# Patient Record
Sex: FEMALE | Race: WHITE | Employment: PART TIME | URBAN - METROPOLITAN AREA
[De-identification: names, ages, dates, MRNs, and addresses within clinical notes are randomized per-mention and may not be internally consistent; named-entity substitution may affect disease eponyms.]

---

## 2020-08-13 LAB
ANTIBODY SCREEN, EXTERNAL: NEGATIVE
CHLAMYDIA, EXTERNAL: NEGATIVE
HBSAG, EXTERNAL: NEGATIVE
HCT, EXTERNAL: 42.2
HGB, EXTERNAL: 14.2
HIV, EXTERNAL: NON REACTIVE
N. GONORRHEA, EXTERNAL: NEGATIVE
RUBELLA, EXTERNAL: NORMAL
T. PALLIDUM, EXTERNAL: NON REACTIVE
TYPE, ABO & RH, EXTERNAL: NORMAL

## 2020-09-10 LAB — GTT, 1 HR, GLUCOLA, EXTERNAL: 198

## 2020-09-15 LAB
GTT 120 MIN, EXTERNAL: 204
GTT 180 MIN, EXTERNAL: 118
GTT 60 MIN, EXTERNAL: 253
GTT, FASTING, EXTERNAL: 130

## 2021-02-19 LAB — GRBS, EXTERNAL: POSITIVE

## 2021-02-22 ENCOUNTER — HOSPITAL ENCOUNTER (OUTPATIENT)
Age: 36
Setting detail: OBSERVATION
Discharge: HOME | DRG: 540 | End: 2021-02-23
Attending: OBSTETRICS & GYNECOLOGY | Admitting: OBSTETRICS & GYNECOLOGY
Payer: MEDICAID

## 2021-02-22 PROBLEM — O16.3 HYPERTENSION AFFECTING PREGNANCY, THIRD TRIMESTER: Status: ACTIVE | Noted: 2021-02-22

## 2021-02-22 PROBLEM — F32.A DEPRESSION: Status: ACTIVE | Noted: 2021-02-22

## 2021-02-22 PROBLEM — Z34.90 PREGNANCY: Status: ACTIVE | Noted: 2021-02-22

## 2021-02-22 LAB
ALBUMIN SERPL-MCNC: 2.5 G/DL (ref 3.5–5)
ALBUMIN/GLOB SERPL: 0.7 {RATIO} (ref 1.1–2.2)
ALP SERPL-CCNC: 122 U/L (ref 45–117)
ALT SERPL-CCNC: 11 U/L (ref 12–78)
ANION GAP SERPL CALC-SCNC: 7 MMOL/L (ref 5–15)
AST SERPL-CCNC: 11 U/L (ref 15–37)
BASOPHILS # BLD: 0.1 K/UL (ref 0–0.1)
BASOPHILS NFR BLD: 0 % (ref 0–1)
BILIRUB SERPL-MCNC: 0.2 MG/DL (ref 0.2–1)
BUN SERPL-MCNC: 5 MG/DL (ref 6–20)
BUN/CREAT SERPL: 10 (ref 12–20)
CALCIUM SERPL-MCNC: 8.5 MG/DL (ref 8.5–10.1)
CHLORIDE SERPL-SCNC: 112 MMOL/L (ref 97–108)
CO2 SERPL-SCNC: 21 MMOL/L (ref 21–32)
COVID-19 RAPID TEST, COVR: NOT DETECTED
CREAT SERPL-MCNC: 0.51 MG/DL (ref 0.55–1.02)
CREAT UR-MCNC: 145 MG/DL
DIFFERENTIAL METHOD BLD: ABNORMAL
EOSINOPHIL # BLD: 0.1 K/UL (ref 0–0.4)
EOSINOPHIL NFR BLD: 1 % (ref 0–7)
ERYTHROCYTE [DISTWIDTH] IN BLOOD BY AUTOMATED COUNT: 14.2 % (ref 11.5–14.5)
GLOBULIN SER CALC-MCNC: 3.6 G/DL (ref 2–4)
GLUCOSE BLD STRIP.AUTO-MCNC: 128 MG/DL (ref 65–100)
GLUCOSE BLD STRIP.AUTO-MCNC: 150 MG/DL (ref 65–100)
GLUCOSE SERPL-MCNC: 94 MG/DL (ref 65–100)
HCT VFR BLD AUTO: 36.3 % (ref 35–47)
HGB BLD-MCNC: 12.1 G/DL (ref 11.5–16)
IMM GRANULOCYTES # BLD AUTO: 0.2 K/UL (ref 0–0.04)
IMM GRANULOCYTES NFR BLD AUTO: 1 % (ref 0–0.5)
LDH SERPL L TO P-CCNC: 105 U/L (ref 81–246)
LYMPHOCYTES # BLD: 2.3 K/UL (ref 0.8–3.5)
LYMPHOCYTES NFR BLD: 15 % (ref 12–49)
MCH RBC QN AUTO: 29.4 PG (ref 26–34)
MCHC RBC AUTO-ENTMCNC: 33.3 G/DL (ref 30–36.5)
MCV RBC AUTO: 88.1 FL (ref 80–99)
MONOCYTES # BLD: 0.8 K/UL (ref 0–1)
MONOCYTES NFR BLD: 5 % (ref 5–13)
NEUTS SEG # BLD: 12.5 K/UL (ref 1.8–8)
NEUTS SEG NFR BLD: 78 % (ref 32–75)
NRBC # BLD: 0 K/UL (ref 0–0.01)
NRBC BLD-RTO: 0 PER 100 WBC
PLATELET # BLD AUTO: 399 K/UL (ref 150–400)
PMV BLD AUTO: 9.4 FL (ref 8.9–12.9)
POTASSIUM SERPL-SCNC: 4.1 MMOL/L (ref 3.5–5.1)
PROT SERPL-MCNC: 6.1 G/DL (ref 6.4–8.2)
PROT UR-MCNC: 35 MG/DL (ref 0–11.9)
PROT/CREAT UR-RTO: 0.2
RBC # BLD AUTO: 4.12 M/UL (ref 3.8–5.2)
SARS-COV-2, COV2: NORMAL
SERVICE CMNT-IMP: ABNORMAL
SERVICE CMNT-IMP: ABNORMAL
SODIUM SERPL-SCNC: 140 MMOL/L (ref 136–145)
SOURCE, COVRS: NORMAL
URATE SERPL-MCNC: 4 MG/DL (ref 2.6–6)
WBC # BLD AUTO: 15.9 K/UL (ref 3.6–11)

## 2021-02-22 PROCEDURE — 82962 GLUCOSE BLOOD TEST: CPT

## 2021-02-22 PROCEDURE — 74011250636 HC RX REV CODE- 250/636: Performed by: OBSTETRICS & GYNECOLOGY

## 2021-02-22 PROCEDURE — 74011250637 HC RX REV CODE- 250/637: Performed by: OBSTETRICS & GYNECOLOGY

## 2021-02-22 PROCEDURE — 83615 LACTATE (LD) (LDH) ENZYME: CPT

## 2021-02-22 PROCEDURE — 59025 FETAL NON-STRESS TEST: CPT

## 2021-02-22 PROCEDURE — 36415 COLL VENOUS BLD VENIPUNCTURE: CPT

## 2021-02-22 PROCEDURE — 85025 COMPLETE CBC W/AUTO DIFF WBC: CPT

## 2021-02-22 PROCEDURE — 84550 ASSAY OF BLOOD/URIC ACID: CPT

## 2021-02-22 PROCEDURE — 80053 COMPREHEN METABOLIC PANEL: CPT

## 2021-02-22 PROCEDURE — 96374 THER/PROPH/DIAG INJ IV PUSH: CPT

## 2021-02-22 PROCEDURE — 99218 HC RM OBSERVATION: CPT

## 2021-02-22 PROCEDURE — 74011636637 HC RX REV CODE- 636/637: Performed by: OBSTETRICS & GYNECOLOGY

## 2021-02-22 PROCEDURE — 84156 ASSAY OF PROTEIN URINE: CPT

## 2021-02-22 PROCEDURE — 87635 SARS-COV-2 COVID-19 AMP PRB: CPT

## 2021-02-22 RX ORDER — ASPIRIN 81 MG/1
81 TABLET ORAL DAILY
COMMUNITY
End: 2021-03-01

## 2021-02-22 RX ORDER — CITALOPRAM 20 MG/1
10 TABLET, FILM COATED ORAL
Status: DISCONTINUED | OUTPATIENT
Start: 2021-02-22 | End: 2021-02-23 | Stop reason: HOSPADM

## 2021-02-22 RX ORDER — SODIUM CHLORIDE 0.9 % (FLUSH) 0.9 %
5-40 SYRINGE (ML) INJECTION EVERY 8 HOURS
Status: DISCONTINUED | OUTPATIENT
Start: 2021-02-22 | End: 2021-02-23 | Stop reason: HOSPADM

## 2021-02-22 RX ORDER — SWAB
1 SWAB, NON-MEDICATED MISCELLANEOUS DAILY
Status: DISCONTINUED | OUTPATIENT
Start: 2021-02-23 | End: 2021-02-23 | Stop reason: HOSPADM

## 2021-02-22 RX ORDER — DIPHENHYDRAMINE HCL 25 MG
25 CAPSULE ORAL
Status: DISCONTINUED | OUTPATIENT
Start: 2021-02-22 | End: 2021-02-23 | Stop reason: HOSPADM

## 2021-02-22 RX ORDER — ACETAMINOPHEN 325 MG/1
650 TABLET ORAL
Status: DISCONTINUED | OUTPATIENT
Start: 2021-02-22 | End: 2021-02-23 | Stop reason: HOSPADM

## 2021-02-22 RX ORDER — CALCIUM CARBONATE 200(500)MG
1 TABLET,CHEWABLE ORAL
COMMUNITY
End: 2021-03-01

## 2021-02-22 RX ORDER — ZOLPIDEM TARTRATE 5 MG/1
10 TABLET ORAL
Status: DISCONTINUED | OUTPATIENT
Start: 2021-02-22 | End: 2021-02-23 | Stop reason: HOSPADM

## 2021-02-22 RX ORDER — CITALOPRAM 10 MG/1
10 TABLET ORAL DAILY
COMMUNITY
End: 2021-03-12

## 2021-02-22 RX ORDER — SODIUM CHLORIDE 0.9 % (FLUSH) 0.9 %
5-40 SYRINGE (ML) INJECTION AS NEEDED
Status: DISCONTINUED | OUTPATIENT
Start: 2021-02-22 | End: 2021-02-23 | Stop reason: HOSPADM

## 2021-02-22 RX ORDER — ONDANSETRON 2 MG/ML
4 INJECTION INTRAMUSCULAR; INTRAVENOUS
Status: DISCONTINUED | OUTPATIENT
Start: 2021-02-22 | End: 2021-02-23 | Stop reason: HOSPADM

## 2021-02-22 RX ORDER — DOCUSATE SODIUM 100 MG/1
100 CAPSULE, LIQUID FILLED ORAL
Status: DISCONTINUED | OUTPATIENT
Start: 2021-02-22 | End: 2021-02-23 | Stop reason: HOSPADM

## 2021-02-22 RX ORDER — FAMOTIDINE 20 MG/1
20 TABLET, FILM COATED ORAL
COMMUNITY
End: 2021-03-01

## 2021-02-22 RX ADMIN — CITALOPRAM HYDROBROMIDE 10 MG: 20 TABLET ORAL at 21:10

## 2021-02-22 RX ADMIN — LABETALOL HYDROCHLORIDE 300 MG: 100 TABLET, FILM COATED ORAL at 18:29

## 2021-02-22 RX ADMIN — INSULIN HUMAN 30 UNITS: 100 INJECTION, SUSPENSION SUBCUTANEOUS at 21:10

## 2021-02-22 RX ADMIN — ONDANSETRON 4 MG: 2 INJECTION INTRAMUSCULAR; INTRAVENOUS at 17:39

## 2021-02-22 NOTE — PROGRESS NOTES
Ante Partum Progress Note    Nga Began  Unknown    Assessment: Unknown   Active Problems:    Pregnancy (2021)      Gestational diabetes mellitus (GDM) in childbirth, insulin controlled (2021)      Depression (2021)      Hypertension affecting pregnancy, third trimester (2021)    --w / intermittent severe range pressures. --no indication for induction at this time, w/ normal PIH labs, but recommend cont. In hspt.  Observation w/ q 4 hour bp checks       Start Labetalol 300mg bid now due to [address]:  Admit for continued observtion            Induce for persistent severe range BPs            If intermittent severe range BP will induce at 37wks    Patient states she has no new complaints    Orders/Charges: High and Non Stress Test        Vitals:  Visit Vitals  BP (!) 146/77   Pulse 75   Temp 98.6 °F (37 °C)   Resp 16   Ht 5' 7\" (1.702 m)   Wt 105.2 kg (232 lb)   SpO2 98%   BMI 36.34 kg/m²     Temp (24hrs), Av.7 °F (37.1 °C), Min:98.6 °F (37 °C), Max:98.7 °F (37.1 °C)    Patient Vitals for the past 24 hrs:   BP Temp Pulse Resp SpO2 Height Weight   21 1415 -- -- -- -- 98 % -- --   21 1410 -- -- -- -- 98 % -- --   21 1405 -- -- -- -- 97 % -- --   21 1400 (!) 146/77 98.6 °F (37 °C) 75 16 97 % -- --   21 1355 -- -- -- -- 97 % -- --   21 1350 -- -- -- -- 97 % -- --   21 1345 (!) 140/83 -- 78 -- 98 % -- --   21 1340 -- -- -- -- 98 % -- --   21 1335 -- -- -- -- 98 % -- --   21 1330 (!) 146/84 -- 71 -- 98 % -- --   21 1325 -- -- -- -- 98 % -- --   21 1320 -- -- -- -- 98 % -- --   21 1315 (!) 141/80 -- 82 -- 98 % -- --   21 1301 (!) 163/82 -- 80 -- -- -- --   21 1300 -- -- -- -- -- 5' 7\" (1.702 m) 105.2 kg (232 lb)   21 1259 -- -- -- -- 98 % -- --   21 1254 -- -- -- -- 98 % -- --   21 1250 -- 98.7 °F (37.1 °C) -- 18 100 % -- --   21 1249 -- -- -- -- 98 % -- -- 02/22/21 1246 (!) 163/100 -- 72 -- -- -- --   02/22/21 1244 -- -- -- -- 98 % -- --   02/22/21 1239 -- -- -- -- 98 % -- --   02/22/21 1231 (!) 160/95 -- 85 -- -- -- --       Last 24hr Input/Output:  No intake or output data in the 24 hours ending 02/22/21 1511     Non stress test:  Reactive    Patient Vitals for the past 4 hrs: Mode Fetal Heart Rate Fetal Activity Variability Decelerations Accelerations RN Reviewed Strip? Non Stress Test   02/22/21 1315 External 140 Present 6-25 BPM None Yes Yes Reactive   02/22/21 1250 External 140 Present 6-25 BPM None Yes Yes --       Uterine Activity: None     Exam:  Patient without distress. Abdomen, fundus soft non-tender     Extremities, no redness or tenderness     No current facility-administered medications for this encounter. Additional Exam: Deferred    Labs:     Recent Results (from the past 24 hour(s))   CBC WITH AUTOMATED DIFF    Collection Time: 02/22/21 12:41 PM   Result Value Ref Range    WBC 15.9 (H) 3.6 - 11.0 K/uL    RBC 4.12 3.80 - 5.20 M/uL    HGB 12.1 11.5 - 16.0 g/dL    HCT 36.3 35.0 - 47.0 %    MCV 88.1 80.0 - 99.0 FL    MCH 29.4 26.0 - 34.0 PG    MCHC 33.3 30.0 - 36.5 g/dL    RDW 14.2 11.5 - 14.5 %    PLATELET 032 082 - 068 K/uL    MPV 9.4 8.9 - 12.9 FL    NRBC 0.0 0  WBC    ABSOLUTE NRBC 0.00 0.00 - 0.01 K/uL    NEUTROPHILS 78 (H) 32 - 75 %    LYMPHOCYTES 15 12 - 49 %    MONOCYTES 5 5 - 13 %    EOSINOPHILS 1 0 - 7 %    BASOPHILS 0 0 - 1 %    IMMATURE GRANULOCYTES 1 (H) 0.0 - 0.5 %    ABS. NEUTROPHILS 12.5 (H) 1.8 - 8.0 K/UL    ABS. LYMPHOCYTES 2.3 0.8 - 3.5 K/UL    ABS. MONOCYTES 0.8 0.0 - 1.0 K/UL    ABS. EOSINOPHILS 0.1 0.0 - 0.4 K/UL    ABS. BASOPHILS 0.1 0.0 - 0.1 K/UL    ABS. IMM.  GRANS. 0.2 (H) 0.00 - 0.04 K/UL    DF AUTOMATED     METABOLIC PANEL, COMPREHENSIVE    Collection Time: 02/22/21 12:41 PM   Result Value Ref Range    Sodium 140 136 - 145 mmol/L    Potassium 4.1 3.5 - 5.1 mmol/L    Chloride 112 (H) 97 - 108 mmol/L    CO2 21 21 - 32 mmol/L    Anion gap 7 5 - 15 mmol/L    Glucose 94 65 - 100 mg/dL    BUN 5 (L) 6 - 20 MG/DL    Creatinine 0.51 (L) 0.55 - 1.02 MG/DL    BUN/Creatinine ratio 10 (L) 12 - 20      GFR est AA >60 >60 ml/min/1.73m2    GFR est non-AA >60 >60 ml/min/1.73m2    Calcium 8.5 8.5 - 10.1 MG/DL    Bilirubin, total 0.2 0.2 - 1.0 MG/DL    ALT (SGPT) 11 (L) 12 - 78 U/L    AST (SGOT) 11 (L) 15 - 37 U/L    Alk. phosphatase 122 (H) 45 - 117 U/L    Protein, total 6.1 (L) 6.4 - 8.2 g/dL    Albumin 2.5 (L) 3.5 - 5.0 g/dL    Globulin 3.6 2.0 - 4.0 g/dL    A-G Ratio 0.7 (L) 1.1 - 2.2     URIC ACID    Collection Time: 02/22/21 12:41 PM   Result Value Ref Range    Uric acid 4.0 2.6 - 6.0 MG/DL   LD    Collection Time: 02/22/21 12:41 PM   Result Value Ref Range     81 - 246 U/L   PROTEIN/CREATININE RATIO, URINE    Collection Time: 02/22/21  1:07 PM   Result Value Ref Range    Protein, urine random 35 (H) 0.0 - 11.9 mg/dL    Creatinine, urine 145.00 mg/dL    Protein/Creat. urine Ratio 0.2         No results for input(s): GLUCPOC in the last 72 hours.     No lab exists for component: [place]

## 2021-02-22 NOTE — H&P
History & Physical    Name: Antonina Laurent MRN: [id number]  SSN: [id number]    YOB: 1985  Age: 37 y.o. Sex: female      Subjective:     Reason for Admission:  Pregnancy and Chronic Hypertension, r/o SIPIH  History of Present Illness: Ms. Mary Mantilla is a 37 y.o.  female with an estimated gestational age of 36wk4d presents to office today for routine PNC w/ BPs noted to be 1160/102 and 154/100. Pregnancy has been complicated by  [facility], S9UB . Growth : 2347gms, vtx/ant. Plac. 74%ile  Wkly BPP: , vtx  BS in normal range on current qhs insulin regimen    Patient denies chest pain, contractions, nausea and vomiting, right upper quadrant pain  , shortness of breath, vaginal bleeding  and visual disturbances. OB History        1    Para        Term                AB        Living           SAB        TAB        Ectopic        Molar        Multiple        Live Births                  No past medical history on file. No past surgical history on file. Social History     Occupational History    Not on file   Tobacco Use    Smoking status: Not on file   Substance and Sexual Activity    Alcohol use: Not on file    Drug use: Not on file    Sexual activity: Not on file      No family history on file. Allergies   Allergen Reactions    Latex Rash    Amoxicillin Rash and Swelling    Pcn [Penicillins] Rash and Swelling    Sulfa (Sulfonamide Antibiotics) Rash and Swelling     Prior to Admission medications    Not on File        Review of Systems:  A comprehensive review of systems was negative except for that written in the History of Present Illness. Objective:     Vitals: There were no vitals filed for this visit. Pob/gyn hx: tab x4  PMH:   Arthritis              Depression              GERD               Gastroparesis  PSH:   Cholecystectomy             Gastroparesis surgery   No data recorded.     No data recorded     Physical Exam:  Patient without distress. Heart: Regular rate and rhythm  Lung: no wheezes, no rales, no rhonchi and normal respiratory effort  Abdomen: soft, nontender  Fundus: soft and non tender  Perineum: blood absent, amniotic fluid absent  Cervical Exam: 50/1/-3 per Dr. Luke Mckinley (office)  Lower Extremities:  - Edema 2+   - Patellar Reflexes: 2+ bilaterally   - Clonus: absent     Membranes:  Intact  Uterine Activity:  None  Fetal Heart Rate:  pending       Lab/Data Review:  No results found for this or any previous visit (from the past 24 hour(s)). Assessment and Plan:    Active Problems:   IUP 36wk 4dasy,     CHTN, r/o SIPIH     A2DM    Depression      Plan: NST, serial BPs          PIH labs          NPO for now, will need diabetic diet    Signed By:  Savanah Fraser MD     February 22, 2021

## 2021-02-22 NOTE — PROGRESS NOTES
1350: Bedside and Verbal shift change report given to Jewels oSlorio 67. (oncoming nurse) by Edelmira Moore RN (offgoing nurse). Report included the following information SBAR, Kardex, Intake/Output, MAR, Recent Results and Med Rec Status. 1412: Dr. Shawn Ugalde called, no answer at this time. 1515: Dr. Shawn Ugalde updated on severe range BP x1, will check. MD stated pt will stay overnight for observation. Orders received for BP q4 hours while awake and NST daily. 1744: Pt placed on EFM, pt reports irregular mild contx. 1750: Pt sitting up eating dinner, EFM tracing maternal HR. Pt instructed to call when she finishes eating her dinner. 1827: Pt finished eating dinner, EFM adjusted. 1900: Bedside and Verbal shift change report given to Arlene 93 (oncoming nurse) by Yves Vieira RN (offgoing nurse). Report included the following information SBAR, Kardex, Intake/Output, MAR, Recent Results and Med Rec Status.

## 2021-02-22 NOTE — PROGRESS NOTES
1225 pt arrived to L&D from MD office for pre-e workup  1315 NST completed, pt denies feeling the contractions, states she feels a cramp sometimes but nothing regular  1345 Dr Kathy Engel reviewed fetal strip, labs & vitals, VO received for glucometer checks and ADA diet  1350 SBAR to Tanner Baltazar RN and released care at this time

## 2021-02-23 VITALS
DIASTOLIC BLOOD PRESSURE: 88 MMHG | HEART RATE: 82 BPM | RESPIRATION RATE: 16 BRPM | WEIGHT: 232 LBS | OXYGEN SATURATION: 99 % | HEIGHT: 67 IN | BODY MASS INDEX: 36.41 KG/M2 | SYSTOLIC BLOOD PRESSURE: 144 MMHG | TEMPERATURE: 98.2 F

## 2021-02-23 LAB
GLUCOSE BLD STRIP.AUTO-MCNC: 133 MG/DL (ref 65–100)
SERVICE CMNT-IMP: ABNORMAL

## 2021-02-23 PROCEDURE — 99218 HC RM OBSERVATION: CPT

## 2021-02-23 PROCEDURE — 82962 GLUCOSE BLOOD TEST: CPT

## 2021-02-23 PROCEDURE — 74011250637 HC RX REV CODE- 250/637: Performed by: OBSTETRICS & GYNECOLOGY

## 2021-02-23 PROCEDURE — 59025 FETAL NON-STRESS TEST: CPT

## 2021-02-23 PROCEDURE — 74011250636 HC RX REV CODE- 250/636: Performed by: OBSTETRICS & GYNECOLOGY

## 2021-02-23 PROCEDURE — 96376 TX/PRO/DX INJ SAME DRUG ADON: CPT

## 2021-02-23 RX ORDER — LABETALOL 300 MG/1
300 TABLET, FILM COATED ORAL 3 TIMES DAILY
Qty: 90 TAB | Refills: 1 | Status: SHIPPED | OUTPATIENT
Start: 2021-02-23 | End: 2021-03-01

## 2021-02-23 RX ADMIN — Medication 10 ML: at 08:05

## 2021-02-23 RX ADMIN — ONDANSETRON 4 MG: 2 INJECTION INTRAMUSCULAR; INTRAVENOUS at 01:17

## 2021-02-23 RX ADMIN — ONDANSETRON 4 MG: 2 INJECTION INTRAMUSCULAR; INTRAVENOUS at 12:03

## 2021-02-23 RX ADMIN — LABETALOL HYDROCHLORIDE 300 MG: 100 TABLET, FILM COATED ORAL at 09:01

## 2021-02-23 RX ADMIN — LABETALOL HYDROCHLORIDE 300 MG: 100 TABLET, FILM COATED ORAL at 15:08

## 2021-02-23 RX ADMIN — ONDANSETRON 4 MG: 2 INJECTION INTRAMUSCULAR; INTRAVENOUS at 08:04

## 2021-02-23 RX ADMIN — Medication 10 ML: at 12:06

## 2021-02-23 RX ADMIN — Medication 1 TABLET: at 09:01

## 2021-02-23 NOTE — DISCHARGE SUMMARY
Obstetrical Discharge Summary     Name: Tom Armendariz MRN: [id number]  SSN: [id number]    YOB: 1985  Age: 37 y.o. Sex: female      Admit Date: 2/22/2021    Discharge Date: 2/23/2021     Admitting Physician: Chicho Lnae MD     Attending Physician:  Jordan Santana MD     Admission Diagnoses: Pregnancy [Z34.90]    Discharge Diagnoses: This patient has no babies on file. Additional Diagnoses:   Hospital Problems  Date Reviewed: 2/22/2021          Codes Class Noted POA    Pregnancy ICD-10-CM: Z34.90  ICD-9-CM: V22.2  2/22/2021 Unknown        Gestational diabetes mellitus (GDM) in childbirth, insulin controlled ICD-10-CM: O24.424  ICD-9-CM: 648.81, V58.67  2/22/2021 Unknown        Depression ICD-10-CM: F32.9  ICD-9-CM: 937  2/22/2021 Unknown        Hypertension affecting pregnancy, third trimester ICD-10-CM: O16.3  ICD-9-CM: 642.93  2/22/2021 Unknown           No results found for: YOSVANY [facility] THIEF RVR FALL Course:      1. CHTN  -PreE labs wnl  -BPs still elevated on labetalol 300 mg bid, increased to tid today  -Plan to observe until this afternoon, if BPs improve, will consider d/c this evening  -Given need for antihypertensive medication and adjustments, recommend IOL at 37 weeks (patient scheduled for IOL on 2/26 with ripening night prior)     2. GDMA2  -NPH 30u qhs  -Fasting BG was not checked this am, will continue to monitor     3. IUP at 36 5/7 weeks  -Last GS at 33 wks: EFW 74th%ile  -Continue daily NST and PNV  -GBS pending      4. Depression  -Mood stable on celexa     Patient Instructions:   Current Discharge Medication List      START taking these medications    Details   labetaloL (NORMODYNE) 300 mg tablet Take 1 Tab by mouth three (3) times daily. Qty: 90 Tab, Refills: 1         CONTINUE these medications which have NOT CHANGED    Details   PNV Comb #2-Iron-Omega 3-FA [id number] mg cmpk Take  by mouth.       insulin NPH (HumuLIN N NPH U-100 Insulin) 100 unit/mL injection 30 Units by SubCUTAneous route nightly. Indications: diabetes during pregnancy      citalopram (CeleXA) 10 mg tablet Take 10 mg by mouth daily. Indications: anxiousness associated with depression, posttraumatic stress syndrome      famotidine (Pepcid AC) 20 mg tablet Take 20 mg by mouth nightly. Indications: heartburn      calcium carbonate (TUMS) 200 mg calcium (500 mg) chew Take 1 Tab by mouth once as needed. Indications: heartburn      aspirin delayed-release 81 mg tablet Take 81 mg by mouth daily. Disposition at Discharge: Home or self care    Condition at Discharge: Stable    Reference my discharge instructions.     Follow-up Appointments   Procedures    FOLLOW UP VISIT Appointment in: 3 - 5 Days     Standing Status:   Standing     Number of Occurrences:   1     Order Specific Question:   Appointment in     Answer:   3 - 5 Days        Signed By:  Brian March MD     February 23, 2021

## 2021-02-23 NOTE — PROGRESS NOTES
Ante Partum Progress Note    Erna Geiger    A/P: 36 y/o  with IUP at 39 5/7 wks with GDMA2 and CHTN admitted with worsening BPs.      1. CHTN  -PreE labs wnl  -BPs still elevated on labetalol 300 mg bid, increased to tid today  -Plan to observe until this afternoon, if BPs improve, will consider d/c this evening  -Given need for antihypertensive medication and adjustments, recommend IOL at 37 weeks (patient scheduled for IOL on  with ripening night prior)    2. GDMA2  -NPH 30u qhs  -Fasting BG was not checked this am, will continue to monitor    3. IUP at 36 5/7 weeks  -Last GS at 33 wks: EFW 74th%ile  -Continue daily NST and PNV  -GBS pending     4. Depression  -Mood stable on celexa     Patient states she has no new complaints. She denies HA, visual changes, RUQ pain, vb, lof, ctx. +FM.      Orders/Charges: High and Non Stress Test        Vitals:  Visit Vitals  BP (!) 151/87   Pulse 82   Temp 98.4 °F (36.9 °C)   Resp 17   Ht 5' 7\" (1.702 m)   Wt 105.2 kg (232 lb)   SpO2 99%   BMI 36.34 kg/m²     Temp (24hrs), Av.5 °F (36.9 °C), Min:98.3 °F (36.8 °C), Max:98.7 °F (37.1 °C)    Patient Vitals for the past 24 hrs:   BP Temp Pulse Resp SpO2 Height Weight   21 0811 (!) 151/87 98.4 °F (36.9 °C) 82 17 -- -- --   21 0632 (!) 144/81 -- 84 -- -- -- --   21 0200 (!) 146/72 98.5 °F (36.9 °C) 74 16 -- -- --   21 2315 (!) 150/90 -- 69 -- -- -- --   21 2114 (!) 149/81 -- 80 -- -- -- --   21 1934 103/63 98.3 °F (36.8 °C) 84 16 -- -- --   21 134/79 -- 84 -- -- -- --   21 -- -- -- -- 99 % -- --   21 -- -- -- -- 99 % -- --   21 -- -- -- -- 98 % -- --   21 (!) 161/89 -- 76 -- -- -- --   21 -- -- -- -- 99 % -- --   21 -- -- -- -- 99 % -- --   21 -- -- -- -- 99 % -- --   21 -- -- -- -- 99 % -- --   21 -- -- -- -- 99 % -- --   21 1522 (!) 154/91 -- 84 -- -- -- -- 02/22/21 1511 (!) 172/79 -- 72 -- -- -- --   02/22/21 1415 -- -- -- -- 98 % -- --   02/22/21 1410 -- -- -- -- 98 % -- --   02/22/21 1405 -- -- -- -- 97 % -- --   02/22/21 1400 (!) 146/77 98.6 °F (37 °C) 75 16 97 % -- --   02/22/21 1355 -- -- -- -- 97 % -- --   02/22/21 1350 -- -- -- -- 97 % -- --   02/22/21 1345 (!) 140/83 -- 78 -- 98 % -- --   02/22/21 1340 -- -- -- -- 98 % -- --   02/22/21 1335 -- -- -- -- 98 % -- --   02/22/21 1330 (!) 146/84 -- 71 -- 98 % -- --   02/22/21 1325 -- -- -- -- 98 % -- --   02/22/21 1320 -- -- -- -- 98 % -- --   02/22/21 1315 (!) 141/80 -- 82 -- 98 % -- --   02/22/21 1301 (!) 163/82 -- 80 -- -- -- --   02/22/21 1300 -- -- -- -- -- 5' 7\" (1.702 m) 105.2 kg (232 lb)   02/22/21 1259 -- -- -- -- 98 % -- --   02/22/21 1254 -- -- -- -- 98 % -- --   02/22/21 1250 -- 98.7 °F (37.1 °C) -- 18 100 % -- --   02/22/21 1249 -- -- -- -- 98 % -- --   02/22/21 1246 (!) 163/100 -- 72 -- -- -- --   02/22/21 1244 -- -- -- -- 98 % -- --   02/22/21 1239 -- -- -- -- 98 % -- --   02/22/21 1231 (!) 160/95 -- 85 -- -- -- --       Last 24hr Input/Output:  No intake or output data in the 24 hours ending 02/23/21 0847     Non stress test:  Reactive    No data found. Uterine Activity: None     Exam:  Patient without distress.      Abdomen, fundus soft non-tender     Extremities, no redness or tenderness     Current Facility-Administered Medications   Medication Dose Route Frequency    sodium chloride (NS) flush 5-40 mL  5-40 mL IntraVENous Q8H    prenatal vit-iron fumarate-fa (PRENATAL PLUS with IRON) tablet 1 Tab  1 Tab Oral DAILY    citalopram (CELEXA) tablet 10 mg  10 mg Oral QHS    insulin NPH (NOVOLIN N, HUMULIN N) injection 30 Units  30 Units SubCUTAneous QHS    labetaloL (NORMODYNE) tablet 300 mg  300 mg Oral Q12H                   Additional Exam: Deferred    Labs:     Recent Results (from the past 24 hour(s))   CBC WITH AUTOMATED DIFF    Collection Time: 02/22/21 12:41 PM   Result Value Ref Range WBC 15.9 (H) 3.6 - 11.0 K/uL    RBC 4.12 3.80 - 5.20 M/uL    HGB 12.1 11.5 - 16.0 g/dL    HCT 36.3 35.0 - 47.0 %    MCV 88.1 80.0 - 99.0 FL    MCH 29.4 26.0 - 34.0 PG    MCHC 33.3 30.0 - 36.5 g/dL    RDW 14.2 11.5 - 14.5 %    PLATELET 327 076 - 890 K/uL    MPV 9.4 8.9 - 12.9 FL    NRBC 0.0 0  WBC    ABSOLUTE NRBC 0.00 0.00 - 0.01 K/uL    NEUTROPHILS 78 (H) 32 - 75 %    LYMPHOCYTES 15 12 - 49 %    MONOCYTES 5 5 - 13 %    EOSINOPHILS 1 0 - 7 %    BASOPHILS 0 0 - 1 %    IMMATURE GRANULOCYTES 1 (H) 0.0 - 0.5 %    ABS. NEUTROPHILS 12.5 (H) 1.8 - 8.0 K/UL    ABS. LYMPHOCYTES 2.3 0.8 - 3.5 K/UL    ABS. MONOCYTES 0.8 0.0 - 1.0 K/UL    ABS. EOSINOPHILS 0.1 0.0 - 0.4 K/UL    ABS. BASOPHILS 0.1 0.0 - 0.1 K/UL    ABS. IMM. GRANS. 0.2 (H) 0.00 - 0.04 K/UL    DF AUTOMATED     METABOLIC PANEL, COMPREHENSIVE    Collection Time: 02/22/21 12:41 PM   Result Value Ref Range    Sodium 140 136 - 145 mmol/L    Potassium 4.1 3.5 - 5.1 mmol/L    Chloride 112 (H) 97 - 108 mmol/L    CO2 21 21 - 32 mmol/L    Anion gap 7 5 - 15 mmol/L    Glucose 94 65 - 100 mg/dL    BUN 5 (L) 6 - 20 MG/DL    Creatinine 0.51 (L) 0.55 - 1.02 MG/DL    BUN/Creatinine ratio 10 (L) 12 - 20      GFR est AA >60 >60 ml/min/1.73m2    GFR est non-AA >60 >60 ml/min/1.73m2    Calcium 8.5 8.5 - 10.1 MG/DL    Bilirubin, total 0.2 0.2 - 1.0 MG/DL    ALT (SGPT) 11 (L) 12 - 78 U/L    AST (SGOT) 11 (L) 15 - 37 U/L    Alk.  phosphatase 122 (H) 45 - 117 U/L    Protein, total 6.1 (L) 6.4 - 8.2 g/dL    Albumin 2.5 (L) 3.5 - 5.0 g/dL    Globulin 3.6 2.0 - 4.0 g/dL    A-G Ratio 0.7 (L) 1.1 - 2.2     URIC ACID    Collection Time: 02/22/21 12:41 PM   Result Value Ref Range    Uric acid 4.0 2.6 - 6.0 MG/DL   LD    Collection Time: 02/22/21 12:41 PM   Result Value Ref Range     81 - 246 U/L   PROTEIN/CREATININE RATIO, URINE    Collection Time: 02/22/21  1:07 PM   Result Value Ref Range    Protein, urine random 35 (H) 0.0 - 11.9 mg/dL    Creatinine, urine 145.00 mg/dL Protein/Creat.  urine Ratio 0.2     GLUCOSE, POC    Collection Time: 02/22/21  4:21 PM   Result Value Ref Range    Glucose (POC) 150 (H) 65 - 100 mg/dL    Performed by Chico Gonzalez    SARS-COV-2    Collection Time: 02/22/21  5:25 PM   Result Value Ref Range    SARS-CoV-2 Please find results under separate order     COVID-19 RAPID TEST    Collection Time: 02/22/21  5:25 PM   Result Value Ref Range    Specimen source Nasopharyngeal      COVID-19 rapid test Not detected NOTD     GLUCOSE, POC    Collection Time: 02/22/21  7:37 PM   Result Value Ref Range    Glucose (POC) 128 (H) 65 - 100 mg/dL    Performed by Unruly Nick        Recent Labs     02/22/21  1937 02/22/21  1621   GLUCPOC 128* 150*

## 2021-02-23 NOTE — PROGRESS NOTES
1900- Bedside report received. Pt resting in bed without complaints. VSS. .    2145- Up to shower.  at bedside.

## 2021-02-23 NOTE — PROGRESS NOTES
Problem: Hypertensive Disorders of Pregnancy Care Plan (Gestational HTN, Preeclampsia, HELLP syndrome, Eclampsia, Chronic HTN, Superimposed Preeclamsia)  Goal: *Blood pressure within specified parameters  Outcome: Progressing Towards Goal  Goal: *Fluid volume balance  Outcome: Progressing Towards Goal  Goal: *Labs within defined limits  Outcome: Progressing Towards Goal  Goal: *Reassuring fetal surveillance  Outcome: Progressing Towards Goal  Goal: *Remains free of seizures  Outcome: Progressing Towards Goal     Problem: Patient Education: Go to Patient Education Activity  Goal: Patient/Family Education  Outcome: Progressing Towards Goal

## 2021-02-23 NOTE — PROGRESS NOTES
Blood pressure mild range, nausea improving. Pt to be d/c'd home with plan for IOL this week (scheduled to come in for cervical ripening on Thursday 2/25).

## 2021-02-23 NOTE — DISCHARGE INSTRUCTIONS
Patient Education        Preeclampsia: Care Instructions  Overview   Patient Education   Patient Education   You have a follow up appt tomorrow, you may cancel if you are feeling well enough, come back to L&D at [address] [place] [address] on 2021 @ 1600 for cervical ripening. Make sure to take labetalol three times a day, first dose at bedtime tonight, next dose tomorrow morning around [id number]     Weeks 34 to 36 of Your Pregnancy: Care Instructions  Your Care Instructions     By now, your baby and your belly have grown quite large. It is almost time to give birth. Your baby's lungs are almost ready to breathe air. The bones in your baby's head are now firm enough to protect it, but soft enough to move down through the birth canal.  You may feel excited, happy, anxious, or scared. You may wonder how you will know if you are in labor or what to expect during labor. Try to be flexible in your expectations of the birth. Because each birth is different, there is no way to know exactly what childbirth will be like for you. This care sheet will help you know what to expect and how to prepare. This may make your childbirth easier. If you haven't already had the Tdap shot during this pregnancy, talk to your doctor about getting it. It will help protect your  against pertussis infection. In the 36th week, most women have a test for group B streptococcus (GBS). GBS is a common bacteria that can live in the vagina and rectum. It can make your baby sick after birth. If you test positive, you will get antibiotics during labor. The medicine will keep your baby from getting the bacteria. Follow-up care is a key part of your treatment and safety. Be sure to make and go to all appointments, and call your doctor if you are having problems. It's also a good idea to know your test results and keep a list of the medicines you take. How can you care for yourself at home?   Learn about pain relief choices  · Pain is different for every woman. Talk with your doctor about your feelings about pain. · You can choose from several types of pain relief. These include medicine or breathing techniques, as well as comfort measures. You can use more than one option. · If you choose to have pain medicine during labor, talk to your doctor about your options. Some medicines lower anxiety and help with some of the pain. Others make your lower body numb so that you won't feel pain. · Be sure to tell your doctor about your pain medicine choice before you start labor or very early in your labor. You may be able to change your mind as labor progresses. · Rarely, a woman is put to sleep by medicine given through a mask or an IV. Labor and delivery  · The first stage of labor has three parts: early, active, and transition. ? Most women have early labor at home. You can stay busy or rest, eat light snacks, drink clear fluids, and start counting contractions. ? When talking during a contraction gets hard, you may be moving to active labor. During active labor, you should head for the hospital if you are not there already. ? You are in active labor when contractions come every 3 to 4 minutes and last about 60 seconds. Your cervix is opening more rapidly. ? If your water breaks, contractions will come faster and stronger. ? During transition, your cervix is stretching, and contractions are coming more rapidly. ? You may want to push, but your cervix might not be ready. Your doctor will tell you when to push. · The second stage starts when your cervix is completely opened and you are ready to push. ? Contractions are very strong to push the baby down the birth canal.  ? You will feel the urge to push. You may feel like you need to have a bowel movement. ? You may be coached to push with contractions. These contractions will be very strong, but you will not have them as often. You can get a little rest between contractions.   ? You may be emotional and irritable. You may not be aware of what is going on around you.  ? One last push, and your baby is born. · The third stage is when a few more contractions push out the placenta. This may take 30 minutes or less. · The fourth stage is the welcome recovery. You may feel overwhelmed with emotions and exhausted but alert. This is a good time to start breastfeeding. Where can you learn more? Go to http://www.gray.com/  Enter B912 in the search box to learn more about \"Weeks 34 to 36 of Your Pregnancy: Care Instructions. \"  Current as of: February 11, 2020               Content Version: 12.6  © 3356-5378 [employer]. Care instructions adapted under license by [employer] (which disclaims liability or warranty for this information). If you have questions about a medical condition or this instruction, always ask your healthcare professional. [place] [address] any warranty or liability for your use of this information. Counting Your Baby's Kicks: Care Instructions  Your Care Instructions     Counting your baby's kicks is one way your doctor can tell that your baby is healthy. Most women--especially in a first pregnancy--feel their baby move for the first time between 16 and 22 weeks. The movement may feel like flutters rather than kicks. Your baby may move more at certain times of the day. When you are active, you may notice less kicking than when you are resting. At your prenatal visits, your doctor will ask whether the baby is active. In your last trimester, your doctor may ask you to count the number of times you feel your baby move. Follow-up care is a key part of your treatment and safety. Be sure to make and go to all appointments, and call your doctor if you are having problems. It's also a good idea to know your test results and keep a list of the medicines you take. How do you count fetal kicks?   · A common method of checking your baby's movement is to count the number of kicks or moves you feel in 1 hour. Ten movements (such as kicks, flutters, or rolls) in 1 hour are normal. Some doctors suggest that you count in the morning until you get to 10 movements. Then you can quit for that day and start again the next day. · Pick your baby's most active time of day to count. This may be any time from morning to evening. · If you do not feel 10 movements in an hour, your baby may be sleeping. Wait for the next hour and count again. When should you call for help? Call your doctor now or seek immediate medical care if:    · You noticed that your baby has stopped moving or is moving much less than normal.   Watch closely for changes in your health, and be sure to contact your doctor if you have any problems. Where can you learn more? Go to http://www.gray.com/  Enter [id number] in the search box to learn more about \"Counting Your Baby's Kicks: Care Instructions. \"  Current as of: February 11, 2020               Content Version: 12.6  © 0050-5922 [employer]. Care instructions adapted under license by [employer] (which disclaims liability or warranty for this information). If you have questions about a medical condition or this instruction, always ask your healthcare professional. [address] any warranty or liability for your use of this information. Preeclampsia occurs when a woman's blood pressure rises during pregnancy. Often with preeclampsia, you also have swelling in your legs, hands, and face. A test may show too much protein in your urine. Preeclampsia is also called toxemia. If preeclampsia is severe and not treated, it can lead to seizures (eclampsia) and damage to your liver or kidneys. Preeclampsia can prevent your baby from getting enough food and oxygen. This can cause a low birth weight or other problems.  Your doctor will watch you closely to prevent these problems. He or she also may recommend that you reduce your activity. If your preeclampsia is a danger to your health or the health of your baby, your doctor may need to deliver your baby early. While preeclampsia is a concern, most women with preeclampsia have healthy babies. After a woman gives birth, preeclampsia usually goes away on its own. But symptoms may last a few weeks or more and can get worse after delivery. Rarely, symptoms of preeclampsia don't show up until days or even weeks after childbirth. Follow-up care is a key part of your treatment and safety. Be sure to make and go to all appointments, and call your doctor if you are having problems. It's also a good idea to know your test results and keep a list of the medicines you take. How can you care for yourself at home? · Take and record your blood pressure at home if your doctor tells you to. ? Learn the importance of the two measures of blood pressure (such as 120 over 80, or 120/80). The first number is the systolic pressure, which is the force of blood on the artery walls as the heart pumps. The second number is the diastolic pressure, which is the force of blood on the artery walls between heartbeats, when the heart is at rest. You have a choice of monitors to use. ? Manual monitor: You pump up the cuff and use a stethoscope to listen for your pulse. ? Electronic monitor: The cuff inflates, and a gauge shows your pulse rate. ? To take your blood pressure:  ? Ask your doctor to check your blood pressure monitor to be sure that it is accurate and that the cuff fits you. Also ask your doctor to watch you to make sure that you are using it right. ? You should not eat, use tobacco products, or use medicine known to raise blood pressure (such as some nasal decongestant sprays) before you take your blood pressure. ? Avoid taking your blood pressure if you have just exercised. Also avoid taking it if you are nervous or upset.  Rest at least 15 minutes before you take your blood pressure. · If your doctor advises, check the protein levels in your urine. Your doctor or nurse will show you how to do this. · Take your medicines exactly as prescribed. Call your doctor if you think you are having a problem with your medicine. · Do not smoke. Quitting smoking will help improve your baby's growth and health. If you need help quitting, talk to your doctor about stop-smoking programs and medicines. These can increase your chances of quitting for good. · Eat a balanced and healthy diet that has lots of fruits and vegetables. · If your doctor advised bed rest, be sure to stay off your feet and rest as much as possible. ? Keep a phone, notepad, and pen near the bed where you can easily reach them. ? Gently stretch your legs every hour to maintain good blood flow. ? Have another family member pack snacks and lunch food in a cooler close to your bed. ? Use this time for activities that you usually cannot find time for, such as reading, craft projects, or letter writing. · You can keep track of your baby's health by noting the length of time it takes to count 10 movements (such as kicks, flutters, or rolls). Feeling 10 movements in less than 1 hour is considered normal. Track your baby's movements once each day. Bring this record with you to each prenatal visit. When should you call for help? Call 911 anytime you think you may need emergency care. For example, call if:    · You passed out (lost consciousness).     · You have a seizure. Call your doctor now or seek immediate medical care if:    · You have symptoms of preeclampsia, such as:  ? Sudden swelling of your face, hands, or feet. ? New vision problems (such as dimness, blurring, or seeing spots).   ? A severe headache.     · Your blood pressure is higher than it should be, or it rises suddenly.     · You have new nausea or vomiting.     · You think that you are in labor.     · You have pain in your belly or pelvis. Watch closely for changes in your health, and be sure to contact your doctor if:    · You gain weight rapidly. Where can you learn more? Go to http://www.gray.com/  Enter Z954 in the search box to learn more about \"Preeclampsia: Care Instructions. \"  Current as of: February 11, 2020               Content Version: 12.6  © 1925-2915 [employer]. Care instructions adapted under license by [employer] (which disclaims liability or warranty for this information). If you have questions about a medical condition or this instruction, always ask your healthcare professional. [place] [address] any warranty or liability for your use of this information.

## 2021-02-25 ENCOUNTER — HOSPITAL ENCOUNTER (INPATIENT)
Age: 36
LOS: 4 days | Discharge: HOME | DRG: 540 | End: 2021-03-01
Attending: OBSTETRICS & GYNECOLOGY | Admitting: OBSTETRICS & GYNECOLOGY
Payer: MEDICAID

## 2021-02-25 PROBLEM — Z34.90 TERM PREGNANCY: Status: ACTIVE | Noted: 2021-02-25

## 2021-02-25 LAB
ALBUMIN SERPL-MCNC: 2.5 G/DL (ref 3.5–5)
ALBUMIN/GLOB SERPL: 0.8 {RATIO} (ref 1.1–2.2)
ALP SERPL-CCNC: 114 U/L (ref 45–117)
ALT SERPL-CCNC: 10 U/L (ref 12–78)
ANION GAP SERPL CALC-SCNC: 9 MMOL/L (ref 5–15)
AST SERPL-CCNC: 5 U/L (ref 15–37)
BASOPHILS # BLD: 0.1 K/UL (ref 0–0.1)
BASOPHILS NFR BLD: 0 % (ref 0–1)
BILIRUB SERPL-MCNC: 0.2 MG/DL (ref 0.2–1)
BUN SERPL-MCNC: 6 MG/DL (ref 6–20)
BUN/CREAT SERPL: 10 (ref 12–20)
CALCIUM SERPL-MCNC: 8.7 MG/DL (ref 8.5–10.1)
CHLORIDE SERPL-SCNC: 112 MMOL/L (ref 97–108)
CO2 SERPL-SCNC: 19 MMOL/L (ref 21–32)
CREAT SERPL-MCNC: 0.62 MG/DL (ref 0.55–1.02)
DIFFERENTIAL METHOD BLD: ABNORMAL
EOSINOPHIL # BLD: 0.2 K/UL (ref 0–0.4)
EOSINOPHIL NFR BLD: 1 % (ref 0–7)
ERYTHROCYTE [DISTWIDTH] IN BLOOD BY AUTOMATED COUNT: 14.3 % (ref 11.5–14.5)
GLOBULIN SER CALC-MCNC: 3.2 G/DL (ref 2–4)
GLUCOSE BLD STRIP.AUTO-MCNC: 106 MG/DL (ref 65–100)
GLUCOSE SERPL-MCNC: 110 MG/DL (ref 65–100)
HCT VFR BLD AUTO: 34.9 % (ref 35–47)
HGB BLD-MCNC: 11.5 G/DL (ref 11.5–16)
IMM GRANULOCYTES # BLD AUTO: 0.2 K/UL (ref 0–0.04)
IMM GRANULOCYTES NFR BLD AUTO: 1 % (ref 0–0.5)
LYMPHOCYTES # BLD: 1.9 K/UL (ref 0.8–3.5)
LYMPHOCYTES NFR BLD: 13 % (ref 12–49)
MCH RBC QN AUTO: 29.7 PG (ref 26–34)
MCHC RBC AUTO-ENTMCNC: 33 G/DL (ref 30–36.5)
MCV RBC AUTO: 90.2 FL (ref 80–99)
MONOCYTES # BLD: 0.7 K/UL (ref 0–1)
MONOCYTES NFR BLD: 5 % (ref 5–13)
NEUTS SEG # BLD: 11.8 K/UL (ref 1.8–8)
NEUTS SEG NFR BLD: 80 % (ref 32–75)
NRBC # BLD: 0 K/UL (ref 0–0.01)
NRBC BLD-RTO: 0 PER 100 WBC
PLATELET # BLD AUTO: 374 K/UL (ref 150–400)
PMV BLD AUTO: 9.4 FL (ref 8.9–12.9)
POTASSIUM SERPL-SCNC: 4 MMOL/L (ref 3.5–5.1)
PROT SERPL-MCNC: 5.7 G/DL (ref 6.4–8.2)
RBC # BLD AUTO: 3.87 M/UL (ref 3.8–5.2)
SERVICE CMNT-IMP: ABNORMAL
SODIUM SERPL-SCNC: 140 MMOL/L (ref 136–145)
WBC # BLD AUTO: 14.8 K/UL (ref 3.6–11)

## 2021-02-25 PROCEDURE — 75410000002 HC LABOR FEE PER 1 HR: Performed by: OBSTETRICS & GYNECOLOGY

## 2021-02-25 PROCEDURE — 65270000029 HC RM PRIVATE

## 2021-02-25 PROCEDURE — 74011250636 HC RX REV CODE- 250/636: Performed by: OBSTETRICS & GYNECOLOGY

## 2021-02-25 PROCEDURE — 82962 GLUCOSE BLOOD TEST: CPT

## 2021-02-25 PROCEDURE — 80053 COMPREHEN METABOLIC PANEL: CPT

## 2021-02-25 PROCEDURE — 74011636637 HC RX REV CODE- 636/637: Performed by: OBSTETRICS & GYNECOLOGY

## 2021-02-25 PROCEDURE — 36415 COLL VENOUS BLD VENIPUNCTURE: CPT

## 2021-02-25 PROCEDURE — 74011000258 HC RX REV CODE- 258: Performed by: OBSTETRICS & GYNECOLOGY

## 2021-02-25 PROCEDURE — 2709999900 HC NON-CHARGEABLE SUPPLY

## 2021-02-25 PROCEDURE — 74011250637 HC RX REV CODE- 250/637: Performed by: OBSTETRICS & GYNECOLOGY

## 2021-02-25 PROCEDURE — 85025 COMPLETE CBC W/AUTO DIFF WBC: CPT

## 2021-02-25 RX ORDER — VANCOMYCIN 2 GRAM/500 ML IN 0.9 % SODIUM CHLORIDE INTRAVENOUS
2000 ONCE
Status: COMPLETED | OUTPATIENT
Start: 2021-02-25 | End: 2021-02-26

## 2021-02-25 RX ORDER — MAG HYDROX/ALUMINUM HYD/SIMETH 200-200-20
30 SUSPENSION, ORAL (FINAL DOSE FORM) ORAL
Status: DISCONTINUED | OUTPATIENT
Start: 2021-02-25 | End: 2021-02-27

## 2021-02-25 RX ORDER — SODIUM CHLORIDE 0.9 % (FLUSH) 0.9 %
5-40 SYRINGE (ML) INJECTION AS NEEDED
Status: DISCONTINUED | OUTPATIENT
Start: 2021-02-25 | End: 2021-03-01 | Stop reason: HOSPADM

## 2021-02-25 RX ORDER — OXYTOCIN/RINGER'S LACTATE 30/500 ML
10 PLASTIC BAG, INJECTION (ML) INTRAVENOUS AS NEEDED
Status: DISCONTINUED | OUTPATIENT
Start: 2021-02-25 | End: 2021-03-01 | Stop reason: HOSPADM

## 2021-02-25 RX ORDER — SODIUM CHLORIDE 0.9 % (FLUSH) 0.9 %
5-40 SYRINGE (ML) INJECTION EVERY 8 HOURS
Status: DISCONTINUED | OUTPATIENT
Start: 2021-02-25 | End: 2021-02-27

## 2021-02-25 RX ORDER — OXYTOCIN/RINGER'S LACTATE 30/500 ML
87.3 PLASTIC BAG, INJECTION (ML) INTRAVENOUS AS NEEDED
Status: DISCONTINUED | OUTPATIENT
Start: 2021-02-25 | End: 2021-03-01 | Stop reason: HOSPADM

## 2021-02-25 RX ORDER — CITALOPRAM 20 MG/1
10 TABLET, FILM COATED ORAL DAILY
Status: DISCONTINUED | OUTPATIENT
Start: 2021-02-26 | End: 2021-02-25

## 2021-02-25 RX ORDER — SWAB
1 SWAB, NON-MEDICATED MISCELLANEOUS DAILY
Status: DISCONTINUED | OUTPATIENT
Start: 2021-02-26 | End: 2021-02-27 | Stop reason: SDUPTHER

## 2021-02-25 RX ORDER — NALBUPHINE HYDROCHLORIDE 10 MG/ML
10 INJECTION, SOLUTION INTRAMUSCULAR; INTRAVENOUS; SUBCUTANEOUS
Status: DISCONTINUED | OUTPATIENT
Start: 2021-02-25 | End: 2021-03-01 | Stop reason: HOSPADM

## 2021-02-25 RX ORDER — CITALOPRAM 20 MG/1
10 TABLET, FILM COATED ORAL DAILY
Status: DISCONTINUED | OUTPATIENT
Start: 2021-02-25 | End: 2021-03-01 | Stop reason: HOSPADM

## 2021-02-25 RX ORDER — FAMOTIDINE 20 MG/1
20 TABLET, FILM COATED ORAL
Status: DISCONTINUED | OUTPATIENT
Start: 2021-02-25 | End: 2021-03-01 | Stop reason: HOSPADM

## 2021-02-25 RX ORDER — ONDANSETRON 2 MG/ML
4 INJECTION INTRAMUSCULAR; INTRAVENOUS
Status: DISCONTINUED | OUTPATIENT
Start: 2021-02-25 | End: 2021-02-27 | Stop reason: HOSPADM

## 2021-02-25 RX ORDER — MAG HYDROX/ALUMINUM HYD/SIMETH 200-200-20
30 SUSPENSION, ORAL (FINAL DOSE FORM) ORAL
Status: DISCONTINUED | OUTPATIENT
Start: 2021-02-25 | End: 2021-02-27 | Stop reason: HOSPADM

## 2021-02-25 RX ADMIN — INSULIN HUMAN 30 UNITS: 100 INJECTION, SUSPENSION SUBCUTANEOUS at 22:52

## 2021-02-25 RX ADMIN — FAMOTIDINE 20 MG: 20 TABLET ORAL at 21:17

## 2021-02-25 RX ADMIN — NALBUPHINE HYDROCHLORIDE 10 MG: 10 INJECTION, SOLUTION INTRAMUSCULAR; INTRAVENOUS; SUBCUTANEOUS at 19:47

## 2021-02-25 RX ADMIN — SODIUM CHLORIDE 12.5 MG: 9 INJECTION, SOLUTION INTRAVENOUS at 20:25

## 2021-02-25 RX ADMIN — VANCOMYCIN HYDROCHLORIDE 2000 MG: 5 INJECTION, POWDER, LYOPHILIZED, FOR SOLUTION INTRAVENOUS at 22:54

## 2021-02-25 RX ADMIN — LABETALOL HYDROCHLORIDE 300 MG: 100 TABLET, FILM COATED ORAL at 22:52

## 2021-02-25 RX ADMIN — CITALOPRAM HYDROBROMIDE 10 MG: 20 TABLET ORAL at 21:17

## 2021-02-25 NOTE — PROGRESS NOTES
1645: Patient arrived to L&D room 210 for scheduled induction. Pt oriented to room and unit, plan of care discussed with pt, pt verbalized understanding. 1740: Dr. Bony Rausch at bedside to place chavez bulb. SVE per MD 1 cm. Josph Presto balloon placed by MD and inflated with 50/50 cc of sterile water. Pt tolerated well. MD stated to keep pt on EFM for 1 hour after cook balloon placement. 1900: Bedside and Verbal shift change report given to Geronimo Mari Dr (oncoming nurse) by Dwayne Conn RN (offgoing nurse). Report included the following information SBAR, Kardex, Intake/Output and MAR.

## 2021-02-25 NOTE — H&P
Obstetrics Admission History & Physical    Name: Breanne Fraser MRN: [id number]  SSN: [id number]    YOB: 1985  Age: 37 y.o. Sex: female      Subjective:     Reason for Admission:  Pregnancy and Chronic Hypertension and Diabetes Gestational on insulin QHS    History of Present Illness: Breanne Fraser is a 37 y.o.  female with an estimated gestational age of 41w0d with Estimated Date of Delivery: 3/18/21. Patient presents for scheduled induction of labor. Patient denies contractions, nausea and vomiting, right upper quadrant pain  , shortness of breath, vaginal bleeding , vaginal leaking of fluid  and visual disturbances.     OB History        5    Para        Term                AB   4    Living   0       SAB   4    TAB        Ectopic        Molar        Multiple        Live Births                  Past Medical History:   Diagnosis Date    Anemia     receiving IV iron last dose in 2020    Asthma     inhaler PRN    Epilepsy [facility])     2014    Essential hypertension     Gestational diabetes     Heart abnormality     mild heart murmur no medications required    Herpes simplex virus (HSV) infection     Infertility, female     Liver disease     Polycystic disease, ovaries     Psychiatric problem     depression     Past Surgical History:   Procedure Laterality Date    HX OTHER SURGICAL      appendix, gallbladder, tonsils & wisdom teeth    DE GASTRIC BYPASS,OBESE<150CM [id number]-EN-Y      pyloraplasty      Social History     Socioeconomic History    Marital status: SINGLE     Spouse name: Not on file    Number of children: Not on file    Years of education: Not on file    Highest education level: High school graduate   Occupational History    Occupation: PCA   Social Needs    Financial resource strain: Not very hard    Food insecurity     Worry: Never true     Inability: Never true    Transportation needs     Medical: No     Non-medical: No   Tobacco Use    Smoking status: Former Smoker    Smokeless tobacco: Never Used   Substance and Sexual Activity    Alcohol use: Never     Frequency: Never    Drug use: Yes     Frequency: 7.0 times per week     Types: Marijuana     Comment: medically purposes due to PTSD    Sexual activity: Not on file   Lifestyle    Physical activity     Days per week: Not on file     Minutes per session: Not on file    Stress: Not on file   Relationships    Social connections     Talks on phone: Not on file     Gets together: Not on file     Attends Voodoo service: Not on file     Active member of club or organization: Not on file     Attends meetings of clubs or organizations: Not on file     Relationship status: Not on file    Intimate partner violence     Fear of current or ex partner: Not on file     Emotionally abused: Not on file     Physically abused: Not on file     Forced sexual activity: Not on file   Other Topics Concern     Service Not Asked    Blood Transfusions Not Asked    Caffeine Concern Not Asked    Occupational Exposure Not Asked   [place] Hazards Not Asked    Sleep Concern Not Asked    Stress Concern Not Asked    Weight Concern Not Asked    Special Diet Not Asked    Back Care Not Asked    Exercise Not Asked    Bike Helmet Not Asked   [address] Not Asked    Self-Exams Not Asked   Social History Narrative    Not on file     Family History   Problem Relation Age of Onset    Hypertension Mother     Diabetes Paternal Uncle     Cancer Paternal Grandmother     Stroke Paternal Grandmother     Alcohol abuse Paternal Grandfather      Allergies   Allergen Reactions    Latex Rash    Amoxicillin Rash and Swelling    Pcn [Penicillins] Rash and Swelling    Sulfa (Sulfonamide Antibiotics) Rash and Swelling     Prior to Admission medications    Medication Sig Start Date End Date Taking? Authorizing Provider   labetaloL (NORMODYNE) 300 mg tablet Take 1 Tab by mouth three (3) times daily.  2/23/21   Malik Donald Cristopher Reno MD   PNV Comb #2-Iron-Omega 3-FA [id number] mg cmpk Take  by mouth. Provider, Historical   insulin NPH (HumuLIN N NPH U-100 Insulin) 100 unit/mL injection 30 Units by SubCUTAneous route nightly. Indications: diabetes during pregnancy    Provider, Historical   citalopram (CeleXA) 10 mg tablet Take 10 mg by mouth daily. Indications: anxiousness associated with depression, posttraumatic stress syndrome    Provider, Historical   famotidine (Pepcid AC) 20 mg tablet Take 20 mg by mouth nightly. Indications: heartburn    Provider, Historical   calcium carbonate (TUMS) 200 mg calcium (500 mg) chew Take 1 Tab by mouth once as needed. Indications: heartburn    Provider, Historical   aspirin delayed-release 81 mg tablet Take 81 mg by mouth daily. Provider, Historical        Review of Systems:  A comprehensive review of systems was negative except for that written in the History of Present Illness. Objective:     Vitals:  Blood pressure (!) 135/90, pulse 85, temperature 98 °F (36.7 °C), resp. rate 16, height 5' 7\" (1.702 m), weight 105.2 kg (232 lb), SpO2 97 %. Temp (24hrs), Av °F (36.7 °C), Min:98 °F (36.7 °C), Max:98 °F (36.7 °C)    BP  Min: 135/90  Max: 135/90     Physical Exam:  Patient without distress.   Heart: Regular rate and rhythm  Lung: normal respiratory effort  Abdomen: soft, nontender  Fundus: soft and non tender  Perineum: blood absent, amniotic fluid absent  Cervical Exam:  / / /   Lower Extremities:  - Edema 1+       Membranes:  Intact    Uterine Activity:  None    Fetal Heart Rate:  Reactive       Labs:   Recent Results (from the past 24 hour(s))   CBC WITH AUTOMATED DIFF    Collection Time: 21  5:08 PM   Result Value Ref Range    WBC 14.8 (H) 3.6 - 11.0 K/uL    RBC 3.87 3.80 - 5.20 M/uL    HGB 11.5 11.5 - 16.0 g/dL    HCT 34.9 (L) 35.0 - 47.0 %    MCV 90.2 80.0 - 99.0 FL    MCH 29.7 26.0 - 34.0 PG    MCHC 33.0 30.0 - 36.5 g/dL    RDW 14.3 11.5 - 14.5 %    PLATELET 500 410 - 419 K/uL    MPV 9.4 8.9 - 12.9 FL    NRBC 0.0 0  WBC    ABSOLUTE NRBC 0.00 0.00 - 0.01 K/uL    NEUTROPHILS 80 (H) 32 - 75 %    LYMPHOCYTES 13 12 - 49 %    MONOCYTES 5 5 - 13 %    EOSINOPHILS 1 0 - 7 %    BASOPHILS 0 0 - 1 %    IMMATURE GRANULOCYTES 1 (H) 0.0 - 0.5 %    ABS. NEUTROPHILS 11.8 (H) 1.8 - 8.0 K/UL    ABS. LYMPHOCYTES 1.9 0.8 - 3.5 K/UL    ABS. MONOCYTES 0.7 0.0 - 1.0 K/UL    ABS. EOSINOPHILS 0.2 0.0 - 0.4 K/UL    ABS. BASOPHILS 0.1 0.0 - 0.1 K/UL    ABS. IMM. GRANS. 0.2 (H) 0.00 - 0.04 K/UL    DF AUTOMATED         Assessment and Plan:     - Diabetes-Gestational:  Continue diabetic diet and QHS insulin  - Hypertension - continue po meds  - Admit for induction of labor. Cook catheter placed using sterile technique.  Start pitocin in AM  - GBS+, PCN allergy with hx swelling, vancomycin for prophylaxis    Signed By:  Cornelio Barrios MD     February 25, 2021

## 2021-02-26 ENCOUNTER — ANESTHESIA (OUTPATIENT)
Dept: LABOR AND DELIVERY | Age: 36
DRG: 540 | End: 2021-02-26
Payer: MEDICAID

## 2021-02-26 ENCOUNTER — ANESTHESIA EVENT (OUTPATIENT)
Dept: LABOR AND DELIVERY | Age: 36
DRG: 540 | End: 2021-02-26
Payer: MEDICAID

## 2021-02-26 LAB
GLUCOSE BLD STRIP.AUTO-MCNC: 105 MG/DL (ref 65–100)
GLUCOSE BLD STRIP.AUTO-MCNC: 107 MG/DL (ref 65–100)
GLUCOSE BLD STRIP.AUTO-MCNC: 86 MG/DL (ref 65–100)
GLUCOSE BLD STRIP.AUTO-MCNC: 99 MG/DL (ref 65–100)
SERVICE CMNT-IMP: ABNORMAL
SERVICE CMNT-IMP: ABNORMAL
SERVICE CMNT-IMP: NORMAL
SERVICE CMNT-IMP: NORMAL

## 2021-02-26 PROCEDURE — 74011250637 HC RX REV CODE- 250/637: Performed by: OBSTETRICS & GYNECOLOGY

## 2021-02-26 PROCEDURE — 76010000391 HC C SECN FIRST 1 HR: Performed by: OBSTETRICS & GYNECOLOGY

## 2021-02-26 PROCEDURE — 77030010848 HC CATH INTUTR PRSS KOLB -B

## 2021-02-26 PROCEDURE — 74011250636 HC RX REV CODE- 250/636: Performed by: OBSTETRICS & GYNECOLOGY

## 2021-02-26 PROCEDURE — 75410000002 HC LABOR FEE PER 1 HR: Performed by: OBSTETRICS & GYNECOLOGY

## 2021-02-26 PROCEDURE — 65270000029 HC RM PRIVATE

## 2021-02-26 PROCEDURE — 74011000250 HC RX REV CODE- 250: Performed by: ANESTHESIOLOGY

## 2021-02-26 PROCEDURE — 3E033VJ INTRODUCTION OF OTHER HORMONE INTO PERIPHERAL VEIN, PERCUTANEOUS APPROACH: ICD-10-PCS | Performed by: OBSTETRICS & GYNECOLOGY

## 2021-02-26 PROCEDURE — 74011250636 HC RX REV CODE- 250/636: Performed by: ANESTHESIOLOGY

## 2021-02-26 PROCEDURE — 10907ZC DRAINAGE OF AMNIOTIC FLUID, THERAPEUTIC FROM PRODUCTS OF CONCEPTION, VIA NATURAL OR ARTIFICIAL OPENING: ICD-10-PCS | Performed by: OBSTETRICS & GYNECOLOGY

## 2021-02-26 PROCEDURE — 76060000078 HC EPIDURAL ANESTHESIA: Performed by: OBSTETRICS & GYNECOLOGY

## 2021-02-26 PROCEDURE — 2709999900 HC NON-CHARGEABLE SUPPLY

## 2021-02-26 PROCEDURE — 10H07YZ INSERTION OF OTHER DEVICE INTO PRODUCTS OF CONCEPTION, VIA NATURAL OR ARTIFICIAL OPENING: ICD-10-PCS | Performed by: OBSTETRICS & GYNECOLOGY

## 2021-02-26 PROCEDURE — 75410000003 HC RECOV DEL/VAG/CSECN EA 0.5 HR: Performed by: OBSTETRICS & GYNECOLOGY

## 2021-02-26 PROCEDURE — 00HU33Z INSERTION OF INFUSION DEVICE INTO SPINAL CANAL, PERCUTANEOUS APPROACH: ICD-10-PCS | Performed by: ANESTHESIOLOGY

## 2021-02-26 PROCEDURE — 74011000258 HC RX REV CODE- 258: Performed by: ANESTHESIOLOGY

## 2021-02-26 PROCEDURE — 82962 GLUCOSE BLOOD TEST: CPT

## 2021-02-26 PROCEDURE — 76010000392 HC C SECN EA ADDL 0.5 HR: Performed by: OBSTETRICS & GYNECOLOGY

## 2021-02-26 PROCEDURE — 74011000258 HC RX REV CODE- 258: Performed by: OBSTETRICS & GYNECOLOGY

## 2021-02-26 PROCEDURE — 77030014125 HC TY EPDRL BBMI -B: Performed by: ANESTHESIOLOGY

## 2021-02-26 PROCEDURE — 77030005513 HC CATH URETH FOL11 MDII -B

## 2021-02-26 RX ORDER — SODIUM CHLORIDE, SODIUM LACTATE, POTASSIUM CHLORIDE, CALCIUM CHLORIDE 600; 310; 30; 20 MG/100ML; MG/100ML; MG/100ML; MG/100ML
125 INJECTION, SOLUTION INTRAVENOUS CONTINUOUS
Status: DISCONTINUED | OUTPATIENT
Start: 2021-02-26 | End: 2021-03-01 | Stop reason: HOSPADM

## 2021-02-26 RX ORDER — EPHEDRINE SULFATE/0.9% NACL/PF 50 MG/5 ML
20 SYRINGE (ML) INTRAVENOUS
Status: DISCONTINUED | OUTPATIENT
Start: 2021-02-26 | End: 2021-02-27 | Stop reason: HOSPADM

## 2021-02-26 RX ORDER — LIDOCAINE HYDROCHLORIDE AND EPINEPHRINE 15; 5 MG/ML; UG/ML
INJECTION, SOLUTION EPIDURAL AS NEEDED
Status: DISCONTINUED | OUTPATIENT
Start: 2021-02-26 | End: 2021-02-27 | Stop reason: HOSPADM

## 2021-02-26 RX ORDER — OXYTOCIN/RINGER'S LACTATE 30/500 ML
1-25 PLASTIC BAG, INJECTION (ML) INTRAVENOUS
Status: DISCONTINUED | OUTPATIENT
Start: 2021-02-26 | End: 2021-03-01 | Stop reason: HOSPADM

## 2021-02-26 RX ORDER — OXYTOCIN 10 [USP'U]/ML
INJECTION, SOLUTION INTRAMUSCULAR; INTRAVENOUS AS NEEDED
Status: DISCONTINUED | OUTPATIENT
Start: 2021-02-26 | End: 2021-02-27 | Stop reason: HOSPADM

## 2021-02-26 RX ORDER — BUPIVACAINE HYDROCHLORIDE 2.5 MG/ML
INJECTION, SOLUTION EPIDURAL; INFILTRATION; INTRACAUDAL AS NEEDED
Status: DISCONTINUED | OUTPATIENT
Start: 2021-02-26 | End: 2021-02-27 | Stop reason: HOSPADM

## 2021-02-26 RX ORDER — LIDOCAINE HYDROCHLORIDE AND EPINEPHRINE 20; 5 MG/ML; UG/ML
INJECTION, SOLUTION EPIDURAL; INFILTRATION; INTRACAUDAL; PERINEURAL AS NEEDED
Status: DISCONTINUED | OUTPATIENT
Start: 2021-02-26 | End: 2021-02-27 | Stop reason: HOSPADM

## 2021-02-26 RX ORDER — FENTANYL/BUPIVACAINE/NS/PF 2-1250MCG
1-16 PREFILLED PUMP RESERVOIR EPIDURAL CONTINUOUS
Status: DISCONTINUED | OUTPATIENT
Start: 2021-02-26 | End: 2021-03-01 | Stop reason: HOSPADM

## 2021-02-26 RX ORDER — ONDANSETRON 2 MG/ML
INJECTION INTRAMUSCULAR; INTRAVENOUS AS NEEDED
Status: DISCONTINUED | OUTPATIENT
Start: 2021-02-26 | End: 2021-02-27 | Stop reason: HOSPADM

## 2021-02-26 RX ADMIN — TRANEXAMIC ACID 1 G: 100 INJECTION, SOLUTION INTRAVENOUS at 23:33

## 2021-02-26 RX ADMIN — LIDOCAINE HYDROCHLORIDE,EPINEPHRINE BITARTRATE 10 ML: 20; .005 INJECTION, SOLUTION EPIDURAL; INFILTRATION; INTRACAUDAL; PERINEURAL at 22:58

## 2021-02-26 RX ADMIN — OXYTOCIN 40 UNITS: 10 INJECTION, SOLUTION INTRAMUSCULAR; INTRAVENOUS at 23:31

## 2021-02-26 RX ADMIN — LIDOCAINE HYDROCHLORIDE,EPINEPHRINE BITARTRATE 5 ML: 20; .005 INJECTION, SOLUTION EPIDURAL; INFILTRATION; INTRACAUDAL; PERINEURAL at 23:00

## 2021-02-26 RX ADMIN — BUPIVACAINE HYDROCHLORIDE 10 ML: 2.5 INJECTION, SOLUTION EPIDURAL; INFILTRATION; INTRACAUDAL; PERINEURAL at 09:34

## 2021-02-26 RX ADMIN — ONDANSETRON 4 MG: 2 INJECTION INTRAMUSCULAR; INTRAVENOUS at 20:46

## 2021-02-26 RX ADMIN — VANCOMYCIN HYDROCHLORIDE 1000 MG: 1 INJECTION, POWDER, LYOPHILIZED, FOR SOLUTION INTRAVENOUS at 09:50

## 2021-02-26 RX ADMIN — SODIUM CHLORIDE 12.5 MG: 9 INJECTION, SOLUTION INTRAVENOUS at 08:33

## 2021-02-26 RX ADMIN — SODIUM CHLORIDE, POTASSIUM CHLORIDE, SODIUM LACTATE AND CALCIUM CHLORIDE 125 ML/HR: 600; 310; 30; 20 INJECTION, SOLUTION INTRAVENOUS at 16:25

## 2021-02-26 RX ADMIN — ONDANSETRON HYDROCHLORIDE 4 MG: 2 SOLUTION INTRAMUSCULAR; INTRAVENOUS at 23:27

## 2021-02-26 RX ADMIN — BUPIVACAINE HYDROCHLORIDE 10 ML: 2.5 INJECTION, SOLUTION EPIDURAL; INFILTRATION; INTRACAUDAL; PERINEURAL at 13:57

## 2021-02-26 RX ADMIN — LABETALOL HYDROCHLORIDE 300 MG: 100 TABLET, FILM COATED ORAL at 22:02

## 2021-02-26 RX ADMIN — ONDANSETRON 4 MG: 2 INJECTION INTRAMUSCULAR; INTRAVENOUS at 15:49

## 2021-02-26 RX ADMIN — LIDOCAINE HYDROCHLORIDE AND EPINEPHRINE 3 ML: 15; 5 INJECTION, SOLUTION EPIDURAL at 09:35

## 2021-02-26 RX ADMIN — LIDOCAINE HYDROCHLORIDE,EPINEPHRINE BITARTRATE 5 ML: 20; .005 INJECTION, SOLUTION EPIDURAL; INFILTRATION; INTRACAUDAL; PERINEURAL at 23:03

## 2021-02-26 RX ADMIN — CITALOPRAM HYDROBROMIDE 10 MG: 20 TABLET ORAL at 22:07

## 2021-02-26 RX ADMIN — SODIUM CHLORIDE, POTASSIUM CHLORIDE, SODIUM LACTATE AND CALCIUM CHLORIDE: 600; 310; 30; 20 INJECTION, SOLUTION INTRAVENOUS at 23:06

## 2021-02-26 RX ADMIN — LABETALOL HYDROCHLORIDE 300 MG: 100 TABLET, FILM COATED ORAL at 16:24

## 2021-02-26 RX ADMIN — Medication 1 TABLET: at 08:33

## 2021-02-26 RX ADMIN — Medication 10 ML/HR: at 09:49

## 2021-02-26 RX ADMIN — LABETALOL HYDROCHLORIDE 300 MG: 100 TABLET, FILM COATED ORAL at 08:33

## 2021-02-26 RX ADMIN — SODIUM CHLORIDE, POTASSIUM CHLORIDE, SODIUM LACTATE AND CALCIUM CHLORIDE 125 ML/HR: 600; 310; 30; 20 INJECTION, SOLUTION INTRAVENOUS at 09:41

## 2021-02-26 RX ADMIN — SODIUM CHLORIDE, POTASSIUM CHLORIDE, SODIUM LACTATE AND CALCIUM CHLORIDE 999 ML/HR: 600; 310; 30; 20 INJECTION, SOLUTION INTRAVENOUS at 08:09

## 2021-02-26 RX ADMIN — Medication 10 ML/HR: at 17:05

## 2021-02-26 RX ADMIN — OXYTOCIN 2 MILLI-UNITS/MIN: 10 INJECTION, SOLUTION INTRAMUSCULAR; INTRAVENOUS at 06:05

## 2021-02-26 RX ADMIN — VANCOMYCIN HYDROCHLORIDE 1000 MG: 1 INJECTION, POWDER, LYOPHILIZED, FOR SOLUTION INTRAVENOUS at 21:59

## 2021-02-26 RX ADMIN — ONDANSETRON 4 MG: 2 INJECTION INTRAMUSCULAR; INTRAVENOUS at 04:44

## 2021-02-26 RX ADMIN — GENTAMICIN SULFATE 400 MG: 40 INJECTION, SOLUTION INTRAMUSCULAR; INTRAVENOUS at 23:37

## 2021-02-26 RX ADMIN — FAMOTIDINE 20 MG: 20 TABLET ORAL at 22:02

## 2021-02-26 RX ADMIN — Medication 12 ML/HR: at 20:29

## 2021-02-26 NOTE — PROGRESS NOTES
Labor Progress Note  Patient seen, fetal heart rate and contraction pattern evaluated, patient examined. The patient received a re-dose of her epidural.     Patient Vitals for the past 2 hrs:   BP Pulse SpO2   02/26/21 1420 (!) 147/85 63 --   02/26/21 1418 -- -- 100 %   02/26/21 1404 139/75 (!) 59 --   02/26/21 1403 -- -- 99 %   02/26/21 1338 (!) 165/92 67 100 %       Physical Exam:  Cervical Exam:  4 cm dilated  50% effaced  -3 station   Replaced IUPC that came out. Presenting Part: cephalic  Uterine Activity: Frequency: Every 2-3 minutes  Fetal Heart Rate: Baseline: 135 per minute  Variability: moderate  Accelerations: no  Decelerations: variable spontaneous and uncommon. Assessment/Plan:  Reassuring fetal status, Continue plan for vaginal delivery   Continue to titrate Pitocin to reach 200 MVUs  CHTN: Continue labetalol 300mg TID  A2GDM: q4h FS. GBS Positive: Continue Vanco as scheduled. Now s/p 2 doses.        Karime Hahn MD

## 2021-02-26 NOTE — PROGRESS NOTES
Labor Progress Note  Patient seen, fetal heart rate and contraction pattern evaluated, patient examined. Feels the CTXs. Desires epidural now. Patient Vitals for the past 2 hrs:   BP Temp Pulse Resp   02/26/21 0734 (!) 146/83 98.5 °F (36.9 °C) 70 16   02/26/21 0655 137/69 -- 65 --       Physical Exam:  Cervical Exam:  Deferred. Jorge Anibal Cath came out at 0300. Uterine Activity: Frequency: Every 2-4 minutes  Pit 6  Fetal Heart Rate: Reactive  Baseline: 125 per minute  Variability: moderate  Accelerations: yes  Decelerations: none    Assessment/Plan:  Reassuring fetal status   Will get epidural and then AROM.   GBS: Vanco for prophylaxis with second dose at 1000 today  Cat 1 FHT  Place FSE/IUPC after AROM    Anna Hodge MD

## 2021-02-26 NOTE — PROGRESS NOTES
Labor Progress Note    Patient seen, fetal heart rate and contraction pattern evaluated, patient examined.   Comfortable with her epidural. S/p Nadyao#2    Patient Vitals for the past 2 hrs:   BP Temp Pulse SpO2   02/26/21 1106 -- 97.8 °F (36.6 °C) -- --   02/26/21 1103 -- -- -- 97 %   02/26/21 1058 -- -- -- 98 %   02/26/21 1053 -- -- -- 98 %   02/26/21 1052 137/82 -- 84 --   02/26/21 1048 -- -- -- 97 %   02/26/21 1043 -- -- -- 98 %   02/26/21 1038 -- -- -- 96 %   02/26/21 1036 (!) 145/90 -- 81 --   02/26/21 1033 -- -- -- 97 %   02/26/21 1028 -- -- -- 96 %   02/26/21 1023 -- -- -- 96 %   02/26/21 1021 (!) 142/80 -- 71 --   02/26/21 1018 -- -- -- 96 %   02/26/21 1013 -- -- -- 98 %   02/26/21 1008 -- -- -- 99 %   02/26/21 1007 (!) 141/81 -- 71 --   02/26/21 1003 -- -- -- 100 %   02/26/21 0958 -- -- -- 99 %   02/26/21 0953 -- -- -- 98 %   02/26/21 0951 138/79 -- 70 --   02/26/21 0948 -- -- -- 99 %   02/26/21 0947 137/75 -- 75 --   02/26/21 0943 -- -- -- 99 %   02/26/21 0940 (!) 151/82 -- 78 --   02/26/21 0938 134/73 -- 73 100 %   02/26/21 0936 (!) 144/75 -- 71 --   02/26/21 0934 139/76 -- 75 --   02/26/21 0933 -- -- -- 99 %   02/26/21 0928 -- -- -- 100 %       Physical Exam:  Cervical Exam:  3 cm dilated  50% effaced  -2 station    Presenting Part: cephalic  Cervical Position: mid position  Consistency: Soft  AROM clear fluid  FSE/IUPC placed  Uterine Activity: Frequency: Every 2-3 minutes; Pitocin 10  Fetal Heart Rate: Reactive  Baseline: 125 per minute  Variability: moderate  Accelerations: yes  Decelerations: none    Assessment/Plan:  Reassuring fetal status, Continue plan for vaginal delivery   Placed FSE/IUPC  Cat 1 Cecil Farr MD

## 2021-02-26 NOTE — PROGRESS NOTES
07:05- OB SBAR report received from TYRESE Sadler    08:30- MD Shani gives VORB to check BG Q4 hr in early labor and Q2 hr in active labor. Plan to return around 10:00 to perform SVE and AROM and place internal monitors at that time. Discussed desire for epidural placement prior to exam. Fluid bolus started for epidural.     09:00- Pt sitting up in bed to eat clear liquids. 09:16- Pt up to BR to void. MD Shani notified of treatable BPs. Pt to get epidural shortly and has been given 300mg labetalol po. MD states to not give any additional BP meds at this time. Get pt epidural which will likely improve BP    10:42- POC     10:47- Pt sitting up in bed d/t nausea. 1 episode of emesis    11:30- Pt turned to left side and peanut ball positioned between knees. 13:15- Pt pads changed. Sitting up to eat clear liquids. C/o increased pain with last ctx. Used PCA. Will continue to monitor    14:24- Pt states pain improved after anesthesia bolus. Turned pt to left side and peanut ball positioned between knees. Medrano catheter emptied. 15:11- MD Shani at bedside. IUPC replaced as it had come out of uterus. Pt 4cm. Will continue with pitocin and position changes    15:18- BG 99    15:58- Pt resting on right side with peanut ball c/o of nausea. RN administers zofran per orders. 18:55- Bedside and Verbal shift change report given to KISHAN Childers RN (oncoming nurse) by Eri Ackerman (offgoing nurse). Report included the following information SBAR, Procedure Summary, Intake/Output, MAR, Accordion and Recent Results.

## 2021-02-26 NOTE — ANESTHESIA PROCEDURE NOTES
Epidural Block    Patient location during procedure: OR  Start time: 2/26/2021 9:28 AM  End time: 2/26/2021 9:39 AM  Reason for block: labor epidural  Staffing  Performed: attending   Preanesthetic Checklist  Completed: patient identified, risks and benefits discussed, surgical consent, pre-op evaluation and timeout performed  Block Placement  Patient position: sitting  Prep: Betadine  Sterility prep: cap, drape, gloves and mask  Sedation level: no sedation  Patient monitoring: ETCO2  Approach: midline  Location: lumbar  Epidural  Loss of resistance technique: saline  Guidance: landmark technique  Needle  Needle type: Tuohy   Needle gauge: 16 G  Catheter type: multi-orifice  Catheter size: 20 G  Catheter securement method: clear occlusive dressing and surgical tape  Test dose: negative  Assessment  Number of attempts: 1  Procedure assessment: patient tolerated procedure well with no complications

## 2021-02-26 NOTE — ANESTHESIA PREPROCEDURE EVALUATION
Relevant Problems   No relevant active problems       Anesthetic History               Review of Systems / Medical History  Patient summary reviewed and pertinent labs reviewed    Pulmonary            Asthma        Neuro/Psych     seizures    Psychiatric history     Cardiovascular    Hypertension                   GI/Hepatic/Renal                Endo/Other    Diabetes         Other Findings              Physical Exam    Airway  Mallampati: II  TM Distance: 4 - 6 cm  Neck ROM: normal range of motion   Mouth opening: Normal     Cardiovascular  Regular rate and rhythm,  S1 and S2 normal,  no murmur, click, rub, or gallop  Rhythm: regular  Rate: normal         Dental  No notable dental hx       Pulmonary  Breath sounds clear to auscultation               Abdominal  GI exam deferred       Other Findings            Anesthetic Plan    ASA: 3  Anesthesia type: epidural            Anesthetic plan and risks discussed with: Patient      Charting completed after epidural placement.

## 2021-02-27 PROCEDURE — 74011000258 HC RX REV CODE- 258: Performed by: OBSTETRICS & GYNECOLOGY

## 2021-02-27 PROCEDURE — 74011250637 HC RX REV CODE- 250/637: Performed by: OBSTETRICS & GYNECOLOGY

## 2021-02-27 PROCEDURE — 2709999900 HC NON-CHARGEABLE SUPPLY

## 2021-02-27 PROCEDURE — 74011250636 HC RX REV CODE- 250/636: Performed by: OBSTETRICS & GYNECOLOGY

## 2021-02-27 PROCEDURE — 65270000029 HC RM PRIVATE

## 2021-02-27 PROCEDURE — 74011250636 HC RX REV CODE- 250/636: Performed by: ANESTHESIOLOGY

## 2021-02-27 RX ORDER — NIFEDIPINE 30 MG/1
90 TABLET, EXTENDED RELEASE ORAL DAILY
Status: DISCONTINUED | OUTPATIENT
Start: 2021-02-27 | End: 2021-03-01 | Stop reason: HOSPADM

## 2021-02-27 RX ORDER — DOCUSATE SODIUM 100 MG/1
100 CAPSULE, LIQUID FILLED ORAL 2 TIMES DAILY
Status: DISCONTINUED | OUTPATIENT
Start: 2021-02-27 | End: 2021-03-01 | Stop reason: HOSPADM

## 2021-02-27 RX ORDER — ENOXAPARIN SODIUM 100 MG/ML
40 INJECTION SUBCUTANEOUS EVERY 24 HOURS
Status: DISCONTINUED | OUTPATIENT
Start: 2021-02-27 | End: 2021-03-01 | Stop reason: HOSPADM

## 2021-02-27 RX ORDER — OXYTOCIN/RINGER'S LACTATE 30/500 ML
10 PLASTIC BAG, INJECTION (ML) INTRAVENOUS AS NEEDED
Status: DISCONTINUED | OUTPATIENT
Start: 2021-02-27 | End: 2021-03-01 | Stop reason: HOSPADM

## 2021-02-27 RX ORDER — NALOXONE HYDROCHLORIDE 0.4 MG/ML
0.4 INJECTION, SOLUTION INTRAMUSCULAR; INTRAVENOUS; SUBCUTANEOUS AS NEEDED
Status: DISCONTINUED | OUTPATIENT
Start: 2021-02-27 | End: 2021-03-01 | Stop reason: HOSPADM

## 2021-02-27 RX ORDER — KETOROLAC TROMETHAMINE 30 MG/ML
INJECTION, SOLUTION INTRAMUSCULAR; INTRAVENOUS
Status: DISPENSED
Start: 2021-02-27 | End: 2021-02-27

## 2021-02-27 RX ORDER — ONDANSETRON 2 MG/ML
4 INJECTION INTRAMUSCULAR; INTRAVENOUS
Status: DISCONTINUED | OUTPATIENT
Start: 2021-02-27 | End: 2021-03-01 | Stop reason: HOSPADM

## 2021-02-27 RX ORDER — HYDROMORPHONE HYDROCHLORIDE 2 MG/ML
1 INJECTION, SOLUTION INTRAMUSCULAR; INTRAVENOUS; SUBCUTANEOUS
Status: DISCONTINUED | OUTPATIENT
Start: 2021-02-27 | End: 2021-03-01 | Stop reason: HOSPADM

## 2021-02-27 RX ORDER — SODIUM CHLORIDE, SODIUM LACTATE, POTASSIUM CHLORIDE, CALCIUM CHLORIDE 600; 310; 30; 20 MG/100ML; MG/100ML; MG/100ML; MG/100ML
125 INJECTION, SOLUTION INTRAVENOUS CONTINUOUS
Status: DISCONTINUED | OUTPATIENT
Start: 2021-02-27 | End: 2021-03-01 | Stop reason: HOSPADM

## 2021-02-27 RX ORDER — OXYCODONE HYDROCHLORIDE 5 MG/1
5 TABLET ORAL
Status: DISCONTINUED | OUTPATIENT
Start: 2021-02-27 | End: 2021-02-27

## 2021-02-27 RX ORDER — OXYCODONE HYDROCHLORIDE 5 MG/1
10 TABLET ORAL
Status: DISCONTINUED | OUTPATIENT
Start: 2021-02-27 | End: 2021-02-27

## 2021-02-27 RX ORDER — LABETALOL 200 MG/1
200 TABLET, FILM COATED ORAL
Status: DISCONTINUED | OUTPATIENT
Start: 2021-02-27 | End: 2021-03-01 | Stop reason: HOSPADM

## 2021-02-27 RX ORDER — NALOXONE HYDROCHLORIDE 0.4 MG/ML
0.2 INJECTION, SOLUTION INTRAMUSCULAR; INTRAVENOUS; SUBCUTANEOUS
Status: ACTIVE | OUTPATIENT
Start: 2021-02-27 | End: 2021-02-28

## 2021-02-27 RX ORDER — IBUPROFEN 800 MG/1
800 TABLET ORAL EVERY 8 HOURS
Status: DISCONTINUED | OUTPATIENT
Start: 2021-02-27 | End: 2021-03-01 | Stop reason: HOSPADM

## 2021-02-27 RX ORDER — KETOROLAC TROMETHAMINE 30 MG/ML
30 INJECTION, SOLUTION INTRAMUSCULAR; INTRAVENOUS
Status: ACTIVE | OUTPATIENT
Start: 2021-02-27 | End: 2021-02-28

## 2021-02-27 RX ORDER — SIMETHICONE 80 MG
80 TABLET,CHEWABLE ORAL AS NEEDED
Status: DISCONTINUED | OUTPATIENT
Start: 2021-02-27 | End: 2021-03-01 | Stop reason: HOSPADM

## 2021-02-27 RX ORDER — OXYTOCIN/RINGER'S LACTATE 30/500 ML
87.3 PLASTIC BAG, INJECTION (ML) INTRAVENOUS AS NEEDED
Status: DISCONTINUED | OUTPATIENT
Start: 2021-02-27 | End: 2021-03-01 | Stop reason: HOSPADM

## 2021-02-27 RX ORDER — SWAB
1 SWAB, NON-MEDICATED MISCELLANEOUS DAILY
Status: DISCONTINUED | OUTPATIENT
Start: 2021-02-27 | End: 2021-03-01 | Stop reason: HOSPADM

## 2021-02-27 RX ORDER — DIPHENHYDRAMINE HYDROCHLORIDE 50 MG/ML
12.5 INJECTION, SOLUTION INTRAMUSCULAR; INTRAVENOUS
Status: DISCONTINUED | OUTPATIENT
Start: 2021-02-27 | End: 2021-03-01 | Stop reason: HOSPADM

## 2021-02-27 RX ORDER — ACETAMINOPHEN 325 MG/1
650 TABLET ORAL
Status: DISCONTINUED | OUTPATIENT
Start: 2021-02-27 | End: 2021-03-01 | Stop reason: HOSPADM

## 2021-02-27 RX ORDER — HYDROCODONE BITARTRATE AND ACETAMINOPHEN 5; 325 MG/1; MG/1
1 TABLET ORAL
Status: DISCONTINUED | OUTPATIENT
Start: 2021-02-27 | End: 2021-03-01 | Stop reason: HOSPADM

## 2021-02-27 RX ADMIN — NIFEDIPINE 90 MG: 30 TABLET, FILM COATED, EXTENDED RELEASE ORAL at 11:10

## 2021-02-27 RX ADMIN — IBUPROFEN 800 MG: 800 TABLET, FILM COATED ORAL at 18:46

## 2021-02-27 RX ADMIN — LABETALOL HYDROCHLORIDE 300 MG: 100 TABLET, FILM COATED ORAL at 10:08

## 2021-02-27 RX ADMIN — SODIUM CHLORIDE, POTASSIUM CHLORIDE, SODIUM LACTATE AND CALCIUM CHLORIDE 125 ML/HR: 600; 310; 30; 20 INJECTION, SOLUTION INTRAVENOUS at 06:12

## 2021-02-27 RX ADMIN — HYDROCODONE BITARTRATE AND ACETAMINOPHEN 1 TABLET: 5; 325 TABLET ORAL at 06:09

## 2021-02-27 RX ADMIN — DOCUSATE SODIUM 100 MG: 100 CAPSULE, LIQUID FILLED ORAL at 18:45

## 2021-02-27 RX ADMIN — FAMOTIDINE 20 MG: 20 TABLET ORAL at 20:55

## 2021-02-27 RX ADMIN — CITALOPRAM HYDROBROMIDE 10 MG: 20 TABLET ORAL at 20:55

## 2021-02-27 RX ADMIN — HYDROCODONE BITARTRATE AND ACETAMINOPHEN 1 TABLET: 5; 325 TABLET ORAL at 16:06

## 2021-02-27 RX ADMIN — Medication 1 TABLET: at 10:07

## 2021-02-27 RX ADMIN — KETOROLAC TROMETHAMINE 30 MG: 30 INJECTION, SOLUTION INTRAMUSCULAR at 01:15

## 2021-02-27 RX ADMIN — IBUPROFEN 800 MG: 800 TABLET, FILM COATED ORAL at 10:08

## 2021-02-27 RX ADMIN — HYDROCODONE BITARTRATE AND ACETAMINOPHEN 1 TABLET: 5; 325 TABLET ORAL at 20:55

## 2021-02-27 RX ADMIN — DOCUSATE SODIUM 100 MG: 100 CAPSULE, LIQUID FILLED ORAL at 09:00

## 2021-02-27 RX ADMIN — SODIUM CHLORIDE 12.5 MG: 9 INJECTION, SOLUTION INTRAVENOUS at 00:01

## 2021-02-27 RX ADMIN — ENOXAPARIN SODIUM 40 MG: 100 INJECTION SUBCUTANEOUS at 13:36

## 2021-02-27 NOTE — PROGRESS NOTES
I have received SBAR from Dr. Francisco Franklin, have assumed care of the patient, and have introduced myself to the patient and her partner. The patient complains of pain during her contractions and denies pelvic and rectal pressure. Visit Vitals  BP (!) 149/72   Pulse 63   Temp 98.4 °F (36.9 °C)   Resp 16   Ht 5' 7\" (1.702 m)   Wt 105.2 kg (232 lb)   SpO2 100%   BMI 36.34 kg/m²         FSE: 130 moderate variability, accelerations present, no decelerations, cat 1  IUPC: contractions q 1-3 minutes, +  SVE: 4/60/-3 vtx, narrow pelvic outlet    Ass/Plan:  at 37 3/7 wks IOL for chronic hypertension and GDMA2, s/p cook catheter,  no cervical change,  adequate MVUs for over 3+ hours  Will recheck cervix in in 2 hours and if inadequate cervical change will proceed with delivery by . Plan of care d/w the patient.

## 2021-02-27 NOTE — PROGRESS NOTES
Post-Partum Day Number 1 Progress Note    Zhane Phillips   37 y.o. Information for the patient's :  Carlee Ward, Male Perri Apley [[id number]]   , Low Transverse      Patient doing well without significant complaint. Voiding without difficulty, normal lochia, and tolerates PO. Bottle feeding. Will start Lovenox. Will start Nifedipine 90mg XL after AM Labetalol dose is given. Vitals:  Visit Vitals  BP (!) 149/87   Pulse 80   Temp 98.6 °F (37 °C)   Resp 12   Ht 5' 7\" (1.702 m)   Wt 105.2 kg (232 lb)   SpO2 100%   Breastfeeding Unknown   BMI 36.34 kg/m²     Temp (24hrs), Av.4 °F (36.9 °C), Min:97.8 °F (36.6 °C), Max:98.6 °F (37 °C)    Exam:   Patient without distress. FF @ U-2 NT                LE NT w/o edema    Labs:     Lab Results   Component Value Date/Time    WBC 14.8 (H) 2021 05:08 PM    WBC 15.9 (H) 2021 12:41 PM    HGB 11.5 2021 05:08 PM    HGB 12.1 2021 12:41 PM    HCT 34.9 (L) 2021 05:08 PM    HCT 36.3 2021 12:41 PM    PLATELET 275  05:08 PM    PLATELET 797  12:41 PM     Recent Results (from the past 24 hour(s))   GLUCOSE, POC    Collection Time: 21 10:44 AM   Result Value Ref Range    Glucose (POC) 105 (H) 65 - 100 mg/dL    Performed by Valerie Bath    GLUCOSE, POC    Collection Time: 21  3:17 PM   Result Value Ref Range    Glucose (POC) 99 65 - 100 mg/dL    Performed by Valerie Bath    GLUCOSE, POC    Collection Time: 21  7:44 PM   Result Value Ref Range    Glucose (POC) 107 (H) 65 - 100 mg/dL    Performed by Melba Avalos      Assessment: PPD 1 s/p , stable. Plan:  1. Continue routine postpartum care  2. Start Lovenox 40mg SQ for DVT prophylaxis due to risk factors. 3. CHTN: Start Nifedipine 90mg XL and change Labetalol to 200mg po q8h prn elevated BPs >150/100.  4. Ambulate  5. GDM: No insulin post-delivery per Endocrine instructions.  The patient will f/u as indicated in 6-8 weeks.         Anna Hodge MD  2/27/2021

## 2021-02-27 NOTE — ANESTHESIA POSTPROCEDURE EVALUATION
Procedure(s):   SECTION. epidural    Anesthesia Post Evaluation      Multimodal analgesia: multimodal analgesia used between 6 hours prior to anesthesia start to PACU discharge  Patient location during evaluation: bedside  Patient participation: complete - patient participated  Level of consciousness: awake  Pain management: adequate  Airway patency: patent  Anesthetic complications: no  Cardiovascular status: acceptable  Respiratory status: acceptable  Hydration status: acceptable        INITIAL Post-op Vital signs:   Vitals Value Taken Time   /74 216   Temp 36.9 °C (98.4 °F) 21   Pulse 98 216   Resp 12 216   SpO2 100 % 218   Vitals shown include unvalidated device data.

## 2021-02-27 NOTE — PROGRESS NOTES
The patient is comfortable after having her epidural redosed. Visit Vitals  /82   Pulse 80   Temp 98.6 °F (37 °C)   Resp 16   Ht 5' 7\" (1.702 m)   Wt 105.2 kg (232 lb)   SpO2 98%   BMI 36.34 kg/m²     FHR: 130 moderate variability, intermittent variable and late decels  IUPC: contractions q 1-2 minutes, pitocin at 22 mu  SVE: 4/70/-3 vertex    Ass/Plan:  at 37 1/7 wks induction of labor for chronic htn and GDMA2, no cervical change in over 7 hour despite adequate MVUs, morbid obesity  Proceed with delivery by  for failed induction. Risks of  section discussed included bleeding with the possible need for blood products; injury to the broad ligament, bladder, ureters, and/or bowel; endometritis; wound infection; and thromboembolism. The patient voiced understanding and is accepting of these risks.

## 2021-02-27 NOTE — L&D DELIVERY NOTE
Delivery Summary    Patient: Zhane Phillips MRN: [id number]  SSN: [id number]    YOB: 1985  Age: 37 y.o. Sex: female        Information for the patient's :  Carlee Ward, Male Perri Apley [[id number]]       Labor Events:    Labor: No    Steroids: None   Cervical Ripening Date/Time:       Cervical Ripening Type: None   Antibiotics During Labor: Yes   Rupture Identifier:      Rupture Date/Time: 2021 11:19 AM   Rupture Type: AROM   Amniotic Fluid Volume: Moderate    Amniotic Fluid Description: Clear    Amniotic Fluid Odor: None    Induction: Medrano Bulb (balloon); Oxytocin;AROM       Induction Date/Time: 2021 4:45 PM    Indications for Induction: Diabetes; Chronic Hypertension    Augmentation: None   Augmentation Date/Time:      Indications for Augmentation:     Labor complications: Dysfunctional Labor; Failure to Progress in First Stage       Additional complications:        Delivery Events:  Indications For Episiotomy:     Episiotomy: None   Perineal Laceration(s): None   Repaired:     Periurethral Laceration Location:      Repaired:     Labial Laceration Location:     Repaired:     Sulcal Laceration Location:     Repaired:     Vaginal Laceration Location:     Repaired:     Cervical Laceration Location:     Repaired:     Repair Suture: None   Number of Repair Packets:     Estimated Blood Loss (ml):  ml   Quantitaive Blood Loss (ml):             Delivery Date: 2021    Delivery Time: 11:31 PM   Delivery Type: , Low Transverse     Details    Trial of Labor: Yes   Primary/Repeat: Primary   Priority: Routine   Indications:  Failure to Progress       Sex:  Male     Gestational Age: 37w1d  Delivery Clinician:  Kristi Corea  Living Status: Living   Delivery Location: L&D OR 2          APGARS  One minute Five minutes Ten minutes   Skin color:    1        Heart rate:    2        Grimace:            Muscle tone:            Breathing:             Totals: Presentation: Vertex    Position:        Resuscitation Method:  Suctioning-bulb; Tactile Stimulation     Meconium Stained: None      Cord Information: 3 Vessels  Complications: None  Cord around:    Delayed cord clamping? Yes  Cord clamped date/time:2021 11:32 PM  Disposition of Cord Blood: Discard    Blood Gases Sent?: No    Placenta:  Date/Time: 2021 11:34 PM  Removal: Expressed      Appearance: Normal     [place] Measurements:  Birth Weight: 2.8 kg      Birth Length: 45.7 cm      Head Circumference: 33 cm      Chest Circumference: 32 cm     Abdominal Girth: 32 cm    Other Providers:   KYLIE CARDENAS;EDGAR WOLFE;MAURICE GARNER;JASPER GIL;DAISY OWENS;JONES WRAY;NEIL GODFREY;MARIBEL DIALLO, Obstetrician;Primary Nurse;Primary [place] Nurse; Anesthesiologist;Charge Nurse;Surgeon Assistant;Surgeon Assistant;Scrub Tech             Group B Strep:   Lab Results   Component Value Date/Time    GrBStrep, External positive 2021     Information for the patient's :  Mahogany Mcneill, Male Preston Sanchez [[id number]]   No results found for: ABORH, PCTABR, PCTDIG, BILI, ABORHEXT, ABORH     No results for input(s): PCO2CB, PO2CB, HCO3I, SO2I, IBD, PTEMPI, SPECTI, PHICB, ISITE, IDEV, IALLEN in the last 72 hours. See operative note.

## 2021-02-27 NOTE — OP NOTES
[facility] 81  OPERATIVE REPORT    Name:  Edil Cummings  MR#:  [id number]  :  1985  ACCOUNT #:  [de-identified]  DATE OF SERVICE:  2021    PREOPERATIVE DIAGNOSES:  A 79-year-old  5, para 0-0-4-0 at 40 and 2/7 weeks' gestation with chronic hypertension, class A2 gestational diabetes, morbid obesity with failed labor induction. POSTOPERATIVE DIAGNOSES:  A 79-year-old  5, para 0-0-4-0 at 40 and 2/7 weeks' gestation with chronic hypertension, class A2 gestational diabetes, morbid obesity with failed labor induction, and term viable male infant. PROCEDURE PERFORMED:  Primary low segment transverse  section. SURGEON:  Marita Buitrago MD    ASSISTANT:  Maxine Dong    ANESTHESIA:  Epidural.    ANESTHESIOLOGIST:  Dr. Calhoun Orn:  None    SPECIMENS REMOVED:  Placenta    IMPLANTS: Seprafilm    QUANTITATIVE BLOOD LOSS:  700 ml.    IV FLUIDS:  1100 mL of crystalloid    URINE OUTPUT:  100 mL    FINDINGS:  A term viable male infant in occiput anterior presentation with Apgars 8 and 9, weighing 2800 g. The amniotic fluid was noted to be clear and the placenta was of grossly normal appearance. The patient's uterus and bilateral adnexa were of normal appearance. PROCEDURE:  The patient was taken to the operating room where she was placed in the supine position with a leftward tilt. The patient was prepped and draped in a sterile fashion. A surgical time-out was performed and the patient's identification and procedure were confirmed. An Emmalene Rocio test was performed and the patient was noted to have adequate anesthesia. A Pfannenstiel incision was made with the scalpel and carried down to the underlying layer of fascia using the Bovie. The Bovie was used to score the fascia in the midline and the fascial incision was extended laterally with the Bovie.   The anterior aspect of the fascial incision was grasped with Mariza Rout clamps and the underlying rectus muscles were dissected down using the Morales scissors and Bovie. The Kocher clamps were then placed on the inferior aspect of the fascial incision and the underlying rectus muscles were dissected down using Morales scissors. The peritoneum was carefully entered by blunt digital dissection and the peritoneal opening was extended superiorly and inferiorly with the Bovie. The peritoneal opening was manually stretched and the bladder blade was inserted. The vesicouterine peritoneum was tented with pickups and entered sharply with Metzenbaum scissors. The vesicouterine peritoneal opening was extended laterally with the Metzenbaums and the bladder flap was taken down digitally. The bladder blade was removed and reinserted. The scalpel was used to make a small horizontal incision in the midportion of the lower uterine segment until the uterine cavity was entered. The hysterotomy was digitally extended superiorly and inferiorly. The infant's head was elevated to the level of the uterine incision. Fundal pressure was applied and the infant's head and torso were delivered atraumatically. The infant's cord clamping was delayed for 30 seconds and the infant's nose and mouth were thoroughly bulb suctioned. The umbilical cord was clamped and cut and the infant was handed off to the waiting nurse. The placenta was manually expressed and the uterus was exteriorized and cleared of blood and remaining debris. The uterine incision was reapproximated with a running locked suture of 0 Monocryl and a second suture of 0 Monocryl was used to perform an imbricating stitch. There was good hemostasis seen along the uterine closure. The patient's posterior cul-de-sac was irrigated and suctioned. The uterus was placed back into the patient's pelvis and the patient's paracolic gutters were cleared of blood and clots.   The patient's pelvis was thoroughly irrigated and good hemostasis was seen throughout. Seprafilm was placed over the uterine incision and over the anterior uterine body. The peritoneum was reapproximated with 2-0 Vicryl in a running fashion and the rectus bellies were reapproximated with mattress sutures of 2-0 Vicryl. The fascia was reapproximated with 0 Vicryl in a running fashion. The subcutaneous tissue was irrigated and areas of bleeding in the subcutaneous layer were coagulated with the Bovie. The subcutaneous tissue was reapproximated with a running suture of 2-0 plain and the skin was reapproximated with a subcuticular stitch of 4-0 Monocryl. Laparotomy pad, needle and instrument counts were correct x2. The patient received vancomycin 1 g IV prior to surgery and gentamicin 400 mg intraoperatively. The patient was taken to the Labor and Delivery room in stable condition.       Bahman Brewster MD      [id number]  D:  02/27/2021 0:37  T:  02/27/2021 9:03  JOB #:  [id number]  CC:  Chicho Lane MD

## 2021-02-27 NOTE — PROGRESS NOTES
Nurse called to the room for swelling to left arm IV site after 15 mins of vancomycin administration. Left arm swollen, tender and cool to the touch. IV pump stopped and IV removed. Cold compress applied to site. This RN notified Dr. Manjeet Otero of IV infiltration and removal. Orders received to cancel order for Vancomycin.

## 2021-02-27 NOTE — LACTATION NOTE
This note was copied from a baby's chart. This is mother's first baby. Mother states baby breast fed after her  but has been sleepy since so she has been formula feeding. Mother discussed her marijuana usage - she has been smoking marijuana for PTSD and has not planned to stop since it has been helping her. Mother given literature to read from [address] - Medications and Mothers Milk (marijuana is an L4 - Limited data and possibly hazardous.) Mother counseled not to use marijuana if she plans to breastfeed her baby. Breast Assessment  Left Breast: Extra large  Left Nipple: Everted, Intact  Right Breast: Extra large  Right Nipple: Everted, Intact  Breast- Feeding Assessment  Attends Breast-Feeding Classes: No  Breast-Feeding Experience: No  Breast Trauma/Surgery: No  Type/Quality: Good(Mother states baby breast fed after delivery for 7 minutes. He has been sleepy during feeding times and has been formula feeding since.)     Mother/Infant Observation  Infant Observation: Frenulum checked(Tongue is short but can extend past lower gumline and baby's lower lip. Staff nurse aware.)    Instructed mother to call [facility] if she has any questions.

## 2021-02-27 NOTE — PROGRESS NOTES
1900- Verbal shift change report given to KISAHN Childers RN (oncoming nurse) by [place]. Jair Zapien RN (offgoing nurse). Report included the following information SBAR, Intake/Output, MAR and Recent Results. Floyd Cary with MD about status of pt. MD will be at bedside soon for SVE and to discuss Stephanie- MD given prenatal records to look over before coming to bedside. 2009- SVE per MD unchanged from check at 1500. Pt has had adequate contractions for going on 4 hours, will recheck in a few hours and    2015- Called  for redose. MD notified of POC and chance for Csection is cervical change is not made within the new few hours. 2025-  at bedside for redose. Verbal orders to increase rate to 12.     2030- Pt laying in supine position until pain is resolved and then will reposition pt to the right side with peanut ball    2043- This RN at bedside. Pt is throwing up and requesting zofran    2046- Pt given zofran at this time and turned to right side with peanut ball    2218- MD notified about pt's N/V, order for phenergan given    2227- MD at bedside for reassessment. Cervix unchanged from previous exam.    2228- C/S called at this time. Pt turned to left side and put in [place]    2229- MD consents with pt at this time    2230- Pt is shaved and stomach is wiped down with CHG wipes    2256- Pt leaves 210 with this RN at goes to OR    2258- Pt enters OR at this time    2300- Fetal heart tones obtained in the OR. .    2313- MD enters OR    2316- Procedure time out at this time    2331- Delivery of viable infant male at this time    2332- Cord is clamped and cut at this time. Infant handed to nursery at this time    0030- Pt leaves OR at this time  QBL- 46     0033- Pt enters recovery room. SCDs reapplied and assessment performed    0300- Pt transferred off of stretcher and to a regular bed.     0705- Verbal shift change report given to RAHUL Edgar RN (oncoming nurse) by [employer] RN (offgoing nurse). Report included the following information SBAR, Intake/Output, MAR and Recent Results.

## 2021-02-27 NOTE — PROGRESS NOTES
SBAR OUT Report: BABY    Verbal report given to Celio Mckee RN (full name and credentials) on this patient, being transferred to MIU (unit) for routine progression of care. Report consisted of Situation, Background, Assessment, and Recommendations (SBAR).  ID bands were compared with the identification form, and verified with the patient's mother and receiving nurse. Information from the SBAR, Kardex, Procedure Summary, Intake/Output, MAR, Recent Results and Med Rec Status and the Lisy Report was reviewed with the receiving nurse. According to the estimated gestational age scale, this infant is 41.2. BETA STREP:   The mother's Group Beta Strep (GBS) result was positive. She has received 3 dose(s) of vancomycin. Prenatal care was received by this patients mother. Opportunity for questions and clarification provided.

## 2021-02-28 LAB
BASOPHILS # BLD: 0 K/UL (ref 0–0.1)
BASOPHILS NFR BLD: 0 % (ref 0–1)
DIFFERENTIAL METHOD BLD: ABNORMAL
EOSINOPHIL # BLD: 0.2 K/UL (ref 0–0.4)
EOSINOPHIL NFR BLD: 1 % (ref 0–7)
ERYTHROCYTE [DISTWIDTH] IN BLOOD BY AUTOMATED COUNT: 14.5 % (ref 11.5–14.5)
HCT VFR BLD AUTO: 33.5 % (ref 35–47)
HGB BLD-MCNC: 10.8 G/DL (ref 11.5–16)
IMM GRANULOCYTES # BLD AUTO: 0.2 K/UL (ref 0–0.04)
IMM GRANULOCYTES NFR BLD AUTO: 1 % (ref 0–0.5)
LYMPHOCYTES # BLD: 1.2 K/UL (ref 0.8–3.5)
LYMPHOCYTES NFR BLD: 5 % (ref 12–49)
MCH RBC QN AUTO: 29.7 PG (ref 26–34)
MCHC RBC AUTO-ENTMCNC: 32.2 G/DL (ref 30–36.5)
MCV RBC AUTO: 92 FL (ref 80–99)
MONOCYTES # BLD: 1.2 K/UL (ref 0–1)
MONOCYTES NFR BLD: 5 % (ref 5–13)
NEUTS SEG # BLD: 21.2 K/UL (ref 1.8–8)
NEUTS SEG NFR BLD: 88 % (ref 32–75)
NRBC # BLD: 0 K/UL (ref 0–0.01)
NRBC BLD-RTO: 0 PER 100 WBC
PLATELET # BLD AUTO: 353 K/UL (ref 150–400)
PMV BLD AUTO: 9.4 FL (ref 8.9–12.9)
RBC # BLD AUTO: 3.64 M/UL (ref 3.8–5.2)
RBC MORPH BLD: ABNORMAL
WBC # BLD AUTO: 24 K/UL (ref 3.6–11)

## 2021-02-28 PROCEDURE — 85025 COMPLETE CBC W/AUTO DIFF WBC: CPT

## 2021-02-28 PROCEDURE — 2709999900 HC NON-CHARGEABLE SUPPLY

## 2021-02-28 PROCEDURE — 74011250636 HC RX REV CODE- 250/636: Performed by: OBSTETRICS & GYNECOLOGY

## 2021-02-28 PROCEDURE — 74011250637 HC RX REV CODE- 250/637: Performed by: OBSTETRICS & GYNECOLOGY

## 2021-02-28 PROCEDURE — 65270000029 HC RM PRIVATE

## 2021-02-28 PROCEDURE — 36415 COLL VENOUS BLD VENIPUNCTURE: CPT

## 2021-02-28 RX ORDER — HYDROCODONE BITARTRATE AND ACETAMINOPHEN 5; 325 MG/1; MG/1
2 TABLET ORAL
Status: DISCONTINUED | OUTPATIENT
Start: 2021-02-28 | End: 2021-03-01 | Stop reason: HOSPADM

## 2021-02-28 RX ADMIN — HYDROCODONE BITARTRATE AND ACETAMINOPHEN 1 TABLET: 5; 325 TABLET ORAL at 18:03

## 2021-02-28 RX ADMIN — HYDROCODONE BITARTRATE AND ACETAMINOPHEN 1 TABLET: 5; 325 TABLET ORAL at 18:39

## 2021-02-28 RX ADMIN — NIFEDIPINE 90 MG: 30 TABLET, FILM COATED, EXTENDED RELEASE ORAL at 10:41

## 2021-02-28 RX ADMIN — Medication 1 TABLET: at 10:41

## 2021-02-28 RX ADMIN — DOCUSATE SODIUM 100 MG: 100 CAPSULE, LIQUID FILLED ORAL at 18:03

## 2021-02-28 RX ADMIN — IBUPROFEN 800 MG: 800 TABLET, FILM COATED ORAL at 01:34

## 2021-02-28 RX ADMIN — ENOXAPARIN SODIUM 40 MG: 100 INJECTION SUBCUTANEOUS at 10:42

## 2021-02-28 RX ADMIN — IBUPROFEN 800 MG: 800 TABLET, FILM COATED ORAL at 07:54

## 2021-02-28 RX ADMIN — CITALOPRAM HYDROBROMIDE 10 MG: 20 TABLET ORAL at 20:31

## 2021-02-28 RX ADMIN — HYDROCODONE BITARTRATE AND ACETAMINOPHEN 2 TABLET: 5; 325 TABLET ORAL at 22:37

## 2021-02-28 RX ADMIN — SIMETHICONE 80 MG: 80 TABLET, CHEWABLE ORAL at 20:31

## 2021-02-28 RX ADMIN — HYDROCODONE BITARTRATE AND ACETAMINOPHEN 1 TABLET: 5; 325 TABLET ORAL at 00:44

## 2021-02-28 RX ADMIN — HYDROCODONE BITARTRATE AND ACETAMINOPHEN 1 TABLET: 5; 325 TABLET ORAL at 04:33

## 2021-02-28 RX ADMIN — SIMETHICONE 80 MG: 80 TABLET, CHEWABLE ORAL at 04:33

## 2021-02-28 RX ADMIN — SIMETHICONE 80 MG: 80 TABLET, CHEWABLE ORAL at 01:38

## 2021-02-28 RX ADMIN — FAMOTIDINE 20 MG: 20 TABLET ORAL at 20:31

## 2021-02-28 RX ADMIN — HYDROCODONE BITARTRATE AND ACETAMINOPHEN 1 TABLET: 5; 325 TABLET ORAL at 07:54

## 2021-02-28 RX ADMIN — DOCUSATE SODIUM 100 MG: 100 CAPSULE, LIQUID FILLED ORAL at 10:41

## 2021-02-28 RX ADMIN — IBUPROFEN 800 MG: 800 TABLET, FILM COATED ORAL at 16:48

## 2021-02-28 RX ADMIN — HYDROCODONE BITARTRATE AND ACETAMINOPHEN 1 TABLET: 5; 325 TABLET ORAL at 13:45

## 2021-02-28 NOTE — PROGRESS NOTES
Patient appears agitated during morning assessment. Patient states she is tired, FOB is sleeping, and their infant on phototherapy keeps crying. Infant taken to nursery so family can rest. Patient states she will call when ready for infant to come back to room.

## 2021-02-28 NOTE — PROGRESS NOTES
1552- Patient has a mews of 3 with heart rate of 125 per tech's afternoon vitals. Apical pulse obtained by nurse and noted to be 120. Patient has no complaints. Per patient NO dizziness, lightheadedness, headache. Spoke to Dr Mackenzie Sanchez currently covering for [facility]. Per Dr Mackenzie Sanchez, recheck vitals in 1 hour and include pulse ox. 1730- Current heart rate 120. Infant in crib crying. Patient now feeding infant. Will recheck vitals in 1 hour. 1818-Spoke to Dr Mackenzie Sanchez and notified of patient heart rate 115-120. Patient complaint of pain remaining at 8/10. Orders given to increase norco dose to 1-2 tabs q4 hrs prn. See md orders. Will continue to monitor.

## 2021-02-28 NOTE — ROUTINE PROCESS
Bedside and Verbal shift change report given to Jairo Calix RN  (oncoming nurse) by Rober Barriga RN (offgoing nurse). Report included the following information SBAR, Kardex, Intake/Output, MAR and Recent Results.

## 2021-02-28 NOTE — PROGRESS NOTES
Post-Operative  Day 2    [facility] for the patient's :  Prateek Sloan, Male Philip Castleman [[id number]]   , Low Transverse     Patient doing well but with more incisional pain today. She is voiding without difficulty, normal lochia, and tolerates PO. Bottle feeding. The baby needs to stay until POD#3 tomorrow for bilirubin lights. BPs 110-130s/60-80s on Nifedipine 90mg XL. Labetalol only for prn use at this time. Afebrile. Vitals:  Visit Vitals  /82 (BP 1 Location: Right arm)   Pulse (!) 112 Comment: adv rn   Temp 98.3 °F (36.8 °C)   Resp 18   Ht 5' 7\" (1.702 m)   Wt 105.2 kg (232 lb)   SpO2 98%   Breastfeeding Unknown   BMI 36.34 kg/m²     Temp (24hrs), Av.4 °F (36.9 °C), Min:97.9 °F (36.6 °C), Max:98.8 °F (37.1 °C)    Exam:      Patient without distress. Abdomen: soft, expected tenderness, fundus firm                Incision clean, dry and intact; RENE dressing in place               Lower extremities are nontender without edema.     Labs:   Lab Results   Component Value Date/Time    WBC 24.0 (H) 2021 04:16 AM    WBC 14.8 (H) 2021 05:08 PM    WBC 15.9 (H) 2021 12:41 PM    HGB 10.8 (L) 2021 04:16 AM    HGB 11.5 2021 05:08 PM    HGB 12.1 2021 12:41 PM    HCT 33.5 (L) 2021 04:16 AM    HCT 34.9 (L) 2021 05:08 PM    HCT 36.3 2021 12:41 PM    PLATELET 918 2988 04:16 AM    PLATELET 659  05:08 PM    PLATELET 751  12:41 PM       Recent Results (from the past 24 hour(s))   CBC WITH AUTOMATED DIFF    Collection Time: 21  4:16 AM   Result Value Ref Range    WBC 24.0 (H) 3.6 - 11.0 K/uL    RBC 3.64 (L) 3.80 - 5.20 M/uL    HGB 10.8 (L) 11.5 - 16.0 g/dL    HCT 33.5 (L) 35.0 - 47.0 %    MCV 92.0 80.0 - 99.0 FL    MCH 29.7 26.0 - 34.0 PG    MCHC 32.2 30.0 - 36.5 g/dL    RDW 14.5 11.5 - 14.5 %    PLATELET 818 292 - 369 K/uL    MPV 9.4 8.9 - 12.9 FL    NRBC 0.0 0  WBC ABSOLUTE NRBC 0.00 0.00 - 0.01 K/uL    NEUTROPHILS 88 (H) 32 - 75 %    LYMPHOCYTES 5 (L) 12 - 49 %    MONOCYTES 5 5 - 13 %    EOSINOPHILS 1 0 - 7 %    BASOPHILS 0 0 - 1 %    IMMATURE GRANULOCYTES 1 (H) 0.0 - 0.5 %    ABS. NEUTROPHILS 21.2 (H) 1.8 - 8.0 K/UL    ABS. LYMPHOCYTES 1.2 0.8 - 3.5 K/UL    ABS. MONOCYTES 1.2 (H) 0.0 - 1.0 K/UL    ABS. EOSINOPHILS 0.2 0.0 - 0.4 K/UL    ABS. BASOPHILS 0.0 0.0 - 0.1 K/UL    ABS. IMM. GRANS. 0.2 (H) 0.00 - 0.04 K/UL    DF SMEAR SCANNED      RBC COMMENTS NORMOCYTIC, NORMOCHROMIC       Assessment: Post-Op day 2, doing well; A+/RI/XY \"Bao\"    Plan:   1. Continue routine postpartum care. Encourage more ambulation outside of the room today. 2. Lovenox 40mg SQ for DVT prophylaxis due to risk factors. 3. CHTN: BPs 110-130s/60-80s on Nifedipine 90mg XL. Labetalol 200mg po q8h prn for elevated BPs >150/100.  4. Circ done today per parent request.  5. GDM: No insulin post-delivery per Endocrine instructions. The patient will f/u as indicated in 6-8 weeks.

## 2021-03-01 VITALS
RESPIRATION RATE: 18 BRPM | WEIGHT: 232 LBS | SYSTOLIC BLOOD PRESSURE: 129 MMHG | HEART RATE: 112 BPM | DIASTOLIC BLOOD PRESSURE: 79 MMHG | OXYGEN SATURATION: 98 % | TEMPERATURE: 98.2 F | HEIGHT: 67 IN | BODY MASS INDEX: 36.41 KG/M2

## 2021-03-01 PROCEDURE — 74011250637 HC RX REV CODE- 250/637: Performed by: OBSTETRICS & GYNECOLOGY

## 2021-03-01 PROCEDURE — 74011250636 HC RX REV CODE- 250/636: Performed by: OBSTETRICS & GYNECOLOGY

## 2021-03-01 RX ORDER — ENOXAPARIN SODIUM 100 MG/ML
40 INJECTION SUBCUTANEOUS EVERY 24 HOURS
Qty: 40 SYRINGE | Refills: 0 | Status: SHIPPED | OUTPATIENT
Start: 2021-03-01 | End: 2021-03-12

## 2021-03-01 RX ORDER — HYDROCODONE BITARTRATE AND ACETAMINOPHEN 5; 325 MG/1; MG/1
1 TABLET ORAL
Qty: 30 TAB | Refills: 0 | Status: SHIPPED | OUTPATIENT
Start: 2021-03-01 | End: 2021-03-12

## 2021-03-01 RX ADMIN — HYDROCODONE BITARTRATE AND ACETAMINOPHEN 2 TABLET: 5; 325 TABLET ORAL at 10:49

## 2021-03-01 RX ADMIN — IBUPROFEN 800 MG: 800 TABLET, FILM COATED ORAL at 01:04

## 2021-03-01 RX ADMIN — IBUPROFEN 800 MG: 800 TABLET, FILM COATED ORAL at 09:30

## 2021-03-01 RX ADMIN — DOCUSATE SODIUM 100 MG: 100 CAPSULE, LIQUID FILLED ORAL at 09:30

## 2021-03-01 RX ADMIN — ENOXAPARIN SODIUM 40 MG: 100 INJECTION SUBCUTANEOUS at 09:31

## 2021-03-01 RX ADMIN — HYDROCODONE BITARTRATE AND ACETAMINOPHEN 2 TABLET: 5; 325 TABLET ORAL at 02:29

## 2021-03-01 RX ADMIN — SIMETHICONE 80 MG: 80 TABLET, CHEWABLE ORAL at 06:49

## 2021-03-01 RX ADMIN — HYDROCODONE BITARTRATE AND ACETAMINOPHEN 2 TABLET: 5; 325 TABLET ORAL at 06:49

## 2021-03-01 RX ADMIN — Medication 1 TABLET: at 09:30

## 2021-03-01 RX ADMIN — NIFEDIPINE 90 MG: 30 TABLET, FILM COATED, EXTENDED RELEASE ORAL at 09:30

## 2021-03-01 NOTE — PROGRESS NOTES
03/01/21 9:29 AM  YEIMY met with LINA to complete initial assessment, to begin discharge planning, and to address concerns regarding THC use. Demographics were reviewed and confirmed; LINA lives with her mother, Angela Maddox ([phone]), at the address listed in [place], [place]. 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 FOB is Mel Maddox ([phone]) and he is a  for [employer]; he will not take any time away from work. He lives close and plans to be involved in the daily care of the baby. LINA is bottlefeeding formula and has [facility] services. She also has Medicaid services. She plans to call the benefits office to have baby added to both benefits when she gets home. Patient has car seat, crib, clothing, and other necessary supplies. [facility]  will provide medical follow up for the baby; an appointment is scheduled for Tuesday 3/2 at 1PM.    LINA's EPDS score was 18; she answered \"never\" to the self harm question per report from the RN. LINA already takes Celexa and feels well maintained on this medication. She has the medication at home and will not need a new prescription. She fills her medications and [facility] in [place], [place] and has prescription coverage through her health insurance. A 1 week postpartum appointment was scheduled for LINA for a mood check and BP check for Monday 3/8 at 10AM.  A 5-6 week follow up was also scheduled for Monday 4/5 at [place] [id number].  Both appointments are on the [facility].    LINA noted that she uses \"medical marijuana\" daily for PTSD. YEIMY was able to clarify with LINA that she is not registered through [place]'s medical cannibis program and does not have a medical marijuana card. LINA stated that she uses marijuana to treat a medical issue. She stated that because marijuana use is contraindicated with breastfeeding, she has chosen to formula feed the baby.     [place] CPS contacted due to baby and LINA's positive drug screen for THC. No report taken due to the baby not experiencing any withdrawal symptoms at this time.   Care Management Interventions  PCP Verified by CM: Yes([address])  Mode of Transport at Discharge: Self  Transition of Care Consult (CM Consult): Discharge Planning, Other(MOB and baby positive for [facility])  Current Support Network: Relative's Home, Family Lives Nearby(Lives with mother)  Confirm Follow Up Transport: Family(Mother)  Discharge Location  Discharge Placement: Home with outpatient services  RIC Wu

## 2021-03-01 NOTE — ROUTINE PROCESS
Bedside and Verbal shift change report given to Colletta Hare (oncoming nurse) by Sara Mary. Wen Garibay (offgoing nurse). Report given with SBAR, Kardex, Intake/Output and MAR.

## 2021-03-01 NOTE — ROUTINE PROCESS
Bedside and Verbal shift change report given to Summer Barraza RN  (oncoming nurse) by Elke Manzo RN (offgoing nurse). Report included the following information SBAR, Kardex, Intake/Output, MAR and Recent Results.

## 2021-03-01 NOTE — PROGRESS NOTES
0600 Patient would like to try Percoet instead of Norco for next dose at 0630. RN will call MD to change. [id number]: RN called hospitalist call room, no answer then labor to ask if Dr. Rosanna Leyden is available, MD in a patient room, will have MD call to unit per labor nurse. 5: Dr Rosanna Leyden on unit, patient has changed mind and would like to stick with Darl Dark, since it is working, just wants to make sure she will have enough to go home, explained a prescription would be given prior to discharge.

## 2021-03-01 NOTE — PROGRESS NOTES
1120: Pt off unit in stable condition via wheelchair with volunteers for discharge home per Dr. Joaquin Cobb. Pt is aware to follow up in 1 week, appt scheduled for 3/8/2021 at 1000 with Dr. Reinaldo Pablo for a BP check and mood check. 6 week postpartum visit scheduled for 4/5/2021 at 1000 with Dr. Reinaldo Pablo. Prescriptions sent to pt's pharmacy by MD. Pt denies any HA, dizziness, N/V, or pain at this time. Infant in car seat and discharged with mother. Postpartum education teaching completed by this RN. Pt verbalizes understanding. Discharge papers signed. Perineal supplies given to pt.

## 2021-03-01 NOTE — DISCHARGE INSTRUCTIONS
Patient Education   remove incision dressing after one week. Sooner if prefers. Call office if BP >160/110        Section: What to Expect at Home  Your Recovery     A  section, or , is surgery to deliver your baby through a cut that the doctor makes in your lower belly and uterus. The cut is called an incision. You may have some pain in your lower belly and need pain medicine for 1 to 2 weeks. You can expect some vaginal bleeding for several weeks. You will probably need about 6 weeks to fully recover. It's important to take it easy while the incision heals. Avoid heavy lifting, strenuous activities, and exercises that strain the belly muscles while you recover. Ask a family member or friend for help with housework, cooking, and shopping. This care sheet gives you a general idea about how long it will take for you to recover. But each person recovers at a different pace. Follow the steps below to get better as quickly as possible. How can you care for yourself at home? Activity    · Rest when you feel tired. Getting enough sleep will help you recover.     · Try to walk each day. Start by walking a little more than you did the day before. Bit by bit, increase the amount you walk. Walking boosts blood flow and helps prevent pneumonia, constipation, and blood clots.     · Avoid strenuous activities, such as bicycle riding, jogging, weightlifting, and aerobic exercise, for 6 weeks or until your doctor says it is okay.     · Until your doctor says it is okay, do not lift anything heavier than your baby.     · Do not do sit-ups or other exercises that strain the belly muscles for 6 weeks or until your doctor says it is okay.     · Hold a pillow over your incision when you cough or take deep breaths. This will support your belly and decrease your pain.     · You may shower as usual. Pat the incision dry when you are done.     · You will have some vaginal bleeding. Wear sanitary pads.  Do not douche or use tampons until your doctor says it is okay.     · Ask your doctor when you can drive again.     · You will probably need to take at least 6 weeks off work. It depends on the type of work you do and how you feel.     · Ask your doctor when it is okay for you to have sex. Diet    · You can eat your normal diet. If your stomach is upset, try bland, low-fat foods like plain rice, broiled chicken, toast, and yogurt.     · Drink plenty of fluids (unless your doctor tells you not to).     · You may notice that your bowel movements are not regular right after your surgery. This is common. Try to avoid constipation and straining with bowel movements. You may want to take a fiber supplement every day. If you have not had a bowel movement after a couple of days, ask your doctor about taking a mild laxative.     · If you are breastfeeding, limit alcohol. Alcohol can cause a lack of energy and other health problems for the baby when a breastfeeding woman drinks heavily. It can also get in the way of a mom's ability to feed her baby or to care for the child in other ways. There isn't a lot of research about exactly how much alcohol can harm a baby. Having no alcohol is the safest choice for your baby. If you choose to have a drink now and then, have only one drink, and limit the number of occasions that you have a drink. Wait to breastfeed at least 2 hours after you have a drink to reduce the amount of alcohol the baby may get in the milk. Medicines    · Your doctor will tell you if and when you can restart your medicines. He or she will also give you instructions about taking any new medicines.     · If you take aspirin or some other blood thinner, ask your doctor if and when to start taking it again. Make sure that you understand exactly what your doctor wants you to do.     · Take pain medicines exactly as directed. ? If the doctor gave you a prescription medicine for pain, take it as prescribed.   ? If you are not taking a prescription pain medicine, ask your doctor if you can take an over-the-counter medicine.     · If you think your pain medicine is making you sick to your stomach:  ? Take your medicine after meals (unless your doctor has told you not to). ? Ask your doctor for a different pain medicine.     · If your doctor prescribed antibiotics, take them as directed. Do not stop taking them just because you feel better. You need to take the full course of antibiotics. Incision care    · If you have strips of tape on the incision, leave the tape on for a week or until it falls off.     · Wash the area daily with warm, soapy water, and pat it dry. Don't use hydrogen peroxide or alcohol, which can slow healing. You may cover the area with a gauze bandage if it weeps or rubs against clothing. Change the bandage every day.     · Keep the area clean and dry. Other instructions    · If you breastfeed your baby, you may be more comfortable while you are healing if you place the baby so that he or she is not resting on your belly. Try tucking your baby under your arm, with his or her body along the side you will be feeding on. Support your baby's upper body with your arm. With that hand you can control your baby's head to bring his or her mouth to your breast. This is sometimes called the football hold. Follow-up care is a key part of your treatment and safety. Be sure to make and go to all appointments, and call your doctor if you are having problems. It's also a good idea to know your test results and keep a list of the medicines you take. When should you call for help? Call  911 anytime you think you may need emergency care. For example, call if:    · You have thoughts of harming yourself, your baby, or another person.     · You passed out (lost consciousness).     · You have chest pain, are short of breath, or cough up blood.     · You have a seizure.    Call your doctor now or seek immediate medical care if:    · You have pain that does not get better after you take pain medicine.     · You have severe vaginal bleeding.     · You are dizzy or lightheaded, or you feel like you may faint.     · You have new or worse pain in your belly or pelvis.     · You have loose stitches, or your incision comes open.     · You have symptoms of infection, such as:  ? Increased pain, swelling, warmth, or redness. ? Red streaks leading from the incision. ? Pus draining from the incision. ? A fever.     · You have symptoms of a blood clot in your leg (called a deep vein thrombosis), such as:  ? Pain in your calf, back of the knee, thigh, or groin. ? Redness and swelling in your leg or groin.     · You have signs of preeclampsia, such as:  ? Sudden swelling of your face, hands, or feet. ? New vision problems (such as dimness, blurring, or seeing spots). ? A severe headache. Watch closely for changes in your health, and be sure to contact your doctor if:    · You do not get better as expected. Where can you learn more? Go to http://www.[facility].com/  Enter M806 in the search box to learn more about \" Section: What to Expect at Home. \"  Current as of: 2020               Content Version: 12.6  © 3181-6730 [employer]. Care instructions adapted under license by [employer] (which disclaims liability or warranty for this information). If you have questions about a medical condition or this instruction, always ask your healthcare professional. [place] [address] any warranty or liability for your use of this information.     POSTPARTUM DISCHARGE INSTRUCTIONS       Name:  Erna Geiger  YOB: 1985  Admission Diagnosis:  Term pregnancy [Z34.90]     Discharge Diagnosis:    Problem List as of 3/1/2021 Date Reviewed: 2021          Codes Class Noted - Resolved    Term pregnancy ICD-10-CM: Z34.90  ICD-9-CM: V22.1  2021 - Present        Pregnancy ICD-10-CM: Z34.90  ICD-9-CM: V22.2  2021 - Present        Gestational diabetes mellitus (GDM) in childbirth, insulin controlled ICD-10-CM: O24.424  ICD-9-CM: 648.81, V58.67  2021 - Present        Depression ICD-10-CM: F32.9  ICD-9-CM: 829  2021 - Present        Hypertension affecting pregnancy, third trimester ICD-10-CM: O16.3  ICD-9-CM: 642.93  2021 - Present            Attending Physician:  Esther Holbrook MD    Delivery Type:   Section: What to Expect at Home    Your Recovery:  A  section, or , is surgery to deliver your baby through a cut, called an incision that the doctor makes in your lower belly and uterus. You may have some pain in your lower belly and need pain medicine for 1 to 2 weeks. You can expect some vaginal bleeding for several weeks. You will probably need about 6 weeks to fully recover. It is important to take it easy while the incision is healing. Avoid heavy lifting, strenuous activities, or exercises that strain the belly muscles while you are recovering. Ask a family member or friend for help with housework, cooking, and shopping. This care sheet gives you a general idea about how long it will take for you to recover. But each person recovers at a different pace. Follow the steps below to get better as quickly as possible. How can you care for yourself at home? Activity  · Rest when you feel tired. Getting enough sleep will help you recover. · Try to walk each day. Start by walking a little more than you did the day before. Bit by bit, increase the amount you walk. Walking boosts blood flow and helps prevent pneumonia, constipation, and blood clots. · Avoid strenuous activities, such as bicycle riding, jogging, weightlifting, and aerobic exercise, for 6 weeks or until your doctor says it is okay. · Until your doctor says it is okay, do not lift anything heavier than your baby.   · Do not do sit-ups or other exercise that strain the belly muscles for 6 weeks or until your doctor says it is okay. · Hold a pillow over your incision when you cough or take deep breaths. This will support your belly and decrease your pain. · You may shower as usual. Pat the incision dry when you are done. · You will have some vaginal bleeding. Wear sanitary pads. Do not douche or use tampons until your doctor says it is okay. · Ask your doctor when you can drive again. · You will probably need to take at least 6 weeks off work. It depends on the type of work you do and how you feel. · Wait until you are healed (about 4 to 6 weeks) before you have sexual intercourse. Your doctor will tell you when it is okay to have sex. · Talk to your doctor about birth control. You can get pregnant even before your period returns. Also, you can get pregnant while you are breast-feeding. Incision care  Your skin is your body's first line of defense against germs, but an incision site leaves an easy way for germs to enter your body. To prevent infection:  · Clean your hands frequently and before and after changing any touching any dressings. · If you have strips of tape on the incision, leave the tape on for a week or until it falls off. · Look at your incision closely every day for any changes. Contact your doctor if you experience any signs of infection, such as fever or increased redness at the surgical site. · Wash the area daily with warm, soapy water, and pat it dry. Don't use hydrogen peroxide or alcohol, which can slow healing. You may cover the area with a gauze bandage if it weeps or rubs against clothing. Change the bandage every day. · Keep the area clean and dry. Diet  · You can eat your normal diet. If your stomach is upset, try bland, low-fat foods like plain rice, broiled chicken, toast, and yogurt. · Drink plenty of fluids (unless your doctor tells you not to).   · You may notice that your bowel movements are not regular right after your surgery. This is common. Try to avoid constipation and straining with bowel movements. You may want to take a fiber supplement every day. If you have not had a bowel movement after a couple of days, ask your doctor about taking a mild laxative. · If you are breast-feeding, do not drink any alcohol. Medicines  · Take pain medicines exactly as directed. · If the doctor gave you a prescription medicine for pain, take it as prescribed. · If you are not taking a prescription pain medicine, ask your doctor if you can take an over-the-counter medicine such as acetaminophen (Tylenol), ibuprofen (Advil, Motrin), or naproxen (Aleve), for cramps. Read and follow all instructions on the label. Do not take aspirin, because it can cause more bleeding. Do not take acetaminophen (Tylenol) and other acetaminophen containing medications (i.e. Percocet) at the same time. · If you think your pain medicine is making you sick to your stomach:  · Take your medicine after meals (unless your doctor has told you not to). · Ask your doctor for a different pain medicine. · If your doctor prescribed antibiotics, take them as directed. Do not stop taking them just because you feel better. You need to take the full course of antibiotics. Mental Health  · Many women get the \"baby blues\" during the first few days after childbirth. You may lose sleep, feel irritable, and cry easily. You may feel happy one minute and sad the next. Hormone changes are one cause of these emotional changes. Also, the demands of a new baby, along with visits from relatives or other family needs, add to a mother's stress. The \"baby blues\" often peak around the fourth day. Then they ease up in less than 2 weeks. · If your moodiness or anxiety lasts for more than 2 weeks, or if you feel like life is not worth living, you may have postpartum depression. This is different for each mother.  Some mothers with serious depression may worry intensely about their infant's well-being. Others may feel distant from their child. Some mothers might even feel that they might harm their baby. A mother may have signs of paranoia, wondering if someone is watching her. · With all the changes in your life, you may not know if you are depressed. Pregnancy sometimes causes changes in how you feel that are similar to the symptoms of depression. · Symptoms of depression include:  · Feeling sad or hopeless and losing interest in daily activities. These are the most common symptoms of depression. · Sleeping too much or not enough. · Feeling tired. You may feel as if you have no energy. · Eating too much or too little. · [employer] ([employer]) offers a Warm line; Chat with the Expert phone sessions; Information and Articles about Pregnancy and Postpartum Mood Disorders; Comprehensive List of Free Support Groups; Knowledgeable local coordinators who will offer support, information, and resources; Guide to Resources on [employer]; Calendar of events in the  mood disorders community; Latest News and Research; and [facility] & [facility] [address] for [employer]. Remember - You are not alone; You are not to blame; With help, you will be well. 3-264-851-PPD(0841). WWW. POSTPARTUM. NET   · Writing or talking about death, such as writing suicide notes or talking about guns, knives, or pills. Keep the numbers for these national suicide hotlines: 4-787-703-TALK (8-871-936-778.865.1606) and 1-655-ETHOWJZ (3-953.975.1906). If you or someone you know talks about suicide or feeling hopeless, get help right away. Other instructions  · If you breast-feed your baby, you may be more comfortable while you are healing if you place the baby so that he or she is not resting on your belly. Try tucking your baby under your arm, with his or her body along the side you will be feeding on. Support your baby's upper body with your arm.  With that hand you can control your baby's head to bring his or her mouth to your breast. This is sometimes called the football hold. Follow-up care is a key part of your treatment and safety. Be sure to make and go to all appointments, and call your doctor if you are having problems. It's also a good idea to know your test results and keep a list of the medicines you take. When should you call for help? Call 911 anytime you think you may need emergency care. For example, call if:  · You are thinking of hurting yourself, your baby, or anyone else. · You passed out (lost consciousness). · You have symptoms of a blood clot in your lung (called a pulmonary embolism). These may include:  · Sudden chest pain. · Trouble breathing. · Coughing up blood. Call your doctor now or seek immediate medical care if:    · You have severe vaginal bleeding. · You are soaking through a pad each hour for 2 or more hours. · Your vaginal bleeding seems to be getting heavier or is still bright red 4 days after delivery. · You are dizzy or lightheaded, or you feel like you may faint. · You are vomiting or cannot keep fluids down. · You have a fever. · You have new or more belly pain. · You have loose stitches, or your incision comes open. · You have symptoms of infection, such as:  · Increased pain, swelling, warmth, or redness. · Red streaks leading from the incision. · Pus draining from the incision. · A fever  · You pass tissue (not just blood). · Your vaginal discharge smells bad. · Your belly feels tender or full and hard. · Your breasts are continuously painful or red. · You feel sad, anxious, or hopeless for more than a few days. · You have sudden, severe pain in your belly. · You have symptoms of a blood clot in your leg (called a deep vein thrombosis), such as:  · Pain in your calf, back of the knee, thigh, or groin. · Redness and swelling in your leg or groin.   · You have symptoms of preeclampsia, such as:  · Sudden swelling of your face, hands, or feet. · New vision problems (such as dimness or blurring). · A severe headache. · Your blood pressure is higher than it should be or rises suddenly. · You have new nausea or vomiting. Watch closely for changes in your health, and be sure to contact your doctor if you have any problems. Additional Information:  Learning About Hypertensive Disorders After Childbirth    What is preeclampsia? A woman with preeclampsia has blood pressure that is higher than usual. She may also have other serious symptoms. Preeclampsia can be dangerous. When it is severe, it can cause seizures (eclampsia) or liver or kidney damage. When the liver is affected, some women get HELLP syndrome, a blood-clotting and bleeding problem. HELLP can come on quickly and can be deadly. This is why your doctor checks you and your baby often. Preeclampsia usually occurs after 20 weeks of pregnancy. In rare cases, it is first noted right after childbirth. Most often, it starts near the end of pregnancy and goes away after childbirth. What are the symptoms? Mild preeclampsia usually doesn't cause symptoms. But preeclampsia can cause rapid weight gain and sudden swelling of the hands and face. Severe preeclampsia does cause symptoms. It can cause a very bad headache and trouble seeing and breathing. It also can cause belly pain. You may also urinate less than usual.    If you have new preeclampsia symptoms after you go home from the hospital, call your doctor right away. What can you expect after you have had preeclampsia? In the hospital  After the baby and the placenta are delivered, preeclampsia usually starts to improve. Most women get better in the first few days after childbirth. After having preeclampsia, you still have a risk of seizures for a day or more after childbirth.  (Very rarely, seizures happen later on.) So your doctor may have you take magnesium sulfate for a day or more to prevent seizures. You may also take medicine to lower your blood pressure. When you go home  Your blood pressure will most likely return to normal a few days after delivery. Your doctor will want to check your blood pressure sometime in the first week after you leave the hospital.    Some women still have high blood pressure 6 weeks after childbirth. But most return to normal levels over the long term. · Take and record your blood pressure at home if your doctor tells you to. · Learn the importance of the two measures of blood pressure (such as 120 over 80, or 120/80). The first number is the systolic pressure. This is the force of blood on the artery walls as the heart pumps. The second number is the diastolic pressure. This is the force of blood on the artery walls between heartbeats, when the heart is at rest. You have a choice of monitors to use. Manual monitor: You pump up the cuff and use a stethoscope to listen for your  Pulse. · Electronic monitor: The cuff inflates, and a gauge shows your pulse rate. · To take your blood pressure:  · Ask your doctor to check your blood pressure monitor to be sure that it is accurate and that the cuff fits you. Also ask your doctor to watch you use it, to make sure that you are using it right. · You should not eat, use tobacco products, or use medicine known to raise blood pressure (such as some nasal decongestant sprays) before you take your blood pressure. · Avoid taking your blood pressure if you have just exercised or are nervous or upset. Rest at least 15 minutes before you take your blood pressure. · Be safe with medicines. If you take medicine, take it exactly as prescribed. Call your doctor if you think you are having a problem with your medicine. · Do not smoke. Quitting smoking will help lower your blood pressure and improve your baby's growth and health. If you need help quitting, talk to your doctor about stop-smoking programs and medicines.  These can increase your chances of quitting for good. · Eat a balanced and healthy diet that has lots of fruits and vegetables. Long-term health   After you have had preeclampsia, you have a higher-than-average risk of heart disease, stroke, and kidney disease. This may be because the same things that cause preeclampsia also cause heart and kidney disease. To protect your health, work with your doctor on living a heart-healthy lifestyle and getting the checkups you need. Your doctor may also want you to check your blood pressure at home. Follow-up care is a key part of your treatment and safety. Be sure to make and go to all appointments, and call your doctor if you are having problems. It's also a good idea to know your test results and keep a list of the medicines you take. Gestational Diabetes After Childbirth     Most of the time, gestational diabetes goes away after a baby is born. But if you have had gestational diabetes, you have a greater chance of having it in a future pregnancy and of developing type 2 diabetes. To check for diabetes, you may have a follow-up glucose tolerance test 6 to 12 weeks after your baby is born or after you stop breast-feeding your baby. You may be able to control your blood sugar with a healthy diet and regular exercise. Staying at a healthy weight also may keep you from getting type 2 diabetes later on. If diet and exercise do not lower your blood sugar enough, you may need to take insulin shots. Insulin is safe during pregnancy. Preventing Infection at Home    We care about preventing infection and avoiding the spread of germs - not only when you are in the hospital but also when you return home. When you return home from the hospital, it's important to take the following steps to help prevent infection and avoid spreading germs that could infect you and others. Ask everyone in your home to follow these guidelines, too.     Clean Your Hands  · Clean your hands whenever your hands are visibly dirty, before you eat, before or after touching your mouth, nose or eyes, and before preparing food. Clean them after contact with body fluids, using the restroom, touching animals or changing diapers. · When washing hands, wet them with warm water and work up a lather. Rub hands for at least 15 seconds, then rinse them and pat them dry with a clean towel or paper towel. · When using hand sanitizers, it should take about 15 seconds to rub your hands dry. If not, you probably didn't apply enough . Cover Your Sneeze or Cough  Germs are released into the air whenever you sneeze or cough. To prevent the spread of infection:  · Turn away from other people before coughing or sneezing. · Cover your mouth or nose with a tissue when you cough or sneeze. Put the tissue in the trash. · If you don't have a tissue, cough or sneeze into your upper sleeve, not your hands. · Always clean your hands after coughing or sneezing. Care for Wounds  Your skin is your body's first line of defense against germs, but an open wound leaves an easy way for germs to enter your body. To prevent infection:  · Clean your hands before and after changing wound dressings, and wear gloves to change dressings if recommended by your doctor. · Take special care with IV lines or other devices inserted into the body. If you must touch them, clean your hands first.  · Follow any specific instructions from your doctor to care for your wounds. Contact your doctor if you experience any signs of infection, such as fever or increased redness at the surgical or wound site. Keep a Clean Home  · Clean or wipe commonly touched hard surfaces like door handles, sinks, tabletops, phones and TV remotes. · Use products labeled \"disinfectant\" to kill harmful bacteria and viruses. · Use a clean cloth or paper towel to clean and dry surfaces.  Wiping surfaces with a dirty dishcloth, sponge or towel will only spread germs.  · Never share toothbrushes, phillips, drinking glasses, utensils, razor blades, face cloths or bath towels to avoid spreading germs. · Be sure that the linens that you sleep on are clean. · Keep pets away from wounds and wash your hands after touching pets, their toys or bedding. We care about you and your health. Remember, preventing infections is a   team effort between you, your family, friends and health care providers. Postpartum Support    PARENTS:  Are you feeling sad or depressed? Is it difficult for you to enjoy yourself? Do you feel more irritable or tense? Do you feel anxious or panicky? Are you having difficulty bonding with your baby? Do you feel as if you are \"out of control\" or \"going crazy\"? Are you worried that you might hurt your baby or yourself? FAMILIES: Do you worry that something is wrong but don't know how to help? Do you think that your partner or spouse is having problems coping? Are you worried that it may never get better? While many women experience some mild mood change or \"the blues\" during or after the birth of a child, 1 in 9 women experience more significant symptoms of depression or anxiety. 1 in 10 Dads become depressed during the first year. Things you can do  Being a good parent includes taking care of yourself. If you take care of yourself, you will be able to take better care of your baby and your family. · Talk to a counselor or healthcare provider who has training in  mood and anxiety problems. · Learn as much as you can about pregnancy and postpartum depression and anxiety. · Get support from family and friends. Ask for help when you need it. · Join a support group in your area or online. · Keep active by walking, stretching or whatever form of exercise helps you to feel better. · Get enough rest and time for yourself. · Eat a healthy diet. · Don't give up! It may take more than one try to get the right help you need.        These are general instructions for a healthy lifestyle:    No smoking/ No tobacco products/ Avoid exposure to second hand smoke    Surgeon General's Warning:  Quitting smoking now greatly reduces serious risk to your health. Obesity, smoking, and sedentary lifestyle greatly increases your risk for illness    A healthy diet, regular physical exercise & weight monitoring are important for maintaining a healthy lifestyle    Recognize signs and symptoms of STROKE:    F-face looks uneven    A-arms unable to move or move unevenly    S-speech slurred or non-existent    T-time-call 911 as soon as signs and symptoms begin - DO NOT go       back to bed or wait to see if you get better - TIME IS BRAIN. I have had the opportunity to make my options or choices for discharge. I have received and understand these instructions. Patient Education        Enoxaparin (Lovenox): Care Instructions  Your Care Instructions        Enoxaparin (Lovenox) is an anticoagulant medicine. It is one of a class of anticoagulants called low molecular weight heparin. Many people call these medicines blood thinners. They don't actually thin the blood, but they increase the time it takes a blood clot to form. This reduces the chance of a blood clot in the leg veins (deep vein thrombosis) or in the lungs (pulmonary embolism). Enoxaparin is a shot (injection). You or someone caring for you will inject it once or twice a day. Most people need shots for 5 to 10 days, but in some cases it can be longer. Your doctor will tell you how long you need to have the shots. Enoxaparin is used to:  · Treat deep vein thrombosis (DVT), which is a blood clot in the legs, pelvis, or arms. · Reduce the chance of getting blood clots after certain surgeries. For example, you may take enoxaparin after knee or hip replacement surgery. · Reduce the chance of getting blood clots in people who are likely to get them and who are not active for a long period of time.  For example, you may need enoxaparin if you need to stay in bed for a long time because of a health problem. · Reduce the chance of blood clots when another blood thinner is stopped for a short time. For example, if you take warfarin and need surgery, your doctor may ask you to stop taking warfarin for a short time before the surgery. If you have a high risk of blood clots during this time, you may take enoxaparin before the surgery. After the surgery, your doctor will tell you when it is safe to start taking warfarin again. This is called bridge therapy. Follow-up care is a key part of your treatment and safety. Be sure to make and go to all appointments, and call your doctor if you are having problems. It's also a good idea to know your test results and keep a list of the medicines you take. How can you care for yourself at home? How to inject enoxaparin  You will get a prescription for prefilled syringes. Inject the medicine at the same time every day unless your doctor gives you other instructions. 1. Wash and dry your hands. 2. Sit or lie in a position that lets you see your belly. 3. Clean the injection site with an alcohol pad or swab, and let it dry. Choose a site on the right or left side of your belly, at least 2 inches from your belly button. Change the site each time you inject the medicine. 4. Remove the needle cap by pulling it straight off. Don't twist it. 5. Hold the syringe like a pencil in one hand. With the other hand, pinch an area of the injection site skin. You should have a \"fold\" in the skin. 6. Insert the entire needle straight down into the fold of skin. Don't insert the needle at an angle. 7. Press the plunger with your thumb until the syringe is empty. 8. Pull the needle straight out and let go of the skin. 9. Point the needle away from you and press down on the plunger. The needle will be covered. Take precautions  · Don't rub the injection site.  This could cause bruising. · Don't push air bubbles out of the syringe unless your doctor tells you to. Each syringe comes with air bubbles. · Don't stop taking enoxaparin without talking to your doctor. · Be sure you get instructions about how to take your medicine safely. Blood thinners can cause serious bleeding problems. · Talk to your doctor before you take any prescription medicines, over-the-counter medicines, antibiotics, vitamins, or herbal products. · Don't take the following medicines unless your doctor says it's okay:  ? Aspirin, products like aspirin (salicylates), or products that contain aspirin  ? Nonsteroidal anti-inflammatory drugs (NSAIDs), such as ibuprofen (Advil, Motrin) and naproxen (Aleve)  · Store enoxaparin at room temperature. Don't put it in the refrigerator or freezer. When should you call for help? Call 911 anytime you think you may need emergency care. For example, call if:    · You passed out (lost consciousness).     · You have signs of severe bleeding, such as:  ? A severe headache that is different from past headaches. ? Vomiting blood or what looks like coffee grounds. ? Passing maroon or very bloody stools. Call your doctor now or seek immediate medical care if:    · You have unexpected bleeding, including:  ? Blood in stools or black stools that look like tar.  ? Blood in your urine. ? Bruises or blood spots under the skin.     · You feel dizzy or lightheaded. Watch closely for changes in your health, and be sure to contact your doctor if:    · You do not get better as expected. Where can you learn more? Go to http://www.gray.com/  Enter P266 in the search box to learn more about \"Enoxaparin (Lovenox): Care Instructions. \"  Current as of: December 16, 2019               Content Version: 12.6  © 1999-8712 [employer].    Care instructions adapted under license by [employer] (which disclaims liability or warranty for this information). If you have questions about a medical condition or this instruction, always ask your healthcare professional. [place] [address] any warranty or liability for your use of this information.

## 2021-03-01 NOTE — DISCHARGE SUMMARY
Obstetrical Discharge Summary     Name: Neville Drew MRN: [id number]  SSN: [id number]    YOB: 1985  Age: 37 y.o. Sex: female      Admit Date: 2021    Discharge Date: 3/1/2021     Admitting Physician: Chava Marcos MD     Attending Physician:  Irene Arnett MD     Admission Diagnoses: Term pregnancy [Z34.90]    Discharge Diagnoses:   Information for the patient's :  Ember Castanon, Male Anabela Lewis [[id number]]   Delivery of a 2.8 kg male infant via , Low Transverse on 2021 at 11:31 PM  by [place]. Apgars were 8  and 9 . Additional Diagnoses:   Hospital Problems  Date Reviewed: 2021          Codes Class Noted POA    Term pregnancy ICD-10-CM: Z34.90  ICD-9-CM: V22.1  2021 Unknown             Lab Results   Component Value Date/Time    Rubella, External Immune 2020    GrBStrep, External positive 2021       Hospital Course: Normal hospital course following the delivery. CHTN - BPs normal/mild x24h   - on nifedipine 90mg XL and labetalol prn (none in over 48hours)  - BP check in one week     Mild tachycardia - no s/s of infection, no SOB/CP     Pt reports hx of blood clot - upon questioning is sounds like a superficial clot related to IV, however, patient reports being sent home on blood thinner for a few weeks. She requests going home on lovenox. Will send x6w     Depression - on celexa 10mg. Will continued. Denies SI.  - mood check in 1-2 weeks, safety precautions reviewed       Disposition at Discharge: Home or self care    Discharged Condition: Stable    Patient Instructions:   Current Discharge Medication List      START taking these medications    Details   enoxaparin (LOVENOX) 40 mg/0.4 mL 0.4 mL by SubCUTAneous route every twenty-four (24) hours. Qty: 40 Syringe, Refills: 0      HYDROcodone-acetaminophen (NORCO) 5-325 mg per tablet Take 1 Tab by mouth every four (4) hours as needed for Pain for up to 5 days.  Max Daily Amount: 6 Tabs.  Qty: 30 Tab, Refills: 0    Associated Diagnoses:  delivery delivered         CONTINUE these medications which have NOT CHANGED    Details   PNV Comb #2-Iron-Omega 3-FA [id number] mg cmpk Take  by mouth.      citalopram (CeleXA) 10 mg tablet Take 10 mg by mouth daily. Indications: anxiousness associated with depression, posttraumatic stress syndrome         STOP taking these medications       labetaloL (NORMODYNE) 300 mg tablet Comments:   Reason for Stopping:         calcium carbonate (TUMS) 200 mg calcium (500 mg) chew Comments:   Reason for Stopping:         aspirin delayed-release 81 mg tablet Comments:   Reason for Stopping:         insulin NPH (HumuLIN N NPH U-100 Insulin) 100 unit/mL injection Comments:   Reason for Stopping:         famotidine (Pepcid AC) 20 mg tablet Comments:   Reason for Stopping:               Reference my discharge instructions.    Follow-up Appointments   Procedures   • FOLLOW UP VISIT Appointment in: One Week BP and mood check     BP and mood check     Standing Status:   Standing     Number of Occurrences:   1     Order Specific Question:   Appointment in     Answer:   One Week        Signed By:  Ijeoma Ga MD     2021                      .

## 2021-03-01 NOTE — PROGRESS NOTES
Post-Operative  Day 3    Marina Dobbins       Assessment: Post-Op day 3, doing well    Plan:   1. Discharge home today  2. Follow up in office in 6 weeks with Cheryl Guzman MD  3. Post partum activity/wound care advised, diet as tolerated  4. Discharge Medications: pain medication per discharge summary and medications prior to admission    CHTN - BPs normal/mild x24h   - on nifedipine 90mg XL and labetalol prn (none in over 48hours)  - BP check in one week    Mild tachycardia - no s/s of infection, no SOB/CP    Pt reports hx of blood clot - upon questioning is sounds like a superficial clot related to IV, however, patient reports being sent home on blood thinner for a few weeks. She requests going home on lovenox. Will send x6w    Depression - on celexa 10mg. Will continued. Denies SI.  - mood check in 1-2 weeks, safety precautions reviewed      Information for the patient's :  Marquez Monsalve, Male Janye Baltazar [[id number]]   , Low Transverse    Patient doing well without significant complaint. Tolerating diet, passing flatus, voiding and ambulating without difficulty    Vitals:  Visit Vitals  /78 (BP 1 Location: Right upper arm, BP Patient Position: Sitting)   Pulse (!) 115   Temp 98.8 °F (37.1 °C)   Resp 18   Ht 5' 7\" (1.702 m)   Wt 105.2 kg (232 lb)   SpO2 97%   Breastfeeding Unknown   BMI 36.34 kg/m²     Temp (24hrs), Av.6 °F (37 °C), Min:98.3 °F (36.8 °C), Max:98.8 °F (37.1 °C)        Exam:        Patient without distress. Abdomen,soft, expected tenderness, fundus firm                Wound dressing clean, dry and intact               Lower extremities are symmetric without tenderness, cords or erythema.  No edema    Labs:   Lab Results   Component Value Date/Time    WBC 24.0 (H) 2021 04:16 AM    WBC 14.8 (H) 2021 05:08 PM    WBC 15.9 (H) 2021 12:41 PM    HGB 10.8 (L) 2021 04:16 AM    HGB 11.5 2021 05:08 PM    HGB 12.1 02/22/2021 12:41 PM    HCT 33.5 (L) 02/28/2021 04:16 AM    HCT 34.9 (L) 02/25/2021 05:08 PM    HCT 36.3 02/22/2021 12:41 PM    PLATELET 029 28/73/7966 04:16 AM    PLATELET 125 64/47/1839 05:08 PM    PLATELET 031 13/17/2680 12:41 PM    Hgb, External 14.2 08/13/2020    Hct, External 42.2 08/13/2020       No results found for this or any previous visit (from the past 24 hour(s)).

## 2021-03-06 ENCOUNTER — APPOINTMENT (OUTPATIENT)
Dept: CT IMAGING | Age: 36
DRG: 951 | End: 2021-03-06
Attending: EMERGENCY MEDICINE
Payer: MEDICAID

## 2021-03-06 ENCOUNTER — HOSPITAL ENCOUNTER (INPATIENT)
Age: 36
LOS: 6 days | Discharge: HOME | DRG: 951 | End: 2021-03-12
Attending: EMERGENCY MEDICINE | Admitting: OBSTETRICS & GYNECOLOGY
Payer: MEDICAID

## 2021-03-06 DIAGNOSIS — T81.49XA POST-OPERATIVE WOUND ABSCESS: Primary | ICD-10-CM

## 2021-03-06 LAB
ALBUMIN SERPL-MCNC: 2.2 G/DL (ref 3.5–5)
ALBUMIN/GLOB SERPL: 0.5 {RATIO} (ref 1.1–2.2)
ALP SERPL-CCNC: 97 U/L (ref 45–117)
ALT SERPL-CCNC: 14 U/L (ref 12–78)
ANION GAP SERPL CALC-SCNC: 8 MMOL/L (ref 5–15)
AST SERPL-CCNC: 11 U/L (ref 15–37)
BASOPHILS # BLD: 0.2 K/UL (ref 0–0.1)
BASOPHILS NFR BLD: 1 % (ref 0–1)
BILIRUB SERPL-MCNC: 0.2 MG/DL (ref 0.2–1)
BUN SERPL-MCNC: 12 MG/DL (ref 6–20)
BUN/CREAT SERPL: 18 (ref 12–20)
CALCIUM SERPL-MCNC: 8.6 MG/DL (ref 8.5–10.1)
CHLORIDE SERPL-SCNC: 106 MMOL/L (ref 97–108)
CO2 SERPL-SCNC: 24 MMOL/L (ref 21–32)
COMMENT, HOLDF: NORMAL
CREAT SERPL-MCNC: 0.66 MG/DL (ref 0.55–1.02)
DIFFERENTIAL METHOD BLD: ABNORMAL
EOSINOPHIL # BLD: 0.2 K/UL (ref 0–0.4)
EOSINOPHIL NFR BLD: 1 % (ref 0–7)
ERYTHROCYTE [DISTWIDTH] IN BLOOD BY AUTOMATED COUNT: 14 % (ref 11.5–14.5)
GLOBULIN SER CALC-MCNC: 4.4 G/DL (ref 2–4)
GLUCOSE SERPL-MCNC: 116 MG/DL (ref 65–100)
HCT VFR BLD AUTO: 34.7 % (ref 35–47)
HGB BLD-MCNC: 11.3 G/DL (ref 11.5–16)
IMM GRANULOCYTES # BLD AUTO: 0 K/UL
IMM GRANULOCYTES NFR BLD AUTO: 0 %
LACTATE SERPL-SCNC: 0.6 MMOL/L (ref 0.4–2)
LYMPHOCYTES # BLD: 2.8 K/UL (ref 0.8–3.5)
LYMPHOCYTES NFR BLD: 12 % (ref 12–49)
MCH RBC QN AUTO: 29 PG (ref 26–34)
MCHC RBC AUTO-ENTMCNC: 32.6 G/DL (ref 30–36.5)
MCV RBC AUTO: 89.2 FL (ref 80–99)
MONOCYTES # BLD: 1.7 K/UL (ref 0–1)
MONOCYTES NFR BLD: 7 % (ref 5–13)
MYELOCYTES NFR BLD MANUAL: 1 %
NEUTS BAND NFR BLD MANUAL: 15 % (ref 0–6)
NEUTS SEG # BLD: 18.5 K/UL (ref 1.8–8)
NEUTS SEG NFR BLD: 63 % (ref 32–75)
NRBC # BLD: 0 K/UL (ref 0–0.01)
NRBC BLD-RTO: 0 PER 100 WBC
PLATELET # BLD AUTO: 554 K/UL (ref 150–400)
PMV BLD AUTO: 9 FL (ref 8.9–12.9)
POTASSIUM SERPL-SCNC: 3 MMOL/L (ref 3.5–5.1)
PROT SERPL-MCNC: 6.6 G/DL (ref 6.4–8.2)
RBC # BLD AUTO: 3.89 M/UL (ref 3.8–5.2)
RBC MORPH BLD: ABNORMAL
SAMPLES BEING HELD,HOLD: NORMAL
SODIUM SERPL-SCNC: 138 MMOL/L (ref 136–145)
WBC # BLD AUTO: 23.7 K/UL (ref 3.6–11)
WBC MORPH BLD: ABNORMAL

## 2021-03-06 PROCEDURE — 74011000636 HC RX REV CODE- 636: Performed by: RADIOLOGY

## 2021-03-06 PROCEDURE — 96374 THER/PROPH/DIAG INJ IV PUSH: CPT

## 2021-03-06 PROCEDURE — 87040 BLOOD CULTURE FOR BACTERIA: CPT

## 2021-03-06 PROCEDURE — 74011000258 HC RX REV CODE- 258: Performed by: OBSTETRICS & GYNECOLOGY

## 2021-03-06 PROCEDURE — 87186 SC STD MICRODIL/AGAR DIL: CPT

## 2021-03-06 PROCEDURE — 74011000250 HC RX REV CODE- 250: Performed by: EMERGENCY MEDICINE

## 2021-03-06 PROCEDURE — 87205 SMEAR GRAM STAIN: CPT

## 2021-03-06 PROCEDURE — 96365 THER/PROPH/DIAG IV INF INIT: CPT

## 2021-03-06 PROCEDURE — 99284 EMERGENCY DEPT VISIT MOD MDM: CPT

## 2021-03-06 PROCEDURE — 87077 CULTURE AEROBIC IDENTIFY: CPT

## 2021-03-06 PROCEDURE — 0H97XZZ DRAINAGE OF ABDOMEN SKIN, EXTERNAL APPROACH: ICD-10-PCS | Performed by: OBSTETRICS & GYNECOLOGY

## 2021-03-06 PROCEDURE — 74011250636 HC RX REV CODE- 250/636: Performed by: OBSTETRICS & GYNECOLOGY

## 2021-03-06 PROCEDURE — 36415 COLL VENOUS BLD VENIPUNCTURE: CPT

## 2021-03-06 PROCEDURE — 74177 CT ABD & PELVIS W/CONTRAST: CPT

## 2021-03-06 PROCEDURE — 74011000250 HC RX REV CODE- 250: Performed by: OBSTETRICS & GYNECOLOGY

## 2021-03-06 PROCEDURE — 80053 COMPREHEN METABOLIC PANEL: CPT

## 2021-03-06 PROCEDURE — 87185 SC STD ENZYME DETCJ PER NZM: CPT

## 2021-03-06 PROCEDURE — 85025 COMPLETE CBC W/AUTO DIFF WBC: CPT

## 2021-03-06 PROCEDURE — 74011250637 HC RX REV CODE- 250/637: Performed by: OBSTETRICS & GYNECOLOGY

## 2021-03-06 PROCEDURE — 74011250636 HC RX REV CODE- 250/636: Performed by: EMERGENCY MEDICINE

## 2021-03-06 PROCEDURE — 96366 THER/PROPH/DIAG IV INF ADDON: CPT

## 2021-03-06 PROCEDURE — 65270000029 HC RM PRIVATE

## 2021-03-06 PROCEDURE — 83605 ASSAY OF LACTIC ACID: CPT

## 2021-03-06 PROCEDURE — 2709999900 HC NON-CHARGEABLE SUPPLY

## 2021-03-06 PROCEDURE — 96375 TX/PRO/DX INJ NEW DRUG ADDON: CPT

## 2021-03-06 RX ORDER — ONDANSETRON 2 MG/ML
4 INJECTION INTRAMUSCULAR; INTRAVENOUS
Status: COMPLETED | OUTPATIENT
Start: 2021-03-06 | End: 2021-03-06

## 2021-03-06 RX ORDER — MORPHINE SULFATE 4 MG/ML
INJECTION, SOLUTION INTRAMUSCULAR; INTRAVENOUS
Status: DISPENSED
Start: 2021-03-06 | End: 2021-03-06

## 2021-03-06 RX ORDER — SODIUM CHLORIDE, SODIUM LACTATE, POTASSIUM CHLORIDE, CALCIUM CHLORIDE 600; 310; 30; 20 MG/100ML; MG/100ML; MG/100ML; MG/100ML
100 INJECTION, SOLUTION INTRAVENOUS CONTINUOUS
Status: DISCONTINUED | OUTPATIENT
Start: 2021-03-06 | End: 2021-03-11

## 2021-03-06 RX ORDER — SODIUM CHLORIDE 0.9 % (FLUSH) 0.9 %
5-40 SYRINGE (ML) INJECTION EVERY 8 HOURS
Status: DISCONTINUED | OUTPATIENT
Start: 2021-03-06 | End: 2021-03-13 | Stop reason: HOSPADM

## 2021-03-06 RX ORDER — ONDANSETRON 2 MG/ML
4 INJECTION INTRAMUSCULAR; INTRAVENOUS
Status: DISCONTINUED | OUTPATIENT
Start: 2021-03-06 | End: 2021-03-13 | Stop reason: HOSPADM

## 2021-03-06 RX ORDER — NALOXONE HYDROCHLORIDE 0.4 MG/ML
0.4 INJECTION, SOLUTION INTRAMUSCULAR; INTRAVENOUS; SUBCUTANEOUS AS NEEDED
Status: DISCONTINUED | OUTPATIENT
Start: 2021-03-06 | End: 2021-03-13 | Stop reason: HOSPADM

## 2021-03-06 RX ORDER — MORPHINE SULFATE 4 MG/ML
4 INJECTION INTRAVENOUS
Status: COMPLETED | OUTPATIENT
Start: 2021-03-06 | End: 2021-03-06

## 2021-03-06 RX ORDER — OXYCODONE AND ACETAMINOPHEN 5; 325 MG/1; MG/1
2 TABLET ORAL
Status: DISCONTINUED | OUTPATIENT
Start: 2021-03-06 | End: 2021-03-13 | Stop reason: HOSPADM

## 2021-03-06 RX ORDER — KETOROLAC TROMETHAMINE 30 MG/ML
30 INJECTION, SOLUTION INTRAMUSCULAR; INTRAVENOUS
Status: DISCONTINUED | OUTPATIENT
Start: 2021-03-06 | End: 2021-03-07

## 2021-03-06 RX ORDER — DIPHENHYDRAMINE HYDROCHLORIDE 50 MG/ML
12.5 INJECTION, SOLUTION INTRAMUSCULAR; INTRAVENOUS
Status: DISCONTINUED | OUTPATIENT
Start: 2021-03-06 | End: 2021-03-13 | Stop reason: HOSPADM

## 2021-03-06 RX ORDER — VANCOMYCIN 1.75 GRAM/500 ML IN 0.9 % SODIUM CHLORIDE INTRAVENOUS
1750 EVERY 12 HOURS
Status: DISCONTINUED | OUTPATIENT
Start: 2021-03-06 | End: 2021-03-08

## 2021-03-06 RX ORDER — ZOLPIDEM TARTRATE 5 MG/1
5 TABLET ORAL
Status: DISCONTINUED | OUTPATIENT
Start: 2021-03-06 | End: 2021-03-13 | Stop reason: HOSPADM

## 2021-03-06 RX ORDER — OXYCODONE AND ACETAMINOPHEN 7.5; 325 MG/1; MG/1
2 TABLET ORAL
Status: DISCONTINUED | OUTPATIENT
Start: 2021-03-06 | End: 2021-03-13 | Stop reason: HOSPADM

## 2021-03-06 RX ORDER — SODIUM CHLORIDE 0.9 % (FLUSH) 0.9 %
5-40 SYRINGE (ML) INJECTION AS NEEDED
Status: DISCONTINUED | OUTPATIENT
Start: 2021-03-06 | End: 2021-03-13 | Stop reason: HOSPADM

## 2021-03-06 RX ADMIN — VANCOMYCIN HYDROCHLORIDE 1750 MG: 10 INJECTION, POWDER, LYOPHILIZED, FOR SOLUTION INTRAVENOUS at 21:02

## 2021-03-06 RX ADMIN — MORPHINE SULFATE 4 MG: 4 INJECTION INTRAVENOUS at 08:14

## 2021-03-06 RX ADMIN — ONDANSETRON 4 MG: 2 INJECTION INTRAMUSCULAR; INTRAVENOUS at 18:15

## 2021-03-06 RX ADMIN — KETOROLAC TROMETHAMINE 30 MG: 30 INJECTION, SOLUTION INTRAMUSCULAR at 12:54

## 2021-03-06 RX ADMIN — BACITRACIN, NEOMYCIN, POLYMYXIN B 1 PACKET: 400; 3.5; 5 OINTMENT TOPICAL at 18:19

## 2021-03-06 RX ADMIN — Medication 10 ML: at 21:02

## 2021-03-06 RX ADMIN — MORPHINE SULFATE 4 MG: 4 INJECTION INTRAVENOUS at 09:54

## 2021-03-06 RX ADMIN — ONDANSETRON 4 MG: 2 INJECTION INTRAMUSCULAR; INTRAVENOUS at 12:54

## 2021-03-06 RX ADMIN — VANCOMYCIN HYDROCHLORIDE 2250 MG: 10 INJECTION, POWDER, LYOPHILIZED, FOR SOLUTION INTRAVENOUS at 08:39

## 2021-03-06 RX ADMIN — SODIUM CHLORIDE, POTASSIUM CHLORIDE, SODIUM LACTATE AND CALCIUM CHLORIDE 100 ML/HR: 600; 310; 30; 20 INJECTION, SOLUTION INTRAVENOUS at 12:54

## 2021-03-06 RX ADMIN — IOPAMIDOL 100 ML: 755 INJECTION, SOLUTION INTRAVENOUS at 08:47

## 2021-03-06 RX ADMIN — GENTAMICIN SULFATE 120 MG: 40 INJECTION, SOLUTION INTRAMUSCULAR; INTRAVENOUS at 19:49

## 2021-03-06 RX ADMIN — CEFEPIME HYDROCHLORIDE 2 G: 2 INJECTION, POWDER, FOR SOLUTION INTRAVENOUS at 08:14

## 2021-03-06 RX ADMIN — OXYCODONE AND ACETAMINOPHEN 2 TABLET: 7.5; 325 TABLET ORAL at 18:14

## 2021-03-06 RX ADMIN — BACITRACIN, NEOMYCIN, POLYMYXIN B 1 PACKET: 400; 3.5; 5 OINTMENT TOPICAL at 10:53

## 2021-03-06 RX ADMIN — GENTAMICIN SULFATE 120 MG: 40 INJECTION, SOLUTION INTRAMUSCULAR; INTRAVENOUS at 11:36

## 2021-03-06 RX ADMIN — Medication 10 ML: at 12:58

## 2021-03-06 RX ADMIN — ONDANSETRON 4 MG: 2 INJECTION INTRAMUSCULAR; INTRAVENOUS at 08:14

## 2021-03-06 NOTE — ED PROVIDER NOTES
31-year-old female presents with drainage from her  scar. She has a wound VAC in place and noted that the wound VAC stopped working. She noticed pus and redness at the site. She also reports tenderness that is been ongoing since her  last Friday. She denies any fevers or chills. She denies any nausea or vomiting. She is urinating and moving her bowels normally. On chart review patient was admitted 2020 to 2020 for . She delivered a 2.8 kg male via .  performed by Dr. Lois Irene. She has a history of a blood clot and went home on Lovenox.       Post OP Complication         Past Medical History:   Diagnosis Date    Anemia     receiving IV iron last dose in 2020    Asthma     inhaler PRN    Epilepsy [facility])     2014    Essential hypertension     Gestational diabetes     Heart abnormality     mild heart murmur no medications required    Herpes simplex virus (HSV) infection     Infertility, female     Liver disease     Polycystic disease, ovaries     Psychiatric problem     depression       Past Surgical History:   Procedure Laterality Date    HX OTHER SURGICAL      appendix, gallbladder, tonsils & wisdom teeth    IN GASTRIC BYPASS,OBESE<150CM WENDY-EN-Y      pyloraplasty          Family History:   Problem Relation Age of Onset    Hypertension Mother     Diabetes Paternal Uncle     Cancer Paternal Grandmother     Stroke Paternal Grandmother     Alcohol abuse Paternal Grandfather        Social History     Socioeconomic History    Marital status: SINGLE     Spouse name: Not on file    Number of children: Not on file    Years of education: Not on file    Highest education level: High school graduate   Occupational History    Occupation: PCA   Social Needs    Financial resource strain: Not very hard    Food insecurity     Worry: Never true     Inability: Never true    Transportation needs     Medical: No     Non-medical: No Tobacco Use    Smoking status: Former Smoker    Smokeless tobacco: Never Used   Substance and Sexual Activity    Alcohol use: Never     Frequency: Never    Drug use: Yes     Frequency: 7.0 times per week     Types: Marijuana     Comment: medically purposes due to PTSD    Sexual activity: Not on file   Lifestyle    Physical activity     Days per week: Not on file     Minutes per session: Not on file    Stress: Not on file   Relationships    Social connections     Talks on phone: Not on file     Gets together: Not on file     Attends [employer] service: Not on file     Active member of club or organization: Not on file     Attends meetings of clubs or organizations: Not on file     Relationship status: Not on file    Intimate partner violence     Fear of current or ex partner: Not on file     Emotionally abused: Not on file     Physically abused: Not on file     Forced sexual activity: Not on file   Other Topics Concern     Service Not Asked    Blood Transfusions Not Asked    Caffeine Concern Not Asked    Occupational Exposure Not Asked   Veto Risk Hazards Not Asked    Sleep Concern Not Asked    Stress Concern Not Asked    Weight Concern Not Asked    Special Diet Not Asked    Back Care Not Asked    Exercise Not Asked    Bike Helmet Not Asked    Seat Belt Not Asked    Self-Exams Not Asked   Social History Narrative    Not on file         ALLERGIES: Latex, Amoxicillin, Bactrim [sulfamethoprim], Pcn [penicillins], and Sulfa (sulfonamide antibiotics)    Review of Systems   All other systems reviewed and are negative. Vitals:    03/06/21 0727   BP: 121/71   Pulse: (!) 109   Resp: 16   Temp: 98.8 °F (37.1 °C)   SpO2: 98%   Weight: 104.3 kg (230 lb)   Height: 5' 7\" (1.702 m)            Physical Exam  Vitals signs and nursing note reviewed. Constitutional:       General: She is not in acute distress. HENT:      Head: Normocephalic and atraumatic.       Mouth/Throat:      Mouth: Mucous membranes are moist.   Eyes:      General: No scleral icterus. Conjunctiva/sclera: Conjunctivae normal.      Pupils: Pupils are equal, round, and reactive to light. Neck:      Musculoskeletal: Neck supple. Trachea: No tracheal deviation. Cardiovascular:      Rate and Rhythm: Regular rhythm. Tachycardia present. Pulmonary:      Effort: Pulmonary effort is normal. No respiratory distress. Breath sounds: No wheezing or rales. Abdominal:      General: There is no distension. Palpations: Abdomen is soft. Tenderness: There is no abdominal tenderness. Genitourinary:     Comments: deferred  Musculoskeletal:         General: No deformity. Skin:     General: Skin is warm and dry. Comments: Fluctuance and drainage over low transverse  incision. Wound VAC in place but not functioning with purulent drainage surrounding. Neurological:      General: No focal deficit present. Mental Status: She is alert. Psychiatric:         Mood and Affect: Mood normal.          MDM  Number of Diagnoses or Management Options  Diagnosis management comments: 82-year-old female presents with drainage from her  wound and likely abscess found to have tachycardia. Differential diagnosis includes abscess, sepsis, seroma, hematoma. Covered with broad-spectrum antibiotics. Given IV fluids, pain medicine, nausea medicine. Will admit to [facility] hospitalist.    ED Course as of Mar 06 0948   Sat Mar 06, 2021   0910 Spoke to Dr. Tona Zelaya. Will see patient and likely admit her. Agrees with work-up and plan. [TT]      ED Course User Index  [TT] Silverio Barcenas MD       Procedures    9:48 AM  Spoke to Dr. Tona Zelaya. Will come to see patient in cleanouts wound. He will admit her to the floor.     LABORATORY TESTS:  Labs Reviewed   CBC WITH AUTOMATED DIFF - Abnormal; Notable for the following components:       Result Value    WBC 23.7 (*)     HGB 11.3 (*)     HCT 34.7 (*)     PLATELET 051 (*) BAND NEUTROPHILS 15 (*)     MYELOCYTES 1 (*)     ABS. NEUTROPHILS 18.5 (*)     ABS. MONOCYTES 1.7 (*)     ABS. BASOPHILS 0.2 (*)     All other components within normal limits   METABOLIC PANEL, COMPREHENSIVE - Abnormal; Notable for the following components:    Potassium 3.0 (*)     Glucose 116 (*)     AST (SGOT) 11 (*)     Albumin 2.2 (*)     Globulin 4.4 (*)     A-G Ratio 0.5 (*)     All other components within normal limits   CULTURE, BLOOD, PAIRED   CULTURE, WOUND W GRAM STAIN   SAMPLES BEING HELD   LACTIC ACID       IMAGING RESULTS:  CT ABD PELV W CONT   Final Result   Loculated fluid and air-containing collection in the preuterine space measuring   131 x 45 x 72 mm in size. Associated reactive changes in the bladder small bowel   and colon. MEDICATIONS GIVEN:  Medications   vancomycin (VANCOCIN) 2,250 mg in 0.9% sodium chloride 500 mL IVPB (2,250 mg IntraVENous New Bag 3/6/21 0839)   morphine injection 4 mg (has no administration in time range)   cefepime (MAXIPIME) 2 g in sterile water (preservative free) 10 mL IV syringe (2 g IntraVENous Given 3/6/21 0814)   iopamidoL (ISOVUE-370) 76 % injection 100 mL (100 mL IntraVENous Given 3/6/21 0847)   morphine injection 4 mg (4 mg IntraVENous Given 3/6/21 0814)   ondansetron (ZOFRAN) injection 4 mg (4 mg IntraVENous Given 3/6/21 0814)         IMPRESSION/ PLAN:  1. Post-operative wound abscess      - IV antibiotics  - pain and nausea control  - admit to ob hospitalist    Total critical care time spent exclusive of procedures:  33 minutes      Ede Kenny MD

## 2021-03-06 NOTE — ED NOTES
TRANSFER - OUT REPORT:    Verbal report given to Daniela(name) on [facility]  being transferred to [facility](unit) for routine progression of care       Report consisted of patients Situation, Background, Assessment and   Recommendations(SBAR). Information from the following report(s) SBAR, Kardex, ED Summary, [facility] and Recent Results was reviewed with the receiving nurse. Lines:   Peripheral IV 03/06/21 Left Antecubital (Active)       Peripheral IV 03/06/21 Right (Active)        Opportunity for questions and clarification was provided.       Patient transported with:   Babak Lomeli, RN

## 2021-03-06 NOTE — ED TRIAGE NOTES
Patient presents to ED with c/o post C section complication. Patient reports C section on 2/26/21. Patient reports incision began to smell malodorous this morning and wound vac stopped working yesterday. Patient reports tenderness, redness, and pus at incision site.

## 2021-03-06 NOTE — H&P
Gynecology History and Physical    Name: Popeye Wang MRN: [id number] SSN: [id number]    YOB: 1985  Age: 37 y.o. Sex: female       Subjective:      Chief complaint:  \"Incision draining pus. \"    Michele Gasca is a 37 y.o.  female, [id number]  S/P  a week ago, presented on 21`with a history of fever/chills, chest palpitations, pelvic pain and pus draining from incision. The pt reported a pungent odor associated with her  incision. CT scan findings by ER suggested a wound abscess. Previous treatment measures included outpatient management with a RENE dressing. She denied abdominal distension, nausea, vomiting, body rash, chest pain, SOB, or syncope. Dr. Cindie Nissen was contacted for an OB/GYN consult and he recommended inpatient admission.         OB History        5    Para   1    Term   1            AB   4    Living   1       SAB   4    TAB        Ectopic        Molar        Multiple   0    Live Births   1              Past Medical History:   Diagnosis Date    Anemia     receiving IV iron last dose in 2020    Asthma     inhaler PRN    Epilepsy [facility])     2014    Essential hypertension     Gestational diabetes     Heart abnormality     mild heart murmur no medications required    Herpes simplex virus (HSV) infection     Infertility, female     Liver disease     Polycystic disease, ovaries     Psychiatric problem     depression     Past Surgical History:   Procedure Laterality Date    HX OTHER SURGICAL      appendix, gallbladder, tonsils & wisdom teeth    HI GASTRIC BYPASS,OBESE<150CM WENDY-EN-Y      pyloraplasty      Social History     Occupational History    Occupation: PCA   Tobacco Use    Smoking status: Former Smoker    Smokeless tobacco: Never Used   Substance and Sexual Activity    Alcohol use: Never     Frequency: Never    Drug use: Yes     Frequency: 7.0 times per week     Types: Marijuana     Comment: medically purposes due to PTSD    Sexual activity: Not on file     Family History   Problem Relation Age of Onset    Hypertension Mother     Diabetes Paternal Uncle     Cancer Paternal Grandmother     Stroke Paternal Grandmother     Alcohol abuse Paternal Grandfather         Allergies   Allergen Reactions    Latex Rash    Amoxicillin Rash and Swelling    Bactrim [Sulfamethoprim] Rash, Itching and Swelling    Pcn [Penicillins] Rash and Swelling    Sulfa (Sulfonamide Antibiotics) Rash and Swelling     Prior to Admission medications    Medication Sig Start Date End Date Taking? Authorizing Provider   enoxaparin (LOVENOX) 40 mg/0.4 mL 0.4 mL by SubCUTAneous route every twenty-four (24) hours. 3/1/21   Sanjiv Velasquez MD   HYDROcodone-acetaminophen [facility]) 5-325 mg per tablet Take 1 Tab by mouth every four (4) hours as needed for Pain for up to 5 days. Max Daily Amount: 6 Tabs. 3/1/21 3/6/21  Sanjiv Velasquez MD   PNV Comb #2-Iron-Omega 3-FA [id number] mg cmpk Take  by mouth. Provider, Historical   citalopram (CeleXA) 10 mg tablet Take 10 mg by mouth daily. Indications: anxiousness associated with depression, posttraumatic stress syndrome    Provider, Historical        Review of Systems  Constitutional: positive for fevers, chills, sweats and malaise  Eyes: negative  Ears, Nose, Mouth, Throat, and Face: negative  Respiratory: negative  Cardiovascular: positive for palpitations  Gastrointestinal: positive for abdominal pain  Genitourinary: negative for frequency, dysuria and hematuria  Integument/Breast: negative  Hematologic/Lymphatic: negative  Neurological: negative  Behavioral/Psychiatric: negative    Objective:     Vitals:    03/06/21 0730 03/06/21 1000 03/06/21 1004 03/06/21 1056   BP: 116/67 138/80 129/66 122/71   Pulse:    (!) 103   Resp:    18   Temp:    98.8 °F (37.1 °C)   SpO2: 99% 97% 98% 97%   Weight:       Height:           Physical Exam:  Patient without distress. Breast: non-engorged-not nursing.   Heart: tachycardia Lung: clear to auscultation throughout lung fields, no wheezes, no rales, no rhonchi and normal respiratory effort  Abdomen: Large red indurated swollen area mid-incision; soft, nondistended, normal bowel sounds, tenderness in the lower abdomen - moderate, without rebound; a 2cm opening was created in the middle of  incison and 150cc of pus irrigated out with 100cc of saline and peroxide. Wound was then dressed with a sterile dressing. External Genitalia: normal general appearance  Vagina: no lochia seen  Back- no CVA tenderness  EXT-  Neg Fadumo's  Assessment/Plan:     Postop  wound abscess. Plan: 1 Admit for inpatient IV antibiotics(vancomycin/gentamycin) and wound care.       Signed By:  Corinne Bellman, MD     2021

## 2021-03-07 LAB
BASOPHILS # BLD: 0 K/UL (ref 0–0.1)
BASOPHILS NFR BLD: 0 % (ref 0–1)
CREAT SERPL-MCNC: 0.56 MG/DL (ref 0.55–1.02)
DATE LAST DOSE: ABNORMAL
DIFFERENTIAL METHOD BLD: ABNORMAL
EOSINOPHIL # BLD: 0.2 K/UL (ref 0–0.4)
EOSINOPHIL NFR BLD: 1 % (ref 0–7)
ERYTHROCYTE [DISTWIDTH] IN BLOOD BY AUTOMATED COUNT: 14.2 % (ref 11.5–14.5)
HCT VFR BLD AUTO: 31.4 % (ref 35–47)
HGB BLD-MCNC: 10.1 G/DL (ref 11.5–16)
IMM GRANULOCYTES # BLD AUTO: 0 K/UL
IMM GRANULOCYTES NFR BLD AUTO: 0 %
LACTATE SERPL-SCNC: 1 MMOL/L (ref 0.4–2)
LYMPHOCYTES # BLD: 2.5 K/UL (ref 0.8–3.5)
LYMPHOCYTES NFR BLD: 11 % (ref 12–49)
MCH RBC QN AUTO: 29.3 PG (ref 26–34)
MCHC RBC AUTO-ENTMCNC: 32.2 G/DL (ref 30–36.5)
MCV RBC AUTO: 91 FL (ref 80–99)
METAMYELOCYTES NFR BLD MANUAL: 1 %
MONOCYTES # BLD: 0.9 K/UL (ref 0–1)
MONOCYTES NFR BLD: 4 % (ref 5–13)
MYELOCYTES NFR BLD MANUAL: 3 %
NEUTS BAND NFR BLD MANUAL: 2 % (ref 0–6)
NEUTS SEG # BLD: 18 K/UL (ref 1.8–8)
NEUTS SEG NFR BLD: 78 % (ref 32–75)
NRBC # BLD: 0 K/UL (ref 0–0.01)
NRBC BLD-RTO: 0 PER 100 WBC
PLATELET # BLD AUTO: 539 K/UL (ref 150–400)
PMV BLD AUTO: 9.1 FL (ref 8.9–12.9)
RBC # BLD AUTO: 3.45 M/UL (ref 3.8–5.2)
RBC MORPH BLD: ABNORMAL
REPORTED DOSE,DOSE: ABNORMAL UNITS
REPORTED DOSE/TIME,TMG: ABNORMAL
VANCOMYCIN TROUGH SERPL-MCNC: 10.2 UG/ML (ref 5–10)
WBC # BLD AUTO: 22.5 K/UL (ref 3.6–11)
WBC # BLD AUTO: 25.3 K/UL (ref 3.6–11)

## 2021-03-07 PROCEDURE — 83605 ASSAY OF LACTIC ACID: CPT

## 2021-03-07 PROCEDURE — 74011000258 HC RX REV CODE- 258: Performed by: OBSTETRICS & GYNECOLOGY

## 2021-03-07 PROCEDURE — 74011250637 HC RX REV CODE- 250/637: Performed by: OBSTETRICS & GYNECOLOGY

## 2021-03-07 PROCEDURE — 82565 ASSAY OF CREATININE: CPT

## 2021-03-07 PROCEDURE — 80170 ASSAY OF GENTAMICIN: CPT

## 2021-03-07 PROCEDURE — 74011000250 HC RX REV CODE- 250: Performed by: OBSTETRICS & GYNECOLOGY

## 2021-03-07 PROCEDURE — 85048 AUTOMATED LEUKOCYTE COUNT: CPT

## 2021-03-07 PROCEDURE — 87040 BLOOD CULTURE FOR BACTERIA: CPT

## 2021-03-07 PROCEDURE — 36415 COLL VENOUS BLD VENIPUNCTURE: CPT

## 2021-03-07 PROCEDURE — 74011250636 HC RX REV CODE- 250/636: Performed by: OBSTETRICS & GYNECOLOGY

## 2021-03-07 PROCEDURE — 65270000029 HC RM PRIVATE

## 2021-03-07 PROCEDURE — 85025 COMPLETE CBC W/AUTO DIFF WBC: CPT

## 2021-03-07 PROCEDURE — 80202 ASSAY OF VANCOMYCIN: CPT

## 2021-03-07 RX ORDER — FAMOTIDINE 20 MG/1
20 TABLET, FILM COATED ORAL DAILY
Status: DISCONTINUED | OUTPATIENT
Start: 2021-03-08 | End: 2021-03-13 | Stop reason: HOSPADM

## 2021-03-07 RX ORDER — METRONIDAZOLE 250 MG/1
500 TABLET ORAL EVERY 12 HOURS
Status: DISCONTINUED | OUTPATIENT
Start: 2021-03-07 | End: 2021-03-08

## 2021-03-07 RX ORDER — CITALOPRAM 20 MG/1
10 TABLET, FILM COATED ORAL DAILY
Status: DISCONTINUED | OUTPATIENT
Start: 2021-03-08 | End: 2021-03-12

## 2021-03-07 RX ORDER — ACETAMINOPHEN 325 MG/1
650 TABLET ORAL
Status: DISCONTINUED | OUTPATIENT
Start: 2021-03-07 | End: 2021-03-13 | Stop reason: HOSPADM

## 2021-03-07 RX ORDER — SWAB
1 SWAB, NON-MEDICATED MISCELLANEOUS DAILY
Status: DISCONTINUED | OUTPATIENT
Start: 2021-03-07 | End: 2021-03-13 | Stop reason: HOSPADM

## 2021-03-07 RX ORDER — ENOXAPARIN SODIUM 100 MG/ML
40 INJECTION SUBCUTANEOUS EVERY 24 HOURS
Status: DISCONTINUED | OUTPATIENT
Start: 2021-03-07 | End: 2021-03-13 | Stop reason: HOSPADM

## 2021-03-07 RX ADMIN — SODIUM CHLORIDE, POTASSIUM CHLORIDE, SODIUM LACTATE AND CALCIUM CHLORIDE 100 ML/HR: 600; 310; 30; 20 INJECTION, SOLUTION INTRAVENOUS at 02:14

## 2021-03-07 RX ADMIN — METRONIDAZOLE 500 MG: 250 TABLET ORAL at 21:44

## 2021-03-07 RX ADMIN — VANCOMYCIN HYDROCHLORIDE 1750 MG: 10 INJECTION, POWDER, LYOPHILIZED, FOR SOLUTION INTRAVENOUS at 22:06

## 2021-03-07 RX ADMIN — METRONIDAZOLE 500 MG: 250 TABLET ORAL at 11:04

## 2021-03-07 RX ADMIN — ENOXAPARIN SODIUM 40 MG: 40 INJECTION SUBCUTANEOUS at 21:43

## 2021-03-07 RX ADMIN — GENTAMICIN SULFATE 360 MG: 40 INJECTION, SOLUTION INTRAMUSCULAR; INTRAVENOUS at 11:23

## 2021-03-07 RX ADMIN — OXYCODONE AND ACETAMINOPHEN 2 TABLET: 7.5; 325 TABLET ORAL at 13:14

## 2021-03-07 RX ADMIN — GENTAMICIN SULFATE 120 MG: 40 INJECTION, SOLUTION INTRAMUSCULAR; INTRAVENOUS at 02:14

## 2021-03-07 RX ADMIN — KETOROLAC TROMETHAMINE 30 MG: 30 INJECTION, SOLUTION INTRAMUSCULAR at 09:48

## 2021-03-07 RX ADMIN — Medication 1 TABLET: at 21:45

## 2021-03-07 RX ADMIN — Medication 10 ML: at 05:26

## 2021-03-07 RX ADMIN — BACITRACIN, NEOMYCIN, POLYMYXIN B 1 PACKET: 400; 3.5; 5 OINTMENT TOPICAL at 09:48

## 2021-03-07 RX ADMIN — Medication 10 ML: at 11:23

## 2021-03-07 RX ADMIN — Medication 10 ML: at 22:11

## 2021-03-07 RX ADMIN — VANCOMYCIN HYDROCHLORIDE 1750 MG: 10 INJECTION, POWDER, LYOPHILIZED, FOR SOLUTION INTRAVENOUS at 08:26

## 2021-03-07 RX ADMIN — ONDANSETRON 4 MG: 2 INJECTION INTRAMUSCULAR; INTRAVENOUS at 19:19

## 2021-03-07 RX ADMIN — OXYCODONE AND ACETAMINOPHEN 2 TABLET: 7.5; 325 TABLET ORAL at 02:14

## 2021-03-07 RX ADMIN — BACITRACIN, NEOMYCIN, POLYMYXIN B 1 PACKET: 400; 3.5; 5 OINTMENT TOPICAL at 19:20

## 2021-03-07 RX ADMIN — OXYCODONE AND ACETAMINOPHEN 2 TABLET: 7.5; 325 TABLET ORAL at 19:59

## 2021-03-07 RX ADMIN — OXYCODONE HYDROCHLORIDE AND ACETAMINOPHEN 2 TABLET: 5; 325 TABLET ORAL at 08:26

## 2021-03-07 NOTE — PROGRESS NOTES
Problem: Falls - Risk of  Goal: *Absence of Falls  Description: Document David Fournier Fall Risk and appropriate interventions in the flowsheet.   Outcome: Progressing Towards Goal  Note: Fall Risk Interventions:  Mobility Interventions: Patient to call before getting OOB, Utilize walker, cane, or other assistive device         Medication Interventions: Patient to call before getting OOB, Teach patient to arise slowly    Elimination Interventions: Call light in reach, Toilet paper/wipes in reach              Problem: Patient Education: Go to Patient Education Activity  Goal: Patient/Family Education  Outcome: Progressing Towards Goal

## 2021-03-07 NOTE — PROGRESS NOTES
11:59 AM   21     Reason for Admission:  Infected                       RUR Score:  6%                   Plan for utilizing home health:  Possible for wound care         PCP: First and Last name: [facility]     Name of Practice: [facility]    Are you a current patient: Yes/No: Yes   Approximate date of last visit: 2020   Can you participate in a virtual visit with your PCP: Yes                     Current Advanced Directive/Advance Care Plan: Patient does not wish to complete at this time  Full Code       Healthcare Decision Maker:        Patient Mother Tasha Moses is primary [facility] 86 maker [phone]                             Transition of Care Plan:    The patient lives in a one story home with family with 5 steps to enter. The patient has never used [place] services and does not have any DME. The patient was independent with her ADL's and was able to take care of her  son. Her sister and niece are taking care of her son while she is hospitalized . The patient drives and works as a PCA. The patient has prescription coverage and obtains her medications at [facility] in [address] [place] [address]  [phone]. Transition Care Plan   1. Patient will follow up with PCP and specialities   2. Wound Cultures are pending   3. Patient may need [place] services for Wound Care   4.  CM will follow for discharge needs     Care Management Interventions  PCP Verified by CM: Yes([facility] )  Last Visit to PCP: 20  Mode of Transport at Discharge: Self  Transition of Care Consult (CM Consult): Discharge Planning  Discharge Durable Medical Equipment: No  Physical Therapy Consult: No  Occupational Therapy Consult: No  Current Support Network: Relative's Home, Family Lives Nearby  Confirm Follow Up Transport: Family            Nichol Ramos RN CCM

## 2021-03-07 NOTE — PROGRESS NOTES
1950- rec'd report from [address] [place] [address], RN to include julia, kardex, and mar. Pt. Was sitting in bed on phone crying. - pt. ate 100% of dinner and drank 2 ginger ales. Showed me pictures of ; seemingly in better spirits from initial meeting.

## 2021-03-07 NOTE — PROGRESS NOTES
Ms. Lisa Lin admitted for lower abdomen wound infection post . Upon arrival she reported pain and was medicated. She was crying due to the loss of her 2 babies when she delivered. She was carrying triplets, one survived. She reports feeling very sad and down and misses her  at home. Empathized with her, and offered emotional support as well as quiet presence. POC discussed, verbalized understanding.

## 2021-03-07 NOTE — PROGRESS NOTES
Lower Abdomen incision cleaned with NS, pat dry and ordered Antibiotic ointment applied, covered with ABD. Small amount of serous drainage, mild foul odor.

## 2021-03-07 NOTE — PROGRESS NOTES
Gynecology Progress Note    Reece Lobe    Assesment: HD#2, POD#9 after PLTCS, readmitting yesterday with wound abscess. Wound drained yesterday at bedside by Dr. Desi Merrill. Still some erythema and induration superior to incision on pannus, borders marked during dressing change. ~2cm in midportion of wound open, 1.5cm deep, draining mostly serous fluid with some purulent exudate but no olivia pus today. Remains afebrile. Gram stain with gram negative rods, metronidazole added to abx for anaerobic coverage. Plan: Oral pain medications. Continue IV abx pending final results of wound culture. She is without significant complaints. Pain controlled on current medication. Voiding without difficulty. Patient is passing flatus. She is is tolerating her diet. Orders/Charges: High    Vitals:  Visit Vitals  /66 (BP 1 Location: Right upper arm, BP Patient Position: Sitting)   Pulse 97   Temp 98.2 °F (36.8 °C)   Resp 17   Ht 5' 7\" (1.702 m)   Wt 104.3 kg (230 lb)   SpO2 99%   BMI 36.02 kg/m²     Temp (24hrs), Av.3 °F (36.8 °C), Min:97.8 °F (36.6 °C), Max:98.8 °F (37.1 °C)      Last 24hr Input/Output:    Intake/Output Summary (Last 24 hours) at 3/7/2021 1123  Last data filed at 3/7/2021 1014  Gross per 24 hour   Intake 3422 ml   Output 400 ml   Net 3022 ml          Exam:  General: alert, cooperative, no distress, appears stated age     Lung: normal respiratory effort     Heart: regular rate and rhythm     Abdomen: no rebound tenderness, no guarding or rigidity     Incision: Erythema and induration extending superiorly from incision onto pannus for 4-5 cm, borders marked. No fluctuence noted. Moderate serous drainage and minimal purulent exudate without olivia pus from wound. Minimal tenderness.      Extremities: extremities normal, atraumatic, no cyanosis or edema      Labs:   Lab Results   Component Value Date/Time    WBC 22.5 (H) 2021 02:13 AM    WBC 23.7 (H) 2021 07:50 AM    WBC 24.0 (H) 02/28/2021 04:16 AM    WBC 14.8 (H) 02/25/2021 05:08 PM    WBC 15.9 (H) 02/22/2021 12:41 PM    HGB 10.1 (L) 03/07/2021 02:13 AM    HGB 11.3 (L) 03/06/2021 07:50 AM    HGB 10.8 (L) 02/28/2021 04:16 AM    HGB 11.5 02/25/2021 05:08 PM    HGB 12.1 02/22/2021 12:41 PM    HCT 31.4 (L) 03/07/2021 02:13 AM    HCT 34.7 (L) 03/06/2021 07:50 AM    HCT 33.5 (L) 02/28/2021 04:16 AM    HCT 34.9 (L) 02/25/2021 05:08 PM    HCT 36.3 02/22/2021 12:41 PM    PLATELET 523 (H) 86/01/3088 02:13 AM    PLATELET 412 (H) 87/79/9357 07:50 AM    PLATELET 570 05/07/8030 04:16 AM    PLATELET 037 66/13/2965 05:08 PM    PLATELET 489 04/97/1745 12:41 PM    Hgb, External 14.2 08/13/2020    Hct, External 42.2 08/13/2020       Recent Results (from the past 24 hour(s))   CBC WITH AUTOMATED DIFF    Collection Time: 03/07/21  2:13 AM   Result Value Ref Range    WBC 22.5 (H) 3.6 - 11.0 K/uL    RBC 3.45 (L) 3.80 - 5.20 M/uL    HGB 10.1 (L) 11.5 - 16.0 g/dL    HCT 31.4 (L) 35.0 - 47.0 %    MCV 91.0 80.0 - 99.0 FL    MCH 29.3 26.0 - 34.0 PG    MCHC 32.2 30.0 - 36.5 g/dL    RDW 14.2 11.5 - 14.5 %    PLATELET 679 (H) 317 - 400 K/uL    MPV 9.1 8.9 - 12.9 FL    NRBC 0.0 0  WBC    ABSOLUTE NRBC 0.00 0.00 - 0.01 K/uL    NEUTROPHILS 78 (H) 32 - 75 %    BAND NEUTROPHILS 2 0 - 6 %    LYMPHOCYTES 11 (L) 12 - 49 %    MONOCYTES 4 (L) 5 - 13 %    EOSINOPHILS 1 0 - 7 %    BASOPHILS 0 0 - 1 %    METAMYELOCYTES 1 (H) 0 %    MYELOCYTES 3 (H) 0 %    IMMATURE GRANULOCYTES 0 %    ABS. NEUTROPHILS 18.0 (H) 1.8 - 8.0 K/UL    ABS. LYMPHOCYTES 2.5 0.8 - 3.5 K/UL    ABS. MONOCYTES 0.9 0.0 - 1.0 K/UL    ABS. EOSINOPHILS 0.2 0.0 - 0.4 K/UL    ABS. BASOPHILS 0.0 0.0 - 0.1 K/UL    ABS. IMM.  GRANS. 0.0 K/UL    DF MANUAL      RBC COMMENTS NORMOCYTIC, NORMOCHROMIC     CREATININE    Collection Time: 03/07/21  2:13 AM   Result Value Ref Range    Creatinine 0.56 0.55 - 1.02 MG/DL    GFR est AA >60 >60 ml/min/1.73m2    GFR est non-AA >60 >60 ml/min/1.73m2

## 2021-03-07 NOTE — PROGRESS NOTES
[facility] Dosing Services: Antimicrobial Stewardship Progress Note    Automatic antibiotic dosing of Gentamicin and Vancomycin by Dr. Ivan Patel  Pharmacist reviewed antibiotic appropriateness for 37year old, 104 kg, female  for indication of post operative wound infection. Day of Therapy 2/3    Plan:  Vancomycin therapy:  Vancomycin loading dose 2250 mg x 1 given 3/6 followed by 1750 mg IV every 12  hours. Goal trough 15 mcg/ml  Plan for level: Css 20:30   Pharmacy to follow daily and will make changes to dose and/or frequency based on clinical status. Gentamicin therapy:   Current Regimen:  120 mg IV every 8 hours  Adjustment to therapy: 360 mg (~5 mg/kg ABW) IV every 24 hours start next dose  Plan for level:20:30  Other Antimicrobial     Metronidazole 500 mg PO every 12 hours   Cultures     3/6 Blood: NGsf  3/6 Wound: few GNR preliminary   Serum Creatinine     Lab Results   Component Value Date/Time    Creatinine 0.56 03/07/2021 02:13 AM       Creatinine Clearance Estimated Creatinine Clearance: 142 mL/min (by C-G formula based on SCr of 0.56 mg/dL).      Procalcitonin  No results found for: PCT     Temp   98.1 °F (36.7 °C)    WBC   Lab Results   Component Value Date/Time    WBC 22.5 (H) 03/07/2021 02:13 AM       H/H   Lab Results   Component Value Date/Time    HGB 10.1 (L) 03/07/2021 02:13 AM    Hgb, External 14.2 08/13/2020      Platelets Lab Results   Component Value Date/Time    PLATELET 160 (H) 36/59/0996 02:13 AM            Pharmacist: Letty Barton Contact [phone]: [phone]

## 2021-03-08 ENCOUNTER — APPOINTMENT (OUTPATIENT)
Dept: CT IMAGING | Age: 36
DRG: 951 | End: 2021-03-08
Attending: OBSTETRICS & GYNECOLOGY
Payer: MEDICAID

## 2021-03-08 LAB
APPEARANCE UR: ABNORMAL
BACTERIA SPEC CULT: ABNORMAL
BACTERIA URNS QL MICRO: NEGATIVE /HPF
BASOPHILS # BLD: 0.1 K/UL (ref 0–0.1)
BASOPHILS NFR BLD: 0 % (ref 0–1)
BILIRUB UR QL: NEGATIVE
COLOR UR: ABNORMAL
DATE LAST DOSE: NORMAL
DIFFERENTIAL METHOD BLD: ABNORMAL
EOSINOPHIL # BLD: 0.2 K/UL (ref 0–0.4)
EOSINOPHIL NFR BLD: 1 % (ref 0–7)
EPITH CASTS URNS QL MICRO: ABNORMAL /LPF
ERYTHROCYTE [DISTWIDTH] IN BLOOD BY AUTOMATED COUNT: 14.1 % (ref 11.5–14.5)
GENTAMICIN SERPL-MCNC: 1.6 UG/ML
GLUCOSE UR STRIP.AUTO-MCNC: NEGATIVE MG/DL
GRAM STN SPEC: ABNORMAL
GRAM STN SPEC: ABNORMAL
HCT VFR BLD AUTO: 31.1 % (ref 35–47)
HGB BLD-MCNC: 9.8 G/DL (ref 11.5–16)
HGB UR QL STRIP: ABNORMAL
IMM GRANULOCYTES # BLD AUTO: 0.4 K/UL (ref 0–0.04)
IMM GRANULOCYTES NFR BLD AUTO: 2 % (ref 0–0.5)
KETONES UR QL STRIP.AUTO: NEGATIVE MG/DL
LEUKOCYTE ESTERASE UR QL STRIP.AUTO: ABNORMAL
LYMPHOCYTES # BLD: 1.5 K/UL (ref 0.8–3.5)
LYMPHOCYTES NFR BLD: 6 % (ref 12–49)
MCH RBC QN AUTO: 28.2 PG (ref 26–34)
MCHC RBC AUTO-ENTMCNC: 31.5 G/DL (ref 30–36.5)
MCV RBC AUTO: 89.6 FL (ref 80–99)
MONOCYTES # BLD: 1.4 K/UL (ref 0–1)
MONOCYTES NFR BLD: 6 % (ref 5–13)
NEUTS SEG # BLD: 19.5 K/UL (ref 1.8–8)
NEUTS SEG NFR BLD: 85 % (ref 32–75)
NITRITE UR QL STRIP.AUTO: NEGATIVE
NRBC # BLD: 0 K/UL (ref 0–0.01)
NRBC BLD-RTO: 0 PER 100 WBC
PH UR STRIP: 6.5 [PH] (ref 5–8)
PLATELET # BLD AUTO: 555 K/UL (ref 150–400)
PMV BLD AUTO: 8.9 FL (ref 8.9–12.9)
PROT UR STRIP-MCNC: NEGATIVE MG/DL
RBC # BLD AUTO: 3.47 M/UL (ref 3.8–5.2)
RBC #/AREA URNS HPF: ABNORMAL /HPF (ref 0–5)
REPORTED DOSE,DOSE: NORMAL UNITS
REPORTED DOSE/TIME,TMG: NORMAL
SERVICE CMNT-IMP: ABNORMAL
SP GR UR REFRACTOMETRY: 1 (ref 1–1.03)
UROBILINOGEN UR QL STRIP.AUTO: 0.2 EU/DL (ref 0.2–1)
WBC # BLD AUTO: 23 K/UL (ref 3.6–11)
WBC URNS QL MICRO: ABNORMAL /HPF (ref 0–4)

## 2021-03-08 PROCEDURE — 10030 IMG GID FLU COLL DRG SFT TIS: CPT

## 2021-03-08 PROCEDURE — 74011250636 HC RX REV CODE- 250/636: Performed by: RADIOLOGY

## 2021-03-08 PROCEDURE — 74011250637 HC RX REV CODE- 250/637: Performed by: OBSTETRICS & GYNECOLOGY

## 2021-03-08 PROCEDURE — 74011000258 HC RX REV CODE- 258: Performed by: OBSTETRICS & GYNECOLOGY

## 2021-03-08 PROCEDURE — 99152 MOD SED SAME PHYS/QHP 5/>YRS: CPT

## 2021-03-08 PROCEDURE — C1729 CATH, DRAINAGE: HCPCS

## 2021-03-08 PROCEDURE — 81001 URINALYSIS AUTO W/SCOPE: CPT

## 2021-03-08 PROCEDURE — 87086 URINE CULTURE/COLONY COUNT: CPT

## 2021-03-08 PROCEDURE — 74011000250 HC RX REV CODE- 250: Performed by: OBSTETRICS & GYNECOLOGY

## 2021-03-08 PROCEDURE — 74011250636 HC RX REV CODE- 250/636: Performed by: OBSTETRICS & GYNECOLOGY

## 2021-03-08 PROCEDURE — 77030010546 HC BG URIN DRNG URES -B

## 2021-03-08 PROCEDURE — 36415 COLL VENOUS BLD VENIPUNCTURE: CPT

## 2021-03-08 PROCEDURE — 85025 COMPLETE CBC W/AUTO DIFF WBC: CPT

## 2021-03-08 PROCEDURE — 65270000029 HC RM PRIVATE

## 2021-03-08 PROCEDURE — 0JD80ZZ EXTRACTION OF ABDOMEN SUBCUTANEOUS TISSUE AND FASCIA, OPEN APPROACH: ICD-10-PCS | Performed by: OBSTETRICS & GYNECOLOGY

## 2021-03-08 PROCEDURE — 87075 CULTR BACTERIA EXCEPT BLOOD: CPT

## 2021-03-08 PROCEDURE — 87205 SMEAR GRAM STAIN: CPT

## 2021-03-08 PROCEDURE — 99153 MOD SED SAME PHYS/QHP EA: CPT

## 2021-03-08 PROCEDURE — 0W9J30Z DRAINAGE OF PELVIC CAVITY WITH DRAINAGE DEVICE, PERCUTANEOUS APPROACH: ICD-10-PCS | Performed by: RADIOLOGY

## 2021-03-08 PROCEDURE — 77030003526 HC NDL BLNT PERC MDT -B

## 2021-03-08 PROCEDURE — 87185 SC STD ENZYME DETCJ PER NZM: CPT

## 2021-03-08 RX ORDER — MIDAZOLAM HYDROCHLORIDE 1 MG/ML
5 INJECTION, SOLUTION INTRAMUSCULAR; INTRAVENOUS
Status: DISCONTINUED | OUTPATIENT
Start: 2021-03-08 | End: 2021-03-08 | Stop reason: ALTCHOICE

## 2021-03-08 RX ORDER — FENTANYL CITRATE 50 UG/ML
100 INJECTION, SOLUTION INTRAMUSCULAR; INTRAVENOUS
Status: DISCONTINUED | OUTPATIENT
Start: 2021-03-08 | End: 2021-03-08 | Stop reason: ALTCHOICE

## 2021-03-08 RX ORDER — VANCOMYCIN/0.9 % SOD CHLORIDE 1.5G/250ML
1500 PLASTIC BAG, INJECTION (ML) INTRAVENOUS EVERY 8 HOURS
Status: DISCONTINUED | OUTPATIENT
Start: 2021-03-08 | End: 2021-03-08

## 2021-03-08 RX ORDER — METRONIDAZOLE 500 MG/100ML
500 INJECTION, SOLUTION INTRAVENOUS EVERY 12 HOURS
Status: DISCONTINUED | OUTPATIENT
Start: 2021-03-08 | End: 2021-03-11

## 2021-03-08 RX ADMIN — MIDAZOLAM HYDROCHLORIDE 1 MG: 1 INJECTION, SOLUTION INTRAMUSCULAR; INTRAVENOUS at 13:58

## 2021-03-08 RX ADMIN — ACETAMINOPHEN 650 MG: 325 TABLET ORAL at 08:10

## 2021-03-08 RX ADMIN — ONDANSETRON 4 MG: 2 INJECTION INTRAMUSCULAR; INTRAVENOUS at 08:02

## 2021-03-08 RX ADMIN — CEFEPIME HYDROCHLORIDE 2 G: 2 INJECTION, POWDER, FOR SOLUTION INTRAVENOUS at 17:31

## 2021-03-08 RX ADMIN — ONDANSETRON 4 MG: 2 INJECTION INTRAMUSCULAR; INTRAVENOUS at 01:54

## 2021-03-08 RX ADMIN — METRONIDAZOLE 500 MG: 500 INJECTION, SOLUTION INTRAVENOUS at 17:32

## 2021-03-08 RX ADMIN — MIDAZOLAM HYDROCHLORIDE 1 MG: 1 INJECTION, SOLUTION INTRAMUSCULAR; INTRAVENOUS at 13:45

## 2021-03-08 RX ADMIN — FAMOTIDINE 20 MG: 20 TABLET ORAL at 08:10

## 2021-03-08 RX ADMIN — MIDAZOLAM HYDROCHLORIDE 1 MG: 1 INJECTION, SOLUTION INTRAMUSCULAR; INTRAVENOUS at 13:38

## 2021-03-08 RX ADMIN — ONDANSETRON 4 MG: 2 INJECTION INTRAMUSCULAR; INTRAVENOUS at 21:41

## 2021-03-08 RX ADMIN — MIDAZOLAM HYDROCHLORIDE 1 MG: 1 INJECTION, SOLUTION INTRAMUSCULAR; INTRAVENOUS at 14:00

## 2021-03-08 RX ADMIN — Medication 10 ML: at 21:33

## 2021-03-08 RX ADMIN — BACITRACIN, NEOMYCIN, POLYMYXIN B 1 PACKET: 400; 3.5; 5 OINTMENT TOPICAL at 08:10

## 2021-03-08 RX ADMIN — MIDAZOLAM HYDROCHLORIDE 1 MG: 1 INJECTION, SOLUTION INTRAMUSCULAR; INTRAVENOUS at 14:10

## 2021-03-08 RX ADMIN — MIDAZOLAM HYDROCHLORIDE 1 MG: 1 INJECTION, SOLUTION INTRAMUSCULAR; INTRAVENOUS at 13:55

## 2021-03-08 RX ADMIN — FENTANYL CITRATE 25 MCG: 50 INJECTION, SOLUTION INTRAMUSCULAR; INTRAVENOUS at 13:55

## 2021-03-08 RX ADMIN — VANCOMYCIN HYDROCHLORIDE 1500 MG: 10 INJECTION, POWDER, LYOPHILIZED, FOR SOLUTION INTRAVENOUS at 08:16

## 2021-03-08 RX ADMIN — CITALOPRAM HYDROBROMIDE 10 MG: 20 TABLET ORAL at 08:10

## 2021-03-08 RX ADMIN — OXYCODONE AND ACETAMINOPHEN 2 TABLET: 7.5; 325 TABLET ORAL at 02:02

## 2021-03-08 RX ADMIN — FENTANYL CITRATE 25 MCG: 50 INJECTION, SOLUTION INTRAMUSCULAR; INTRAVENOUS at 13:45

## 2021-03-08 RX ADMIN — GENTAMICIN SULFATE 360 MG: 40 INJECTION, SOLUTION INTRAMUSCULAR; INTRAVENOUS at 11:02

## 2021-03-08 RX ADMIN — OXYCODONE HYDROCHLORIDE AND ACETAMINOPHEN 2 TABLET: 5; 325 TABLET ORAL at 17:32

## 2021-03-08 RX ADMIN — OXYCODONE AND ACETAMINOPHEN 2 TABLET: 7.5; 325 TABLET ORAL at 08:02

## 2021-03-08 RX ADMIN — FENTANYL CITRATE 25 MCG: 50 INJECTION, SOLUTION INTRAMUSCULAR; INTRAVENOUS at 13:38

## 2021-03-08 RX ADMIN — MIDAZOLAM HYDROCHLORIDE 1 MG: 1 INJECTION, SOLUTION INTRAMUSCULAR; INTRAVENOUS at 13:49

## 2021-03-08 RX ADMIN — ZOLPIDEM TARTRATE 5 MG: 5 TABLET ORAL at 02:07

## 2021-03-08 RX ADMIN — METRONIDAZOLE 500 MG: 250 TABLET ORAL at 08:10

## 2021-03-08 RX ADMIN — OXYCODONE AND ACETAMINOPHEN 2 TABLET: 7.5; 325 TABLET ORAL at 21:41

## 2021-03-08 RX ADMIN — ENOXAPARIN SODIUM 40 MG: 40 INJECTION SUBCUTANEOUS at 19:54

## 2021-03-08 RX ADMIN — ONDANSETRON 4 MG: 2 INJECTION INTRAMUSCULAR; INTRAVENOUS at 17:32

## 2021-03-08 RX ADMIN — FENTANYL CITRATE 25 MCG: 50 INJECTION, SOLUTION INTRAMUSCULAR; INTRAVENOUS at 14:00

## 2021-03-08 RX ADMIN — FENTANYL CITRATE 25 MCG: 50 INJECTION, SOLUTION INTRAMUSCULAR; INTRAVENOUS at 13:49

## 2021-03-08 NOTE — PROGRESS NOTES
Called to see pt for increase in temperature to 101.5  Pt admitted with  wound infection and had leukocytosis but no fever until tonight. Initially 100cc of purulent drainage drained from wound, with serous drainage noted today. Erythema marked with ink. States she is having incisional pain but otherwise feels ok. 101.5  Abd: obese soft, tender to palpation around incision with superficial skin edema, induration, and erythema. No extension noted past marking from this AM.  incision with 2 cm long  and shallow opening, no evidence of pus. Small serosanguinous drainage noted with exudate on granulating tissue noted. Lungs CTA  CV: mildy tachy, S1S2       A/ s/p  from  admitted last night with wound infection on gent, amp, and flagyl (PCN allergic). Now symptomatic with fever. P/ BCX2, lactic acid, CBC. Tylenol for fever.

## 2021-03-08 NOTE — CALL BACK NOTE
Received results of initial wound clt: light growth e-coli    Antibiotic change: stop Vanc and [employer] Cefepime 2gm q8                                 Change Flagyl to IV       Pt seen post IR drainage of intra-abdominal abscess this PM  Pt alert and oriented. Pain controlled  Incision: packed w/o significant drainage  Abdominal wall erythema above incision improving  Drain: large amount of light brown maloderous fluid. 230cc removed in radiology per RN and bag now 1/2 full    Urine cont. W/ odour: we'll send UA and urine clt.

## 2021-03-08 NOTE — PROGRESS NOTES
[facility] Dosing Services:      Consult for Vancomycin Dosing by Pharmacy by Dr. Sameera Yo wound infection   Day of Therapy 3      Previous Regimen 1.75 grams every 12 hours   Last Level 10.2 at 2130 3/7   Other Current Antibiotics Flagyl, Gentamicin   Serum Creatinine Lab Results   Component Value Date/Time    Creatinine 0.56 03/07/2021 02:13 AM       Creatinine Clearance Estimated Creatinine Clearance: 142 mL/min (by C-G formula based on SCr of 0.56 mg/dL). BUN Lab Results   Component Value Date/Time    BUN 12 03/06/2021 07:50 AM       WBC Lab Results   Component Value Date/Time    WBC 25.3 (H) 03/07/2021 08:10 PM          Dose administration notes:   Doses given appropriately as scheduled    Adjustments/Plan: Trough is subtherapeutic. Will change dosing to 1.5 grams every 8 hours for predicted trough of ~15. Will re-check trough level prior to midnight dose. Will continue to follow.   Jasson Nichole, PharmD BCPS

## 2021-03-08 NOTE — PROGRESS NOTES
3/8/2021  10:37 AM  RUR:  9%  Risk Level: [x]Low []Moderate []High  Value-based purchasing: [] Yes [x] No  Bundle patient: [] Yes [x] No   Specify:     Transition of care plan:  1. Awaiting medical clearance and DC order. Wound care consulted. 2. TBD - Home with HH vs none. 3. Outpatient follow-up. 4. Pt's family to transport.

## 2021-03-08 NOTE — WOUND CARE
Wound Consult: spoke to Dr. Martha Reno after reading of latest bedside debridement; she reported on wound/skin redness extending; patient going for drain today. Plan: Will reach out to Dr. Rafael Marshall tomorrow a.m to examine patient together.   Wound recently packed by MD. Ozzy Gamez, RN,cWCN

## 2021-03-08 NOTE — PROGRESS NOTES
Gynecology Progress Note    Rosa Troncoso    Assesment: Active Problems:    Wound infection following  section, postpartum (3/6/2021)   -- POD # 10, s/p LTCS for FTP   --HD #3 for admission for wound infection and intra-abdominal abscess     Day 3 of Vanc     Day 2 of Gentamycin and Flagyl  --wound clt: GN rods, final pending    Temp spike last evening 101.4,  w. BL clt done, NGSF  Temp spike again this AM to 101.7          Plan:   --wound opened to 3cm at bedside this am with return of copious amount of purulent , maloderous dc. Panniculus debrided w/ scalpel (pt tolerated well) to healthy appearing fatty tissue. Fascia probed w/ q -tip , palpated intact. Copious irrigation w/ NaCL done and wet to dry dressing w/ sterile 4x4 done. The wound tunnels to the corner of incision on pt's right , but only minimal tunneling on pt's left. --recommend wound care consult and wet/dry packing changes at least q shift for now till improvement of abdominal wall cellulitis and induration. --CT scan reviewed w/ radiologist and will schedule CT guided drainage of intra-abdominal portion of abscess today  --NPO till after procedure (d/w nurse)  --cont. Vanc/Gent/ Flagyl (Addendum: 3pm: wound clt : light e-coli growth, we'll stop vanc and change to Cefipenem and Flagyl IV)  --CBC in AM  --pt is bottle feeding , sad about being away from baby but no signs of depression at this time      She is without significant complaints. Pain controlled on current medication. Voiding without difficulty, c/o maloderous urine , but declines freq/urg/dysuria. Patient is passing flatus. She is is tolerating her diet.     Orders/Charges: High    Current Facility-Administered Medications   Medication Dose Route Frequency    vancomycin (VANCOCIN) 1500 mg in  ml infusion  1,500 mg IntraVENous Q8H    metroNIDAZOLE (FLAGYL) tablet 500 mg  500 mg Oral Q12H    gentamicin (GARAMYCIN) 360 mg in 0.9% sodium chloride 100 mL IVPB  360 mg IntraVENous Q24H    citalopram (CELEXA) tablet 10 mg  10 mg Oral DAILY    enoxaparin (LOVENOX) injection 40 mg  40 mg SubCUTAneous Q24H    prenatal vit-iron fumarate-fa (PRENATAL PLUS with IRON) tablet 1 Tab  1 Tab Oral DAILY    famotidine (PEPCID) tablet 20 mg  20 mg Oral DAILY    neomycin-bacitracnZn-polymyxnB (NEOSPORIN) ointment 1 Packet  1 Packet Topical BID    sodium chloride (NS) flush 5-40 mL  5-40 mL IntraVENous Q8H    lactated Ringers infusion  100 mL/hr IntraVENous CONTINUOUS       Vitals:  Visit Vitals  /87   Pulse (!) 115   Temp (!) 101.7 °F (38.7 °C)   Resp 17   Ht 5' 7\" (1.702 m)   Wt 104.3 kg (230 lb)   SpO2 99%   BMI 36.02 kg/m²     Temp (24hrs), Av.6 °F (37.6 °C), Min:98.1 °F (36.7 °C), Max:101.7 °F (38.7 °C)      Last 24hr Input/Output:    Intake/Output Summary (Last 24 hours) at 3/8/2021 1001  Last data filed at 3/8/2021 6548  Gross per 24 hour   Intake 835 ml   Output 300 ml   Net 535 ml          Exam:  General: alert, cooperative, appears stated age     Lung: clear to auscultation bilaterally, no CVAT     Heart: tachycardic, S1, S2 normal, no murmur, click, rub or gallop     Abdomen: lower abdominal wall erythema extending past marked line from 3/7 and spreading laterally. Extensive induration 2-3 cm immediately above incision in the midline extending about 4cm laterally. No erythema or induration below incision     Extremities: extremities normal, atraumatic, no cyanosis or edema    Procedure note: Pt was placed in supine position. Wound was open about 1cm in midline . Scissors were used to cut the sub-c stitch and the incision was opened w/ scalpel for a length of about 8cm. Probing w/ sterile q-tip was done and copious irrigation. The lower border has healthy sub-c tissue, the upper border was debrided w/ scalpel and pickups w/ adequate bedside pain control. Following irrigation again, the wound was packed w/ wet 4x4 and covered w/ dry 4x4 and ABD.         Labs:   Lab Results   Component Value Date/Time    WBC 23.0 (H) 03/08/2021 04:38 AM    WBC 25.3 (H) 03/07/2021 08:10 PM    WBC 22.5 (H) 03/07/2021 02:13 AM    HGB 9.8 (L) 03/08/2021 04:38 AM    HGB 10.1 (L) 03/07/2021 02:13 AM    HGB 11.3 (L) 03/06/2021 07:50 AM    HCT 31.1 (L) 03/08/2021 04:38 AM    HCT 31.4 (L) 03/07/2021 02:13 AM    HCT 34.7 (L) 03/06/2021 07:50 AM    PLATELET 387 (H) 18/57/3890 04:38 AM    PLATELET 681 (H) 44/64/0586 02:13 AM    PLATELET 108 (H) 62/74/6849 07:50 AM    Hgb, External 14.2 08/13/2020    Hct, External 42.2 08/13/2020       Recent Results (from the past 24 hour(s))   CULTURE, BLOOD, PAIRED    Collection Time: 03/07/21  8:10 PM    Specimen: Blood   Result Value Ref Range    Special Requests: NO SPECIAL REQUESTS      Culture result: NO GROWTH AFTER 8 HOURS     LACTIC ACID    Collection Time: 03/07/21  8:10 PM   Result Value Ref Range    Lactic acid 1.0 0.4 - 2.0 MMOL/L   WBC COUNT    Collection Time: 03/07/21  8:10 PM   Result Value Ref Range    WBC 25.3 (H) 3.6 - 11.0 K/uL   VANCOMYCIN, TROUGH    Collection Time: 03/07/21  9:30 PM   Result Value Ref Range    Vancomycin,trough 10.2 (H) 5.0 - 10.0 ug/mL    Reported dose date NOT PROVIDED      Reported dose time: NOT PROVIDED      Reported dose: NOT PROVIDED UNITS   GENTAMICIN, RANDOM    Collection Time: 03/07/21  9:30 PM   Result Value Ref Range    Gentamicin,random 1.6 ug/ml    Reported dose date NOT PROVIDED      Reported dose time: NOT PROVIDED      Reported dose: NOT PROVIDED UNITS   CBC WITH AUTOMATED DIFF    Collection Time: 03/08/21  4:38 AM   Result Value Ref Range    WBC 23.0 (H) 3.6 - 11.0 K/uL    RBC 3.47 (L) 3.80 - 5.20 M/uL    HGB 9.8 (L) 11.5 - 16.0 g/dL    HCT 31.1 (L) 35.0 - 47.0 %    MCV 89.6 80.0 - 99.0 FL    MCH 28.2 26.0 - 34.0 PG    MCHC 31.5 30.0 - 36.5 g/dL    RDW 14.1 11.5 - 14.5 %    PLATELET 695 (H) 704 - 400 K/uL    MPV 8.9 8.9 - 12.9 FL    NRBC 0.0 0  WBC    ABSOLUTE NRBC 0.00 0.00 - 0.01 K/uL    NEUTROPHILS 85 (H) 32 - 75 %    LYMPHOCYTES 6 (L) 12 - 49 %    MONOCYTES 6 5 - 13 %    EOSINOPHILS 1 0 - 7 %    BASOPHILS 0 0 - 1 %    IMMATURE GRANULOCYTES 2 (H) 0.0 - 0.5 %    ABS. NEUTROPHILS 19.5 (H) 1.8 - 8.0 K/UL    ABS. LYMPHOCYTES 1.5 0.8 - 3.5 K/UL    ABS. MONOCYTES 1.4 (H) 0.0 - 1.0 K/UL    ABS. EOSINOPHILS 0.2 0.0 - 0.4 K/UL    ABS. BASOPHILS 0.1 0.0 - 0.1 K/UL    ABS. IMM.  GRANS. 0.4 (H) 0.00 - 0.04 K/UL    DF AUTOMATED

## 2021-03-08 NOTE — H&P
Interventional and Vascular Radiology History and Physical    Patient: Mariah Gray 37 y.o. female       Chief Complaint: Post OP Complication      History of Present Illness: pelvic abscess    History:    Past Medical History:   Diagnosis Date    Anemia     receiving IV iron last dose in 5/2020    Asthma     inhaler PRN    Epilepsy ([facility] [address]     2014    Essential hypertension     Gestational diabetes     Heart abnormality     mild heart murmur no medications required    Herpes simplex virus (HSV) infection     Infertility, female     Liver disease     Polycystic disease, ovaries     Psychiatric problem     depression     Family History   Problem Relation Age of Onset    Hypertension Mother     Diabetes Paternal Uncle     Cancer Paternal Grandmother     Stroke Paternal Grandmother     Alcohol abuse Paternal Grandfather      Social History     Socioeconomic History    Marital status: SINGLE     Spouse name: Not on file    Number of children: Not on file    Years of education: Not on file    Highest education level: High school graduate   Occupational History    Occupation: PCA   Social Needs    Financial resource strain: Not very hard    Food insecurity     Worry: Never true     Inability: Never true    Transportation needs     Medical: No     Non-medical: No   Tobacco Use    Smoking status: Former Smoker    Smokeless tobacco: Never Used   Substance and Sexual Activity    Alcohol use: Never     Frequency: Never    Drug use: Yes     Frequency: 7.0 times per week     Types: Marijuana     Comment: medically purposes due to PTSD    Sexual activity: Not on file   Lifestyle    Physical activity     Days per week: Not on file     Minutes per session: Not on file    Stress: Not on file   Relationships    Social connections     Talks on phone: Not on file     Gets together: Not on file     Attends Quaker service: Not on file     Active member of club or organization: Not on file Attends meetings of clubs or organizations: Not on file     Relationship status: Not on file    Intimate partner violence     Fear of current or ex partner: Not on file     Emotionally abused: Not on file     Physically abused: Not on file     Forced sexual activity: Not on file   Other Topics Concern     Service Not Asked    Blood Transfusions Not Asked    Caffeine Concern Not Asked    Occupational Exposure Not Asked   Amrita [place] Hazards Not Asked    Sleep Concern Not Asked    Stress Concern Not Asked    Weight Concern Not Asked    Special Diet Not Asked    Back Care Not Asked    Exercise Not Asked    Bike Helmet Not Asked   [address] [facility] [facility] Not Asked    Self-Exams Not Asked   Social History Narrative    Not on file       Allergies:    Allergies   Allergen Reactions    Latex Rash    Amoxicillin Rash and Swelling    Bactrim [Sulfamethoprim] Rash, Itching and Swelling    Pcn [Penicillins] Rash and Swelling    Sulfa (Sulfonamide Antibiotics) Rash and Swelling       Current Medications:  Current Facility-Administered Medications   Medication Dose Route Frequency    vancomycin (VANCOCIN) 1500 mg in  ml infusion  1,500 mg IntraVENous Q8H    midazolam (VERSED) injection 5 mg  5 mg IntraVENous RAD PRN    fentaNYL citrate (PF) injection 100 mcg  100 mcg IntraVENous RAD PRN    metroNIDAZOLE (FLAGYL) tablet 500 mg  500 mg Oral Q12H    gentamicin (GARAMYCIN) 360 mg in 0.9% sodium chloride 100 mL IVPB  360 mg IntraVENous Q24H    citalopram (CELEXA) tablet 10 mg  10 mg Oral DAILY    enoxaparin (LOVENOX) injection 40 mg  40 mg SubCUTAneous Q24H    prenatal vit-iron fumarate-fa (PRENATAL PLUS with IRON) tablet 1 Tab  1 Tab Oral DAILY    famotidine (PEPCID) tablet 20 mg  20 mg Oral DAILY    acetaminophen (TYLENOL) tablet 650 mg  650 mg Oral Q6H PRN    neomycin-bacitracnZn-polymyxnB (NEOSPORIN) ointment 1 Packet  1 Packet Topical BID    sodium chloride (NS) flush 5-40 mL  5-40 mL IntraVENous Q8H  sodium chloride (NS) flush 5-40 mL  5-40 mL IntraVENous PRN    lactated Ringers infusion  100 mL/hr IntraVENous CONTINUOUS    oxyCODONE-acetaminophen (PERCOCET) 5-325 mg per tablet 2 Tab  2 Tab Oral Q4H PRN    oxyCODONE-acetaminophen (PERCOCET 7.5) 7.5-325 mg per tablet 2 Tab  2 Tab Oral Q6H PRN    naloxone (NARCAN) injection 0.4 mg  0.4 mg IntraVENous PRN    ondansetron (ZOFRAN) injection 4 mg  4 mg IntraVENous Q4H PRN    diphenhydrAMINE (BENADRYL) injection 12.5 mg  12.5 mg IntraVENous Q4H PRN    zolpidem (AMBIEN) tablet 5 mg  5 mg Oral QHS PRN        Physical Exam:  Blood pressure 120/75, pulse 100, temperature 98.1 °F (36.7 °C), resp. rate 11, height 5' 7\" (1.702 m), weight 104.3 kg (230 lb), SpO2 98 %, unknown if currently breastfeeding. LUNG: clear to auscultation bilaterally, HEART: regular rate and rhythm, S1, S2 normal, no murmur, click, rub or gallop      Alerts:    Hospital Problems  Date Reviewed: 2021          Codes Class Noted POA    Wound infection following  section, postpartum ICD-10-CM: O86.00  ICD-9-CM: 674.34  3/6/2021 Unknown              Laboratory:      Recent Labs     21  0438 21  0213 21  0213 21  0750   HGB 9.8*  --  10.1* 11.3*   HCT 31.1*  --  31.4* 34.7*   WBC 23.0*   < > 22.5* 23.7*   *  --  539* 554*   BUN  --   --   --  12   CREA  --   --  0.56 0.66   K  --   --   --  3.0*    < > = values in this interval not displayed. Plan of Care/Planned Procedure:  Risks, benefits, and alternatives reviewed with patient and she agrees to proceed with the procedure. Conscious sedation will be performed with IV fentanyl and versed.  Plan is for ct guided pelvic drainage       Donis Rendon MD

## 2021-03-08 NOTE — PROGRESS NOTES
Physician Progress Note      Michael Urrutia  CSN #:                  [id number]  :                       1985  ADMIT DATE:       3/6/2021 7:21 AM  DISCH DATE:  RESPONDING  PROVIDER #:        Yfn Guzman MD          QUERY TEXT:    Pt admitted with a Wound infection following  section with abscess and  Pt noted to have on admission leukocytosis, elevated bands and tachycardia. If possible, please document in the progress notes and discharge summary if you are evaluating and /or treating any of the following: The medical record reflects the following:  Risk Factors: 38 yo F admitted with a Postop  wound abscess with abdominal wall cellulitis  Clinical Indicators: On admission: WBC 23.7 Bands 15 Pulse 109  Treatment: IV gentamycin IV Flagyl, IV Maxipime, debridement of wound  Options provided:  -- Sepsis POA 2/2 Postop  wound abscess with abdominal wall cellulitis  -- No Sepsis,  Postop  wound abscess with abdominal wall cellulitis Only  -- Other - I will add my own diagnosis  -- Disagree - Not applicable / Not valid  -- Disagree - Clinically unable to determine / Unknown  -- Refer to Clinical Documentation Reviewer    PROVIDER RESPONSE TEXT:    No Sepsis, Postop  wound abscess with abdominal wall cellulitis Only. Query created by: Jono Summers on 3/8/2021 10:42 AM      QUERY TEXT:    Patient admitted with Postop  wound abscess and abdominal wall cellulitis. Per  progress note dated 3/8/21 documentation of debridement of  Panniculus noted. To accurately reflect the procedure performed please document if debridement was excisional or nonexcisional and the deepest depth of tissue removed as down to and including: The medical record reflects the following:  Risk Factors: 38 yo F admitted with a Postop  wound abscess and abdominal wall cellulitis.   Clinical Indicators: Noted in the 3/8/21 progress notes \"wound opened to 3cm at bedside this am with return of copious amount of purulent , maloderous dc. Panniculus debrided w/ scalpel (pt tolerated well) to healthy appearing fatty tissue. Fascia probed w/ q -tip , palpated intact. Copious irrigation w/ NaCL \"  Treatment: CT guided drainage of intra-abdominal portion of abscess ordered, IV  Vanc/Gent/ Flagyl  Options provided:  -- Nonexcisional debridement of skin  -- Excisional debridement of skin  -- Nonexcisional debridement of subcutaneous tissue  -- Excisional debridement of subcutaneous tissue  -- Nonexcisional debridement of fascia  -- Excisional debridement of fascia  -- Other - I will add my own diagnosis  -- Disagree - Not applicable / Not valid  -- Disagree - Clinically unable to determine / Unknown  -- Refer to Clinical Documentation Reviewer    PROVIDER RESPONSE TEXT:    Non-excisional debridement of subcutaneous tissue of panniculus was performed during procedure on 3/8/21.     Query created by: Regina Goldsmith on 3/8/2021 10:49 AM      Electronically signed by:  Saloni Pickens MD 3/8/2021 2:31 PM

## 2021-03-08 NOTE — PROGRESS NOTES
Chart reviewed for possible drain placement in radiology today. Pt last dose Lovenox 40 at 2143 3/7/21; Pt is not NPO. Will discuss with pt's nurse. 1100 Unable to speak to pt's nurse. Spoke to [facility] nurse who spoke to pt. Pt states she has not eaten food this AM.   Per Dr. Sriram Clifford Radiologist will plan to do drain after 1230. CT made aware.

## 2021-03-09 LAB
BACTERIA SPEC CULT: NORMAL
BASOPHILS # BLD: 0 K/UL (ref 0–0.1)
BASOPHILS NFR BLD: 0 % (ref 0–1)
COMMENT, HOLDF: NORMAL
DIFFERENTIAL METHOD BLD: ABNORMAL
EOSINOPHIL # BLD: 0.2 K/UL (ref 0–0.4)
EOSINOPHIL NFR BLD: 1 % (ref 0–7)
ERYTHROCYTE [DISTWIDTH] IN BLOOD BY AUTOMATED COUNT: 14 % (ref 11.5–14.5)
HCT VFR BLD AUTO: 30.6 % (ref 35–47)
HGB BLD-MCNC: 9.8 G/DL (ref 11.5–16)
IMM GRANULOCYTES # BLD AUTO: 0 K/UL
IMM GRANULOCYTES NFR BLD AUTO: 0 %
LYMPHOCYTES # BLD: 2.7 K/UL (ref 0.8–3.5)
LYMPHOCYTES NFR BLD: 14 % (ref 12–49)
MCH RBC QN AUTO: 29.3 PG (ref 26–34)
MCHC RBC AUTO-ENTMCNC: 32 G/DL (ref 30–36.5)
MCV RBC AUTO: 91.3 FL (ref 80–99)
MONOCYTES # BLD: 1.3 K/UL (ref 0–1)
MONOCYTES NFR BLD: 7 % (ref 5–13)
MYELOCYTES NFR BLD MANUAL: 2 %
NEUTS BAND NFR BLD MANUAL: 6 % (ref 0–6)
NEUTS SEG # BLD: 14.6 K/UL (ref 1.8–8)
NEUTS SEG NFR BLD: 70 % (ref 32–75)
NRBC # BLD: 0 K/UL (ref 0–0.01)
NRBC BLD-RTO: 0 PER 100 WBC
PLATELET # BLD AUTO: 602 K/UL (ref 150–400)
PMV BLD AUTO: 8.8 FL (ref 8.9–12.9)
RBC # BLD AUTO: 3.35 M/UL (ref 3.8–5.2)
RBC MORPH BLD: ABNORMAL
SAMPLES BEING HELD,HOLD: NORMAL
SERVICE CMNT-IMP: NORMAL
WBC # BLD AUTO: 19.2 K/UL (ref 3.6–11)
WBC MORPH BLD: ABNORMAL

## 2021-03-09 PROCEDURE — 74011250636 HC RX REV CODE- 250/636: Performed by: OBSTETRICS & GYNECOLOGY

## 2021-03-09 PROCEDURE — 74011000250 HC RX REV CODE- 250: Performed by: OBSTETRICS & GYNECOLOGY

## 2021-03-09 PROCEDURE — 65270000029 HC RM PRIVATE

## 2021-03-09 PROCEDURE — 85025 COMPLETE CBC W/AUTO DIFF WBC: CPT

## 2021-03-09 PROCEDURE — 74011250637 HC RX REV CODE- 250/637: Performed by: OBSTETRICS & GYNECOLOGY

## 2021-03-09 PROCEDURE — 36415 COLL VENOUS BLD VENIPUNCTURE: CPT

## 2021-03-09 RX ORDER — FENTANYL CITRATE 50 UG/ML
100 INJECTION, SOLUTION INTRAMUSCULAR; INTRAVENOUS
Status: DISCONTINUED | OUTPATIENT
Start: 2021-03-09 | End: 2021-03-13 | Stop reason: HOSPADM

## 2021-03-09 RX ADMIN — CEFEPIME HYDROCHLORIDE 2 G: 2 INJECTION, POWDER, FOR SOLUTION INTRAVENOUS at 09:26

## 2021-03-09 RX ADMIN — CEFEPIME HYDROCHLORIDE 2 G: 2 INJECTION, POWDER, FOR SOLUTION INTRAVENOUS at 17:22

## 2021-03-09 RX ADMIN — DAKIN'S SOLUTION 0.125% (QUARTER STRENGTH): 0.12 SOLUTION at 12:01

## 2021-03-09 RX ADMIN — Medication 10 ML: at 06:17

## 2021-03-09 RX ADMIN — METRONIDAZOLE 500 MG: 500 INJECTION, SOLUTION INTRAVENOUS at 20:19

## 2021-03-09 RX ADMIN — ZOLPIDEM TARTRATE 5 MG: 5 TABLET ORAL at 22:58

## 2021-03-09 RX ADMIN — DAKIN'S SOLUTION 0.125% (QUARTER STRENGTH): 0.12 SOLUTION at 20:59

## 2021-03-09 RX ADMIN — METRONIDAZOLE 500 MG: 500 INJECTION, SOLUTION INTRAVENOUS at 09:33

## 2021-03-09 RX ADMIN — OXYCODONE AND ACETAMINOPHEN 2 TABLET: 7.5; 325 TABLET ORAL at 21:04

## 2021-03-09 RX ADMIN — OXYCODONE AND ACETAMINOPHEN 2 TABLET: 7.5; 325 TABLET ORAL at 14:17

## 2021-03-09 RX ADMIN — Medication 10 ML: at 11:52

## 2021-03-09 RX ADMIN — SODIUM CHLORIDE, POTASSIUM CHLORIDE, SODIUM LACTATE AND CALCIUM CHLORIDE 100 ML/HR: 600; 310; 30; 20 INJECTION, SOLUTION INTRAVENOUS at 02:59

## 2021-03-09 RX ADMIN — ONDANSETRON 4 MG: 2 INJECTION INTRAMUSCULAR; INTRAVENOUS at 06:56

## 2021-03-09 RX ADMIN — ZOLPIDEM TARTRATE 5 MG: 5 TABLET ORAL at 02:58

## 2021-03-09 RX ADMIN — OXYCODONE AND ACETAMINOPHEN 2 TABLET: 7.5; 325 TABLET ORAL at 06:56

## 2021-03-09 RX ADMIN — CEFEPIME HYDROCHLORIDE 2 G: 2 INJECTION, POWDER, FOR SOLUTION INTRAVENOUS at 01:15

## 2021-03-09 RX ADMIN — Medication 1 TABLET: at 09:26

## 2021-03-09 RX ADMIN — ENOXAPARIN SODIUM 40 MG: 40 INJECTION SUBCUTANEOUS at 20:19

## 2021-03-09 RX ADMIN — Medication 10 ML: at 21:00

## 2021-03-09 RX ADMIN — FAMOTIDINE 20 MG: 20 TABLET ORAL at 09:25

## 2021-03-09 RX ADMIN — FENTANYL CITRATE 100 MCG: 50 INJECTION, SOLUTION INTRAMUSCULAR; INTRAVENOUS at 11:52

## 2021-03-09 RX ADMIN — SODIUM CHLORIDE, POTASSIUM CHLORIDE, SODIUM LACTATE AND CALCIUM CHLORIDE 100 ML/HR: 600; 310; 30; 20 INJECTION, SOLUTION INTRAVENOUS at 17:23

## 2021-03-09 RX ADMIN — FENTANYL CITRATE 100 MCG: 50 INJECTION, SOLUTION INTRAMUSCULAR; INTRAVENOUS at 20:19

## 2021-03-09 RX ADMIN — CITALOPRAM HYDROBROMIDE 10 MG: 20 TABLET ORAL at 09:25

## 2021-03-09 NOTE — PROGRESS NOTES
Problem: Falls - Risk of  Goal: *Absence of Falls  Description: Document Lia Lim Fall Risk and appropriate interventions in the flowsheet.   Outcome: Progressing Towards Goal  Note: Fall Risk Interventions:  Mobility Interventions: Strengthening exercises (ROM-active/passive)    Mentation Interventions: Adequate sleep, hydration, pain control    Medication Interventions: Teach patient to arise slowly    Elimination Interventions: Call light in reach              Problem: Patient Education: Go to Patient Education Activity  Goal: Patient/Family Education  Outcome: Progressing Towards Goal

## 2021-03-09 NOTE — PROGRESS NOTES
Gynecology Progress Note    Antonina Laurent    Assesment and Plan:     Wound infection following  section, postpartum (3/6/2021)   -- POD # 11, s/p LTCS for FTP   --HD #4 for admission for wound infection and intra-abdominal abscess   -- Antibiotics changed on 3/8 after superficial wound culture noted E. Coli- now on Cefepime and Flagyl  -- Blood culture no growth, cultures from intraabdominal abscess pending    --IR placed intraabdominal drain on 3/8 - 295ml of purulent fluid ( 200ml at time of procedure and 95ml per nursing), Keep drain in place until minimal output   --Afebrile for 24 hrs currently, WBC decreased from yesterday now 19.2   --wound care BID per wound care team, examined with wound care nursing this am, Fentanyl push for dressing changes     Urine culture pending   DVT prophylaxis- Lovenox          Patient reports pain has improved slightly from yesterday. She reports she has not felt feverish since yesterday am. Denies nausea or vomiting.      Orders/Charges: High    Vitals:  Visit Vitals  /81 (BP 1 Location: Left upper arm, BP Patient Position: At rest)   Pulse 81   Temp 98.7 °F (37.1 °C)   Resp 18   Ht 5' 7\" (1.702 m)   Wt 104.3 kg (230 lb)   SpO2 98%   BMI 36.02 kg/m²     Temp (24hrs), Av.5 °F (36.9 °C), Min:98.1 °F (36.7 °C), Max:99.2 °F (37.3 °C)      Last 24hr Input/Output:    Intake/Output Summary (Last 24 hours) at 3/9/2021 0931  Last data filed at 3/9/2021 0600  Gross per 24 hour   Intake 4810 ml   Output 2935 ml   Net 1875 ml          Exam:  General: alert, cooperative, no distress, appears stated age     Lung: non labored respirations      Heart: normal peripheral perfusion      Abdomen: erythema extends beyond border that marked by initial provider                                   Midline of  section incision open approx  8cm, packing was saturated with purulent, malodorous drainage                                  Additional probing extended opening 2cm , Fascia probed intact                                   Tissue appeared healthy, no necrotic tissue visualized                                   Packing replaced by wound care team         Labs:   Lab Results   Component Value Date/Time    WBC 19.2 (H) 03/09/2021 05:13 AM    WBC 23.0 (H) 03/08/2021 04:38 AM    WBC 25.3 (H) 03/07/2021 08:10 PM    WBC 22.5 (H) 03/07/2021 02:13 AM    WBC 23.7 (H) 03/06/2021 07:50 AM    WBC 24.0 (H) 02/28/2021 04:16 AM    WBC 14.8 (H) 02/25/2021 05:08 PM    WBC 15.9 (H) 02/22/2021 12:41 PM    HGB 9.8 (L) 03/09/2021 05:13 AM    HGB 9.8 (L) 03/08/2021 04:38 AM    HGB 10.1 (L) 03/07/2021 02:13 AM    HGB 11.3 (L) 03/06/2021 07:50 AM    HGB 10.8 (L) 02/28/2021 04:16 AM    HGB 11.5 02/25/2021 05:08 PM    HGB 12.1 02/22/2021 12:41 PM    HCT 30.6 (L) 03/09/2021 05:13 AM    HCT 31.1 (L) 03/08/2021 04:38 AM    HCT 31.4 (L) 03/07/2021 02:13 AM    HCT 34.7 (L) 03/06/2021 07:50 AM    HCT 33.5 (L) 02/28/2021 04:16 AM    HCT 34.9 (L) 02/25/2021 05:08 PM    HCT 36.3 02/22/2021 12:41 PM    PLATELET 241 (H) 41/98/3391 05:13 AM    PLATELET 291 (H) 05/22/4889 04:38 AM    PLATELET 024 (H) 74/31/7372 02:13 AM    PLATELET 067 (H) 62/07/5782 07:50 AM    PLATELET 101 55/71/7774 04:16 AM    PLATELET 320 91/79/5389 05:08 PM    PLATELET 632 13/04/4744 12:41 PM    Hgb, External 14.2 08/13/2020    Hct, External 42.2 08/13/2020       Recent Results (from the past 24 hour(s))   CULTURE, BODY FLUID W GRAM STAIN    Collection Time: 03/08/21  1:32 PM    Specimen: Abscess   Result Value Ref Range    Special Requests: NO SPECIAL REQUESTS      GRAM STAIN 4+ WBCS SEEN      GRAM STAIN 4+ GRAM NEGATIVE RODS      Culture result: PENDING    URINALYSIS W/ RFLX MICROSCOPIC    Collection Time: 03/08/21  6:30 PM   Result Value Ref Range    Color YELLOW/STRAW      Appearance CLOUDY (A) CLEAR      Specific gravity 1.005 1.003 - 1.030      pH (UA) 6.5 5.0 - 8.0      Protein Negative NEG mg/dL    Glucose Negative NEG mg/dL    Ketone Negative NEG mg/dL    Bilirubin Negative NEG      Blood MODERATE (A) NEG      Urobilinogen 0.2 0.2 - 1.0 EU/dL    Nitrites Negative NEG      Leukocyte Esterase LARGE (A) NEG      WBC 10-20 0 - 4 /hpf    RBC 0-5 0 - 5 /hpf    Epithelial cells FEW FEW /lpf    Bacteria Negative NEG /hpf   CBC WITH AUTOMATED DIFF    Collection Time: 03/09/21  5:13 AM   Result Value Ref Range    WBC 19.2 (H) 3.6 - 11.0 K/uL    RBC 3.35 (L) 3.80 - 5.20 M/uL    HGB 9.8 (L) 11.5 - 16.0 g/dL    HCT 30.6 (L) 35.0 - 47.0 %    MCV 91.3 80.0 - 99.0 FL    MCH 29.3 26.0 - 34.0 PG    MCHC 32.0 30.0 - 36.5 g/dL    RDW 14.0 11.5 - 14.5 %    PLATELET 565 (H) 697 - 400 K/uL    MPV 8.8 (L) 8.9 - 12.9 FL    NRBC 0.0 0  WBC    ABSOLUTE NRBC 0.00 0.00 - 0.01 K/uL    NEUTROPHILS 70 32 - 75 %    BAND NEUTROPHILS 6 0 - 6 %    LYMPHOCYTES 14 12 - 49 %    MONOCYTES 7 5 - 13 %    EOSINOPHILS 1 0 - 7 %    BASOPHILS 0 0 - 1 %    MYELOCYTES 2 (H) 0 %    IMMATURE GRANULOCYTES 0 %    ABS. NEUTROPHILS 14.6 (H) 1.8 - 8.0 K/UL    ABS. LYMPHOCYTES 2.7 0.8 - 3.5 K/UL    ABS. MONOCYTES 1.3 (H) 0.0 - 1.0 K/UL    ABS. EOSINOPHILS 0.2 0.0 - 0.4 K/UL    ABS. BASOPHILS 0.0 0.0 - 0.1 K/UL    ABS. IMM. GRANS. 0.0 K/UL    DF MANUAL      RBC COMMENTS NORMOCYTIC, NORMOCHROMIC      WBC COMMENTS TOXIC GRANULATION     SAMPLES BEING HELD    Collection Time: 03/09/21  5:13 AM   Result Value Ref Range    SAMPLES BEING HELD 1PST     COMMENT        Add-on orders for these samples will be processed based on acceptable specimen integrity and analyte stability, which may vary by analyte.

## 2021-03-09 NOTE — PROGRESS NOTES
3/9/2021  10:55 AM    RUR:  9%  Risk Level: [x]Low []Moderate []High  Value-based purchasing: [] Yes [x] No  Bundle patient: [] Yes [x] No   Specify:     Transition of care plan:  1. Awaiting medical clearance and DC order. Pt now has an intraabdominal drain. 2. TBD - Home with HH vs none. 3. Outpatient follow-up. 4. Pt's family to transport.     VALE Gomez

## 2021-03-09 NOTE — PROGRESS NOTES
Problem: Falls - Risk of  Goal: *Absence of Falls  Description: Document Addi Lobe Fall Risk and appropriate interventions in the flowsheet.   Outcome: Progressing Towards Goal  Note: Fall Risk Interventions:  Mobility Interventions: Patient to call before getting OOB    Mentation Interventions: Adequate sleep, hydration, pain control    Medication Interventions: Evaluate medications/consider consulting pharmacy    Elimination Interventions: Call light in reach

## 2021-03-09 NOTE — WOUND CARE
Wound Consult:  New Patient Visit. Chart reviewed. Consulted for lower abdominal incisional wound. Spoke with patients nurse,  Keaton Valenzuela and she premedicated patient with IV pain med for wound care; in earlier in a.m with Dr. Nemo Sanchez and we assessed patient together; back in to pack wound with the ordered 1/4 S Dakins solution. Patient is resting on a Versacare bed with accumax mattress. She is alert and oriented x 4. She held up her abdomen to allow me to assess and pack; anxious prior to care but after reported good pain control. Assessment:  Lower abdominal incision - 6.4 x 2 x 3.6 cm, undermines towards 9 o'clock to 4 cm and towards 3 o'clock to 3.2 cm, moist red wound bed with some scattered tan/yellow slough; surrounding redness resolving and as well as induration; drainage on packing removed had tan purulent drainage (much like in accordion drain). Treatment:  Packed wound with 1/4 S Dakins solution in one continuous packing strip; folded 4 x 4 s, and tape to secure. Wound Recommendations:  q 12  Hour packing with 1/4 S Dakins moistened gauze packing. Continue to monitor nutrition, pain, and skin risk scale, and skin assessment. Pain with wound care likely to improve greatly as her inflammation is resolving. Plan: Will continue to follow.   Melida Marsh, [address], Wound / Ostomy Department  Wound Healing Office [phone]

## 2021-03-10 LAB
BASOPHILS # BLD: 0 K/UL (ref 0–0.1)
BASOPHILS NFR BLD: 0 % (ref 0–1)
COMMENT, HOLDF: NORMAL
DIFFERENTIAL METHOD BLD: ABNORMAL
EOSINOPHIL # BLD: 0.1 K/UL (ref 0–0.4)
EOSINOPHIL NFR BLD: 1 % (ref 0–7)
ERYTHROCYTE [DISTWIDTH] IN BLOOD BY AUTOMATED COUNT: 13.9 % (ref 11.5–14.5)
HCT VFR BLD AUTO: 30.9 % (ref 35–47)
HGB BLD-MCNC: 9.8 G/DL (ref 11.5–16)
IMM GRANULOCYTES # BLD AUTO: 0 K/UL
IMM GRANULOCYTES NFR BLD AUTO: 0 %
LYMPHOCYTES # BLD: 2 K/UL (ref 0.8–3.5)
LYMPHOCYTES NFR BLD: 14 % (ref 12–49)
MCH RBC QN AUTO: 28.6 PG (ref 26–34)
MCHC RBC AUTO-ENTMCNC: 31.7 G/DL (ref 30–36.5)
MCV RBC AUTO: 90.1 FL (ref 80–99)
MONOCYTES # BLD: 0.7 K/UL (ref 0–1)
MONOCYTES NFR BLD: 5 % (ref 5–13)
MYELOCYTES NFR BLD MANUAL: 1 %
NEUTS BAND NFR BLD MANUAL: 3 % (ref 0–6)
NEUTS SEG # BLD: 11.2 K/UL (ref 1.8–8)
NEUTS SEG NFR BLD: 76 % (ref 32–75)
NRBC # BLD: 0 K/UL (ref 0–0.01)
NRBC BLD-RTO: 0 PER 100 WBC
PLATELET # BLD AUTO: 639 K/UL (ref 150–400)
PMV BLD AUTO: 8.8 FL (ref 8.9–12.9)
RBC # BLD AUTO: 3.43 M/UL (ref 3.8–5.2)
RBC MORPH BLD: ABNORMAL
SAMPLES BEING HELD,HOLD: NORMAL
WBC # BLD AUTO: 14.2 K/UL (ref 3.6–11)
WBC MORPH BLD: ABNORMAL

## 2021-03-10 PROCEDURE — 85025 COMPLETE CBC W/AUTO DIFF WBC: CPT

## 2021-03-10 PROCEDURE — 36415 COLL VENOUS BLD VENIPUNCTURE: CPT

## 2021-03-10 PROCEDURE — 74011250636 HC RX REV CODE- 250/636: Performed by: OBSTETRICS & GYNECOLOGY

## 2021-03-10 PROCEDURE — 74011250637 HC RX REV CODE- 250/637: Performed by: OBSTETRICS & GYNECOLOGY

## 2021-03-10 PROCEDURE — 65270000029 HC RM PRIVATE

## 2021-03-10 PROCEDURE — 74011000250 HC RX REV CODE- 250: Performed by: OBSTETRICS & GYNECOLOGY

## 2021-03-10 RX ADMIN — METRONIDAZOLE 500 MG: 500 INJECTION, SOLUTION INTRAVENOUS at 09:52

## 2021-03-10 RX ADMIN — CEFEPIME HYDROCHLORIDE 2 G: 2 INJECTION, POWDER, FOR SOLUTION INTRAVENOUS at 09:46

## 2021-03-10 RX ADMIN — DAKIN'S SOLUTION 0.125% (QUARTER STRENGTH): 0.12 SOLUTION at 11:40

## 2021-03-10 RX ADMIN — ONDANSETRON 4 MG: 2 INJECTION INTRAMUSCULAR; INTRAVENOUS at 21:52

## 2021-03-10 RX ADMIN — Medication 10 ML: at 11:45

## 2021-03-10 RX ADMIN — CEFEPIME HYDROCHLORIDE 2 G: 2 INJECTION, POWDER, FOR SOLUTION INTRAVENOUS at 16:36

## 2021-03-10 RX ADMIN — CEFEPIME HYDROCHLORIDE 2 G: 2 INJECTION, POWDER, FOR SOLUTION INTRAVENOUS at 00:18

## 2021-03-10 RX ADMIN — ENOXAPARIN SODIUM 40 MG: 40 INJECTION SUBCUTANEOUS at 20:01

## 2021-03-10 RX ADMIN — Medication 10 ML: at 05:52

## 2021-03-10 RX ADMIN — OXYCODONE AND ACETAMINOPHEN 2 TABLET: 7.5; 325 TABLET ORAL at 16:36

## 2021-03-10 RX ADMIN — ONDANSETRON 4 MG: 2 INJECTION INTRAMUSCULAR; INTRAVENOUS at 06:10

## 2021-03-10 RX ADMIN — ZOLPIDEM TARTRATE 5 MG: 5 TABLET ORAL at 22:34

## 2021-03-10 RX ADMIN — Medication 10 ML: at 20:03

## 2021-03-10 RX ADMIN — METRONIDAZOLE 500 MG: 500 INJECTION, SOLUTION INTRAVENOUS at 20:01

## 2021-03-10 RX ADMIN — FAMOTIDINE 20 MG: 20 TABLET ORAL at 09:45

## 2021-03-10 RX ADMIN — DAKIN'S SOLUTION 0.125% (QUARTER STRENGTH): 0.12 SOLUTION at 20:03

## 2021-03-10 RX ADMIN — Medication 1 TABLET: at 09:44

## 2021-03-10 RX ADMIN — FENTANYL CITRATE 100 MCG: 50 INJECTION, SOLUTION INTRAMUSCULAR; INTRAVENOUS at 20:17

## 2021-03-10 RX ADMIN — Medication 10 ML: at 16:37

## 2021-03-10 RX ADMIN — FENTANYL CITRATE 100 MCG: 50 INJECTION, SOLUTION INTRAMUSCULAR; INTRAVENOUS at 11:19

## 2021-03-10 RX ADMIN — OXYCODONE AND ACETAMINOPHEN 2 TABLET: 7.5; 325 TABLET ORAL at 03:05

## 2021-03-10 RX ADMIN — OXYCODONE AND ACETAMINOPHEN 2 TABLET: 7.5; 325 TABLET ORAL at 22:34

## 2021-03-10 RX ADMIN — CITALOPRAM HYDROBROMIDE 10 MG: 20 TABLET ORAL at 09:45

## 2021-03-10 RX ADMIN — OXYCODONE AND ACETAMINOPHEN 2 TABLET: 7.5; 325 TABLET ORAL at 09:44

## 2021-03-10 NOTE — PROGRESS NOTES
Discussed pt's care with Dr. Bib Marrero (Infectious disease). He states that it is reasonable since patient is improving on cefepime and flagy to consider transitioning to PO cefdinir and flagyl tomorrow and to continue the course until 7 days s/p drain removal (while also following cultures closely to help guide treatment). Patient will need close outpatient follow up. Also spoke with wound care who do not recommend wound vac at this time. They will be able to assist with am dressing/packing change tomorrow. Case management to work on arranging home health.

## 2021-03-10 NOTE — PROGRESS NOTES
3/10/2021    5:09 PM  CM received message via [employer] from [address] denying referral as they do not accept Pt's insurance. CM discussed alternate options. Pt states that she is agreeable to using [address] [employer] or [employer]. CM sent referral to [address] [employer] via [employer]. Waiting on acceptance. CM advised Pt that someone would need to be taught wound care. Pt voiced understanding. 3:28 PM  CM received message from MD asking CM to arranged wound care. CM spoke with MD to clarify [facility] wound care orders. MD indicates likely dc late tomorrow and Pt will require [facility] on Friday (3/12). CM met with Pt to obtain [facility] agency choice. She would like to use [facility] as she is an employee. Pt signed Pt choice letter. CM sent referral via [employer] to [address] [place] [address]. CM sent message in [employer] to notify of possible dc tomorrow and [facility] for Friday. 9:27 AM    RUR:  9%  Risk Level: [x]Low []Moderate []High  Value-based purchasing: [] Yes [x] No  Bundle patient: [] Yes [x] No   Specify:     Transition of care plan:  1. Awaiting medical clearance and DC order. Pt now has an intraabdominal drain. Pt receiving wound care. 2. TBD - Home with HH vs none. 3. Outpatient follow-up. 4. Pt's family to transport.     SANTIAGO ManningW

## 2021-03-10 NOTE — PROGRESS NOTES
Gynecology Progress Note    Marina Dobbins    Assesment and Plan:      POD12 s/p PLTCS for FTP and HD5 for wound infection following  section, postpartum (3/6/2021) with intraabdominal abscess    -Antibiotics changed on 3/8 after superficial wound culture noted E. Coli- now on Cefepime and Flagyl  -Blood culture no growth, cultures from intraabdominal abscess pending    -IR placed intraabdominal drain on 3/8 - 85 ml output over past 24 hours, Keep drain in place until minimal output   -Afebrile for 48 hrs currently, WBC decreased from yesterday now 23.7>19.2> 14.2  --wound care BID per wound care team, Fentanyl push for dressing changes   -Plan to transition to PO antibiotics (will seek ID guidance on selecting appropriate regimen)  -Urine culture neg  DVT prophylaxis- Lovenox        -Dispo: Case management to help with arranging home health with possible d/c tomorrow     Patient reports pain has improved from yesterday. Denies nausea or vomiting. Able to void w/o issue. Denies fever, chills. Missing her baby but no signs of depression.     Orders/Charges: High    Vitals:  Visit Vitals  /83 (BP 1 Location: Left upper arm, BP Patient Position: At rest)   Pulse 75   Temp 98.2 °F (36.8 °C)   Resp 16   Ht 5' 7\" (1.702 m)   Wt 104.3 kg (230 lb)   SpO2 95%   BMI 36.02 kg/m²     Temp (24hrs), Av.4 °F (36.9 °C), Min:98.2 °F (36.8 °C), Max:98.8 °F (37.1 °C)      Last 24hr Input/Output:    Intake/Output Summary (Last 24 hours) at 3/10/2021 0820  Last data filed at 3/10/2021 0620  Gross per 24 hour   Intake 904 ml   Output 485 ml   Net 419 ml          Exam:  General: alert, cooperative, no distress, appears stated age     Lung: non labored respirations      Heart: normal peripheral perfusion      Abdomen: peritoneal drain site c/d/i, erythema improved and is well below border that marked by initial provider                                   Midline of  section incision open approx  8cm, packing  was removed                                  Tissue appeared healthy, no necrotic tissue visualized                                   Packing replaced         Labs:   Lab Results   Component Value Date/Time    WBC 14.2 (H) 03/10/2021 06:05 AM    WBC 19.2 (H) 03/09/2021 05:13 AM    WBC 23.0 (H) 03/08/2021 04:38 AM    WBC 25.3 (H) 03/07/2021 08:10 PM    WBC 22.5 (H) 03/07/2021 02:13 AM    WBC 23.7 (H) 03/06/2021 07:50 AM    WBC 24.0 (H) 02/28/2021 04:16 AM    WBC 14.8 (H) 02/25/2021 05:08 PM    WBC 15.9 (H) 02/22/2021 12:41 PM    HGB 9.8 (L) 03/10/2021 06:05 AM    HGB 9.8 (L) 03/09/2021 05:13 AM    HGB 9.8 (L) 03/08/2021 04:38 AM    HGB 10.1 (L) 03/07/2021 02:13 AM    HGB 11.3 (L) 03/06/2021 07:50 AM    HGB 10.8 (L) 02/28/2021 04:16 AM    HGB 11.5 02/25/2021 05:08 PM    HGB 12.1 02/22/2021 12:41 PM    HCT 30.9 (L) 03/10/2021 06:05 AM    HCT 30.6 (L) 03/09/2021 05:13 AM    HCT 31.1 (L) 03/08/2021 04:38 AM    HCT 31.4 (L) 03/07/2021 02:13 AM    HCT 34.7 (L) 03/06/2021 07:50 AM    HCT 33.5 (L) 02/28/2021 04:16 AM    HCT 34.9 (L) 02/25/2021 05:08 PM    HCT 36.3 02/22/2021 12:41 PM    PLATELET 239 (H) 66/24/1784 06:05 AM    PLATELET 687 (H) 51/46/6714 05:13 AM    PLATELET 719 (H) 76/74/3029 04:38 AM    PLATELET 818 (H) 67/29/1111 02:13 AM    PLATELET 923 (H) 36/89/7541 07:50 AM    PLATELET 733 13/26/1877 04:16 AM    PLATELET 761 99/89/2254 05:08 PM    PLATELET 363 61/66/1685 12:41 PM    Hgb, External 14.2 08/13/2020    Hct, External 42.2 08/13/2020       Recent Results (from the past 24 hour(s))   CBC WITH AUTOMATED DIFF    Collection Time: 03/10/21  6:05 AM   Result Value Ref Range    WBC 14.2 (H) 3.6 - 11.0 K/uL    RBC 3.43 (L) 3.80 - 5.20 M/uL    HGB 9.8 (L) 11.5 - 16.0 g/dL    HCT 30.9 (L) 35.0 - 47.0 %    MCV 90.1 80.0 - 99.0 FL    MCH 28.6 26.0 - 34.0 PG    MCHC 31.7 30.0 - 36.5 g/dL    RDW 13.9 11.5 - 14.5 %    PLATELET 036 (H) 791 - 400 K/uL    MPV 8.8 (L) 8.9 - 12.9 FL    NRBC 0.0 0  WBC    ABSOLUTE NRBC 0. 00 0.00 - 0.01 K/uL    NEUTROPHILS 76 (H) 32 - 75 %    BAND NEUTROPHILS 3 0 - 6 %    LYMPHOCYTES 14 12 - 49 %    MONOCYTES 5 5 - 13 %    EOSINOPHILS 1 0 - 7 %    BASOPHILS 0 0 - 1 %    MYELOCYTES 1 (H) 0 %    IMMATURE GRANULOCYTES 0 %    ABS. NEUTROPHILS 11.2 (H) 1.8 - 8.0 K/UL    ABS. LYMPHOCYTES 2.0 0.8 - 3.5 K/UL    ABS. MONOCYTES 0.7 0.0 - 1.0 K/UL    ABS. EOSINOPHILS 0.1 0.0 - 0.4 K/UL    ABS. BASOPHILS 0.0 0.0 - 0.1 K/UL    ABS. IMM. GRANS. 0.0 K/UL    DF MANUAL      RBC COMMENTS NORMOCYTIC, NORMOCHROMIC      WBC COMMENTS TOXIC GRANULATION     SAMPLES BEING HELD    Collection Time: 03/10/21  6:05 AM   Result Value Ref Range    SAMPLES BEING HELD 1PST     COMMENT        Add-on orders for these samples will be processed based on acceptable specimen integrity and analyte stability, which may vary by analyte.

## 2021-03-11 LAB
BACTERIA SPEC CULT: ABNORMAL
BACTERIA SPEC CULT: NORMAL
GRAM STN SPEC: NORMAL
GRAM STN SPEC: NORMAL
SERVICE CMNT-IMP: ABNORMAL
SERVICE CMNT-IMP: NORMAL
SERVICE CMNT-IMP: NORMAL

## 2021-03-11 PROCEDURE — 74011000250 HC RX REV CODE- 250: Performed by: OBSTETRICS & GYNECOLOGY

## 2021-03-11 PROCEDURE — 74011250637 HC RX REV CODE- 250/637: Performed by: OBSTETRICS & GYNECOLOGY

## 2021-03-11 PROCEDURE — 65270000029 HC RM PRIVATE

## 2021-03-11 PROCEDURE — 74011250636 HC RX REV CODE- 250/636: Performed by: OBSTETRICS & GYNECOLOGY

## 2021-03-11 RX ORDER — METRONIDAZOLE 250 MG/1
500 TABLET ORAL EVERY 12 HOURS
Status: DISCONTINUED | OUTPATIENT
Start: 2021-03-11 | End: 2021-03-13 | Stop reason: HOSPADM

## 2021-03-11 RX ORDER — CEFDINIR 300 MG/1
300 CAPSULE ORAL EVERY 12 HOURS
Status: DISCONTINUED | OUTPATIENT
Start: 2021-03-11 | End: 2021-03-11

## 2021-03-11 RX ORDER — CEFDINIR 300 MG/1
300 CAPSULE ORAL EVERY 12 HOURS
Status: DISCONTINUED | OUTPATIENT
Start: 2021-03-11 | End: 2021-03-13 | Stop reason: HOSPADM

## 2021-03-11 RX ORDER — METRONIDAZOLE 250 MG/1
500 TABLET ORAL EVERY 12 HOURS
Status: DISCONTINUED | OUTPATIENT
Start: 2021-03-11 | End: 2021-03-11

## 2021-03-11 RX ADMIN — ENOXAPARIN SODIUM 40 MG: 40 INJECTION SUBCUTANEOUS at 20:11

## 2021-03-11 RX ADMIN — Medication 10 ML: at 06:10

## 2021-03-11 RX ADMIN — METRONIDAZOLE 500 MG: 500 INJECTION, SOLUTION INTRAVENOUS at 09:30

## 2021-03-11 RX ADMIN — METRONIDAZOLE 500 MG: 250 TABLET ORAL at 20:10

## 2021-03-11 RX ADMIN — Medication 1 TABLET: at 09:30

## 2021-03-11 RX ADMIN — OXYCODONE HYDROCHLORIDE AND ACETAMINOPHEN 2 TABLET: 5; 325 TABLET ORAL at 19:35

## 2021-03-11 RX ADMIN — ONDANSETRON 4 MG: 2 INJECTION INTRAMUSCULAR; INTRAVENOUS at 21:59

## 2021-03-11 RX ADMIN — CEFEPIME HYDROCHLORIDE 2 G: 2 INJECTION, POWDER, FOR SOLUTION INTRAVENOUS at 09:30

## 2021-03-11 RX ADMIN — OXYCODONE AND ACETAMINOPHEN 2 TABLET: 7.5; 325 TABLET ORAL at 04:20

## 2021-03-11 RX ADMIN — OXYCODONE HYDROCHLORIDE AND ACETAMINOPHEN 2 TABLET: 5; 325 TABLET ORAL at 09:31

## 2021-03-11 RX ADMIN — OXYCODONE AND ACETAMINOPHEN 2 TABLET: 7.5; 325 TABLET ORAL at 12:55

## 2021-03-11 RX ADMIN — Medication 10 ML: at 20:12

## 2021-03-11 RX ADMIN — DAKIN'S SOLUTION 0.125% (QUARTER STRENGTH): 0.12 SOLUTION at 20:11

## 2021-03-11 RX ADMIN — CEFDINIR 300 MG: 300 CAPSULE ORAL at 20:10

## 2021-03-11 RX ADMIN — FAMOTIDINE 20 MG: 20 TABLET ORAL at 09:30

## 2021-03-11 RX ADMIN — FENTANYL CITRATE 100 MCG: 50 INJECTION, SOLUTION INTRAMUSCULAR; INTRAVENOUS at 20:10

## 2021-03-11 RX ADMIN — Medication 10 ML: at 12:55

## 2021-03-11 RX ADMIN — CITALOPRAM HYDROBROMIDE 10 MG: 20 TABLET ORAL at 09:30

## 2021-03-11 RX ADMIN — DAKIN'S SOLUTION 0.125% (QUARTER STRENGTH): 0.12 SOLUTION at 09:39

## 2021-03-11 RX ADMIN — CEFEPIME HYDROCHLORIDE 2 G: 2 INJECTION, POWDER, FOR SOLUTION INTRAVENOUS at 00:08

## 2021-03-11 RX ADMIN — ZOLPIDEM TARTRATE 5 MG: 5 TABLET ORAL at 22:39

## 2021-03-11 NOTE — PROGRESS NOTES
Gynecology Progress Note    Antonina Laurent    Assesment and Plan:      POD13 s/p PLTCS for FTP and HD6 for wound infection following  section, postpartum (3/6/2021) with intraabdominal abscess    -Remains afebrile. Abx changed to PO cefdinir and metronidazole today  -Blood culture no growth, cultures from intraabdominal abscess gram negative anearobes, beta lactamase negative  -IR placed intraabdominal drain on 3/8. 60mL out overnight, additional 40mL as of midday. Continue drain in place until minimal output (no more than 5-10cc/shift  -Afebrile for 48 hrs currently, WBC decreased from  23.7>19.2> 14.2, recheck in AM  --wound care BID per wound care team, can go to once daily after discharge Fentanyl push for dressing changes   -Urine culture neg  DVT prophylaxis- Lovenox        -Disposition: Case management unable to get a home health agency in patient's area, but her local family practice can do wound care once daily. Plan for discharge once drain can be removed, assuming patient tolerates po abx without any signs of worsening infection. Patient reports pain has improved from yesterday. Denies nausea or vomiting. Able to void w/o issue. Denies fever, chills. Missing her baby but no signs of depression.      Orders/Charges: High    Vitals:  Visit Vitals  /68   Pulse 69   Temp 98.3 °F (36.8 °C)   Resp 16   Ht 5' 7.01\" (1.702 m)   Wt 104.3 kg (230 lb)   SpO2 100%   BMI 36.01 kg/m²     Temp (24hrs), Av.4 °F (36.9 °C), Min:98.1 °F (36.7 °C), Max:98.8 °F (37.1 °C)      Last 24hr Input/Output:    Intake/Output Summary (Last 24 hours) at 3/11/2021 1730  Last data filed at 3/11/2021 1337  Gross per 24 hour   Intake 1170 ml   Output 30 ml   Net 1140 ml          Exam:  General: alert, cooperative, no distress, appears stated age     Lung: non labored respirations      Heart: normal peripheral perfusion      Abdomen: peritoneal drain site c/d/i, erythema improved and is well below border that marked by initial provider                  Midline of  section incision open approx 6cm at today's dressing change per wound care nurse, appears to be healing well      Labs:   Lab Results   Component Value Date/Time    WBC 14.2 (H) 03/10/2021 06:05 AM    WBC 19.2 (H) 2021 05:13 AM    WBC 23.0 (H) 2021 04:38 AM    WBC 25.3 (H) 2021 08:10 PM    WBC 22.5 (H) 2021 02:13 AM    WBC 23.7 (H) 2021 07:50 AM    WBC 24.0 (H) 2021 04:16 AM    WBC 14.8 (H) 2021 05:08 PM    WBC 15.9 (H) 2021 12:41 PM    HGB 9.8 (L) 03/10/2021 06:05 AM    HGB 9.8 (L) 2021 05:13 AM    HGB 9.8 (L) 2021 04:38 AM    HGB 10.1 (L) 2021 02:13 AM    HGB 11.3 (L) 2021 07:50 AM    HGB 10.8 (L) 2021 04:16 AM    HGB 11.5 2021 05:08 PM    HGB 12.1 2021 12:41 PM    HCT 30.9 (L) 03/10/2021 06:05 AM    HCT 30.6 (L) 2021 05:13 AM    HCT 31.1 (L) 2021 04:38 AM    HCT 31.4 (L) 2021 02:13 AM    HCT 34.7 (L) 2021 07:50 AM    HCT 33.5 (L) 2021 04:16 AM    HCT 34.9 (L) 2021 05:08 PM    HCT 36.3 2021 12:41 PM    PLATELET 193 (H)  06:05 AM    PLATELET 126 (H)  05:13 AM    PLATELET 528 (H)  04:38 AM    PLATELET 110 (H)  02:13 AM    PLATELET 364 (H) 6044 07:50 AM    PLATELET 008  04:16 AM    PLATELET 891  05:08 PM    PLATELET 184  12:41 PM    Hgb, External 14.2 2020    Hct, External 42.2 2020       No results found for this or any previous visit (from the past 24 hour(s)).

## 2021-03-11 NOTE — PROGRESS NOTES
Problem: Falls - Risk of  Goal: *Absence of Falls  Description: Document Berhane Mandujano Fall Risk and appropriate interventions in the flowsheet.   Outcome: Progressing Towards Goal  Note: Fall Risk Interventions:  Mobility Interventions: Patient to call before getting OOB    Mentation Interventions: Adequate sleep, hydration, pain control    Medication Interventions: Patient to call before getting OOB    Elimination Interventions: Call light in reach              Problem: Patient Education: Go to Patient Education Activity  Goal: Patient/Family Education  Outcome: Progressing Towards Goal     Problem: Pain  Goal: *Control of Pain  Outcome: Progressing Towards Goal  Goal: *PALLIATIVE CARE:  Alleviation of Pain  Outcome: Progressing Towards Goal     Problem: Patient Education: Go to Patient Education Activity  Goal: Patient/Family Education  Outcome: Progressing Towards Goal

## 2021-03-11 NOTE — PROGRESS NOTES
3/11/2021    4:57 PM  CM attempting to schedule an appt for f/u wound care with PCP on Monday. No answer. CM left a message. 12:35 PM    RUR:  9%  Risk Level: [x]Low []Moderate []High  Value-based purchasing: [] Yes [x] No  Bundle patient: [] Yes [x] No   Specify:     Transition of care plan:  1. Awaiting medical clearance and dc orders. CM was advised of possible dc today, however, MD indicates that Pt's drain is having too much output at this time for dc.  2. CM received orders to arranged [facility] services. CM received notification that [facility] is able to accept due to insurance. CM met with Pt. Pt is willing to use any [facility] agency that accepts insurance. CM contacted Oli Grimm at [address] at [facility], and [facility] none are able to accept Pt's insurance. CM spoke with Luisa at [facility]. Luisa states they are not able to accept due to staffing. CM reviewed [facility] options on insurance website. No new options identified. CM met with Pt re: possible need for outpatient wound care as HH isn't an option. Pt states that she is unable to come to [facility] for wound care as she doesn't have transportation. CM advised Pt that she had a transportation option through her insurance. Pt does not seem interested in that option. CM asked if Pt would be able to go to PCP office for wound care if that was an option. Pt states that she is a Pt at [address] [place] [address]. CM spoke with John Kirkland at [address] [place] [address] to discuss possible need for wound care upon dc. Sandy coordinated with Merly Arana (nurse) in office. John Sera states they would be able to assist with wound care, however, Pt would be charged as an office visit per visit. CM met with Pt and advised. Pt is agreeable to that option. Pt states that she has 2 nurses in community willing to assist with wound care ([facility] can help in evening and her friend is a nurse at [employer] who is willing to help).            YEIMY coordinated with MD re: above info. MD indicates he is            agreeable to Pt returning home with family/friend assistance           and follow up with PCP. MD indicates possible dc over weekend. CM also coordinated with wound care nurse who recommends         that wound care change to daily upon dc.      3. Outpatient follow-up. 4. Family to transport upon dc.     SANTIAGO DianaW

## 2021-03-11 NOTE — PROGRESS NOTES
Comprehensive Nutrition Assessment      Type and Reason for Visit: Initial, RD nutrition re-screen/LOS    Nutrition Recommendations/Plan:   1. Continue regular diet- wound recommend No concentrated sweets. 2. Added Ensure HP chocolate BID for wound healing. Pt didn't like [place] Session. 3. Monitor BG, weight. Nutrition Assessment:       Pt admitted for Wound infection following  section, postpartum [O86.00]. Pt  has a past medical history of Anemia, Asthma, Liver disease, PCOS and Psychiatric problem. Pt screened for LOS. Pt with abdominal wound, wound care following. Pt is eating fair/well. Pt doesn't really eat breakfast normally and pt is a very picky eater. Prefers hamburger/frech fries to standard meal. Encouraged lean protein intake for wound healing. Recommended Jayesh but pt tried and didn't like it. Pt unsure if she has had any weight changes other than recent pregnancy- pt states she didn't gain any weight during pregnancy. No wt hx in chart. Added Ensure HP for lower total kcal and sugar intake. Monitor weight and PO intake. Wt Readings from Last 10 Encounters:   21 104.3 kg (230 lb)   21 105.2 kg (232 lb)   21 105.2 kg (232 lb)       Malnutrition Assessment:  Malnutrition Status:  No malnutrition          Estimated Daily Nutrient Needs:  Energy (kcal):   (BMR x1.3)-250  Protein (g):  104       Fluid (ml/day):      Meal intake:   Patient Vitals for the past 168 hrs:   % Diet Eaten   21 0958 5 %   03/10/21 1934 75 %   03/10/21 1631 50 %   03/10/21 0956 50 %   21 1830 25 %   21 1014 5 %   21 2000 100 %   21 1900 25 %   21 1423 0 %         Nutrition Related Findings:     Last BM 3/7, no edema.       Nutritionally Significant Medications:   Celexa, Pepcid, Flagyl,       Wounds:    Surgical incision       Current Nutrition Therapies:  DIET REGULAR  DIET ONE TIME MESSAGE  DIET ONE TIME MESSAGE  DIET NUTRITIONAL SUPPLEMENTS Dinner, Breakfast; Ensure High Protein    Anthropometric Measures:  · Height:  5' 7.01\" (170.2 cm)  · Current Body Wt:  104.3 kg (229 lb 15 oz)   · Admission Body Wt:    229 lbs   · Usual Body Wt:    230 lbs    · Ideal Body Wt:  135 lbs:  170.3 %   · BMI Category:  Obese class 2 (BMI 35.0-39. 9)       Nutrition Diagnosis:   · Inadequate protein intake related to increased demand for energy/nutrients as evidenced by wounds      Nutrition Interventions:   Food and/or Nutrient Delivery: Continue current diet, Start oral nutrition supplement  Nutrition Education and Counseling: No recommendations at this time  Coordination of Nutrition Care: Continue to monitor while inpatient, Interdisciplinary rounds    Goals:  Pt will consume >50% of meals and 1-2 ONS per day within 3-5 days       Nutrition Monitoring and Evaluation:   Behavioral-Environmental Outcomes: Readiness for change  Food/Nutrient Intake Outcomes: Food and nutrient intake, IVF intake, Supplement intake  Physical Signs/Symptoms Outcomes: Biochemical data, Weight, Skin    Discharge Planning:    Continue oral nutrition supplement, Continue current diet     Electronically signed by Shawna Fiore [address]     Contact: [phone]

## 2021-03-11 NOTE — WOUND CARE
Wound follow up:  Patient laying in bed, awake alert, moves freely in bed. Intra abdominal drain intact and taped in place. Lower abdominal incision - 6.4 x 2 x 3.6 cm, undermines towards 9 o'clock to 4 cm and towards 3 o'clock to 3.2 cm, moist red wound bed with slight scattered tan/yellow slough; surrounding redness resolving and as well as induration; drainage on packing removed had serous sanguinous drainage. No odor. Treatment:   Packed wound with 1/4S Dakins solution in one continuous packing strip; folded 4x4's and ABD taped in place. Patient education given on dressing care at home, patient to only use gauze roll of 1 piece. Patient stated she has someone at home that can help her with dressing. Suggested these persons be educated on wound dressing here at hospital before discharge. Wound Recommendations:   Packing with 1/4 S Dakins moistened gauze packing decrease to daily for discahrge.   Continue to monitor nutrition, pain, and skin risk scale, and skin assessment

## 2021-03-12 VITALS
HEART RATE: 79 BPM | RESPIRATION RATE: 16 BRPM | SYSTOLIC BLOOD PRESSURE: 117 MMHG | OXYGEN SATURATION: 98 % | DIASTOLIC BLOOD PRESSURE: 79 MMHG | WEIGHT: 230 LBS | BODY MASS INDEX: 36.1 KG/M2 | HEIGHT: 67 IN | TEMPERATURE: 97.9 F

## 2021-03-12 LAB
BACTERIA SPEC CULT: NORMAL
BASOPHILS # BLD: 0 K/UL (ref 0–0.1)
BASOPHILS NFR BLD: 0 % (ref 0–1)
DIFFERENTIAL METHOD BLD: ABNORMAL
EOSINOPHIL # BLD: 0.3 K/UL (ref 0–0.4)
EOSINOPHIL NFR BLD: 2 % (ref 0–7)
ERYTHROCYTE [DISTWIDTH] IN BLOOD BY AUTOMATED COUNT: 13.5 % (ref 11.5–14.5)
HCT VFR BLD AUTO: 34.4 % (ref 35–47)
HGB BLD-MCNC: 11.1 G/DL (ref 11.5–16)
IMM GRANULOCYTES # BLD AUTO: 0 K/UL
IMM GRANULOCYTES NFR BLD AUTO: 0 %
LYMPHOCYTES # BLD: 1.7 K/UL (ref 0.8–3.5)
LYMPHOCYTES NFR BLD: 13 % (ref 12–49)
MCH RBC QN AUTO: 29.2 PG (ref 26–34)
MCHC RBC AUTO-ENTMCNC: 32.3 G/DL (ref 30–36.5)
MCV RBC AUTO: 90.5 FL (ref 80–99)
METAMYELOCYTES NFR BLD MANUAL: 1 %
MONOCYTES # BLD: 1 K/UL (ref 0–1)
MONOCYTES NFR BLD: 8 % (ref 5–13)
NEUTS BAND NFR BLD MANUAL: 11 % (ref 0–6)
NEUTS SEG # BLD: 9.8 K/UL (ref 1.8–8)
NEUTS SEG NFR BLD: 65 % (ref 32–75)
NRBC # BLD: 0 K/UL (ref 0–0.01)
NRBC BLD-RTO: 0 PER 100 WBC
PLATELET # BLD AUTO: 728 K/UL (ref 150–400)
PMV BLD AUTO: 8.6 FL (ref 8.9–12.9)
RBC # BLD AUTO: 3.8 M/UL (ref 3.8–5.2)
RBC MORPH BLD: ABNORMAL
RBC MORPH BLD: ABNORMAL
SERVICE CMNT-IMP: NORMAL
WBC # BLD AUTO: 12.9 K/UL (ref 3.6–11)

## 2021-03-12 PROCEDURE — 74011250636 HC RX REV CODE- 250/636: Performed by: OBSTETRICS & GYNECOLOGY

## 2021-03-12 PROCEDURE — 74011250637 HC RX REV CODE- 250/637: Performed by: OBSTETRICS & GYNECOLOGY

## 2021-03-12 PROCEDURE — 85025 COMPLETE CBC W/AUTO DIFF WBC: CPT

## 2021-03-12 PROCEDURE — 74011000250 HC RX REV CODE- 250: Performed by: OBSTETRICS & GYNECOLOGY

## 2021-03-12 PROCEDURE — 36415 COLL VENOUS BLD VENIPUNCTURE: CPT

## 2021-03-12 PROCEDURE — 0WPJ30Z REMOVAL OF DRAINAGE DEVICE FROM PELVIC CAVITY, PERCUTANEOUS APPROACH: ICD-10-PCS | Performed by: OBSTETRICS & GYNECOLOGY

## 2021-03-12 RX ORDER — CEFDINIR 300 MG/1
300 CAPSULE ORAL EVERY 12 HOURS
Qty: 14 CAP | Refills: 0 | Status: SHIPPED | OUTPATIENT
Start: 2021-03-12 | End: 2021-03-19

## 2021-03-12 RX ORDER — CITALOPRAM 20 MG/1
20 TABLET, FILM COATED ORAL DAILY
Status: DISCONTINUED | OUTPATIENT
Start: 2021-03-12 | End: 2021-03-13 | Stop reason: HOSPADM

## 2021-03-12 RX ORDER — DOCUSATE SODIUM 100 MG/1
100 CAPSULE, LIQUID FILLED ORAL DAILY
Status: DISCONTINUED | OUTPATIENT
Start: 2021-03-12 | End: 2021-03-13 | Stop reason: HOSPADM

## 2021-03-12 RX ORDER — CITALOPRAM 20 MG/1
20 TABLET, FILM COATED ORAL DAILY
Qty: 30 TAB | Refills: 0 | Status: SHIPPED | OUTPATIENT
Start: 2021-03-13 | End: 2022-08-08 | Stop reason: ALTCHOICE

## 2021-03-12 RX ORDER — POLYETHYLENE GLYCOL 3350 17 G/17G
17 POWDER, FOR SOLUTION ORAL DAILY
Status: DISCONTINUED | OUTPATIENT
Start: 2021-03-12 | End: 2021-03-13 | Stop reason: HOSPADM

## 2021-03-12 RX ORDER — OXYCODONE AND ACETAMINOPHEN 5; 325 MG/1; MG/1
2 TABLET ORAL
Qty: 21 TAB | Refills: 0 | Status: SHIPPED | OUTPATIENT
Start: 2021-03-12 | End: 2021-03-19

## 2021-03-12 RX ORDER — METRONIDAZOLE 500 MG/1
500 TABLET ORAL EVERY 12 HOURS
Qty: 14 TAB | Refills: 0 | Status: SHIPPED | OUTPATIENT
Start: 2021-03-12 | End: 2021-03-19

## 2021-03-12 RX ADMIN — OXYCODONE AND ACETAMINOPHEN 2 TABLET: 7.5; 325 TABLET ORAL at 03:18

## 2021-03-12 RX ADMIN — OXYCODONE HYDROCHLORIDE AND ACETAMINOPHEN 2 TABLET: 5; 325 TABLET ORAL at 10:00

## 2021-03-12 RX ADMIN — POLYETHYLENE GLYCOL 3350 17 G: 17 POWDER, FOR SOLUTION ORAL at 12:45

## 2021-03-12 RX ADMIN — ENOXAPARIN SODIUM 40 MG: 40 INJECTION SUBCUTANEOUS at 19:43

## 2021-03-12 RX ADMIN — OXYCODONE AND ACETAMINOPHEN 2 TABLET: 7.5; 325 TABLET ORAL at 19:44

## 2021-03-12 RX ADMIN — ONDANSETRON 4 MG: 2 INJECTION INTRAMUSCULAR; INTRAVENOUS at 10:05

## 2021-03-12 RX ADMIN — CITALOPRAM HYDROBROMIDE 20 MG: 20 TABLET ORAL at 10:00

## 2021-03-12 RX ADMIN — METRONIDAZOLE 500 MG: 250 TABLET ORAL at 10:00

## 2021-03-12 RX ADMIN — METRONIDAZOLE 500 MG: 250 TABLET ORAL at 19:44

## 2021-03-12 RX ADMIN — DOCUSATE SODIUM 100 MG: 100 CAPSULE ORAL at 12:45

## 2021-03-12 RX ADMIN — DAKIN'S SOLUTION 0.125% (QUARTER STRENGTH): 0.12 SOLUTION at 10:11

## 2021-03-12 RX ADMIN — FAMOTIDINE 20 MG: 20 TABLET ORAL at 10:00

## 2021-03-12 RX ADMIN — ONDANSETRON 4 MG: 2 INJECTION INTRAMUSCULAR; INTRAVENOUS at 19:44

## 2021-03-12 RX ADMIN — Medication 1 TABLET: at 10:00

## 2021-03-12 RX ADMIN — CEFDINIR 300 MG: 300 CAPSULE ORAL at 19:44

## 2021-03-12 RX ADMIN — Medication 10 ML: at 12:45

## 2021-03-12 RX ADMIN — CEFDINIR 300 MG: 300 CAPSULE ORAL at 10:01

## 2021-03-12 NOTE — PROGRESS NOTES
3/12/2021  2:11 PM    RUR:  9%  Risk Level:  Low   Value-based purchasing:  No   Bundle patient:  No     Transition of care plan:  1. Awaiting medical clearance and dc orders. Pt continues to have output from her intraabdominal drain. MD indicates possible dc on 3/13. 2. Pt to return home with family. CM unable to arrange [facility] (secondary to location and insurance). MD aware. Plan is for family/friends to assist with wound care. Pt also willing to follow up outpatient with her PCP for wound care. CM made arrangements for PCP follow up on Monday, 3/15 @ 11am. CM spoke with Jessica Singh at [address] CM faxed necessary medicals to PCP's office at [phone]. CM updated Pt.  3. Outpatient follow-up.   4. Pt's family to transport    Ravi Vasquez

## 2021-03-12 NOTE — PROGRESS NOTES
Dr. Bob Rios made aware of Drain output, 20ml. No new orders, will stop by around 1730 to round on patient.

## 2021-03-12 NOTE — PROGRESS NOTES
Last BM on 3/5/2021. Ms. Beasley Locks on narcs for pain. Dr. Letty Galo updated. Ne orders received for bowel regimen. Pt updated.

## 2021-03-12 NOTE — PROGRESS NOTES
Gynecology Progress Note    Ron Scales    POD14 s/p PLTCS for FTP and HD#7 for wound infection following  section, postpartum (3/6/2021) with intraabdominal abscess. Patient reports pain improves daily. Denies nausea or vomiting and tolerates Regualr diet. Able to void w/o issue. Denies fever, chills. Emotional today with missing her baby and she is concerned about her PTSD history. She desires to increase her Celexa to 20mg which she says she was on at home prior to admission. Vitals:    Patient Vitals for the past 24 hrs:   BP Temp Pulse Resp SpO2 Height   21 0311 119/85 98.4 °F (36.9 °C) 83 16 96 % --   21 2242 130/88 98.8 °F (37.1 °C) 67 16 99 % --   21 1945 106/73 98.2 °F (36.8 °C) 76 16 97 % --   21 1624 116/68 98.3 °F (36.8 °C) 69 16 100 % --   21 1337 -- -- -- -- -- 5' 7.01\" (1.702 m)   21 1300 114/71 98.3 °F (36.8 °C) 68 17 98 % --   21 0912 114/66 98.1 °F (36.7 °C) 87 16 95 % --     Temp (24hrs), Av.4 °F (36.9 °C), Min:98.1 °F (36.7 °C), Max:98.8 °F (37.1 °C)    I&O:   No intake/output data recorded. 03/10 1901 -  0700  In: 1410 [P.O.:600; I.V.:810]  Out: 60ml [Drains:60ml] Only 20ml     Exam:  Patient without distress. Lower extremities edema trace              Abdomen: peritoneal drain site c/d/i, erythema improved and is well below border that marked by initial provider                Midline of  section incision open approx 6cm at yesterday's dressing change per wound care nurse, appears to be healing well.       Labs:   Recent Results (from the past 24 hour(s))   CBC WITH AUTOMATED DIFF    Collection Time: 21  5:14 AM   Result Value Ref Range    WBC 12.9 (H) 3.6 - 11.0 K/uL    RBC 3.80 3.80 - 5.20 M/uL    HGB 11.1 (L) 11.5 - 16.0 g/dL    HCT 34.4 (L) 35.0 - 47.0 %    MCV 90.5 80.0 - 99.0 FL    MCH 29.2 26.0 - 34.0 PG    MCHC 32.3 30.0 - 36.5 g/dL    RDW 13.5 11.5 - 14.5 %    PLATELET 995 (H) 150 - 400 K/uL    MPV 8.6 (L) 8.9 - 12.9 FL    NRBC 0.0 0  WBC    ABSOLUTE NRBC 0.00 0.00 - 0.01 K/uL    NEUTROPHILS 65 32 - 75 %    BAND NEUTROPHILS 11 (H) 0 - 6 %    LYMPHOCYTES 13 12 - 49 %    MONOCYTES 8 5 - 13 %    EOSINOPHILS 2 0 - 7 %    BASOPHILS 0 0 - 1 %    METAMYELOCYTES 1 (H) 0 %    IMMATURE GRANULOCYTES 0 %    ABS. NEUTROPHILS 9.8 (H) 1.8 - 8.0 K/UL    ABS. LYMPHOCYTES 1.7 0.8 - 3.5 K/UL    ABS. MONOCYTES 1.0 0.0 - 1.0 K/UL    ABS. EOSINOPHILS 0.3 0.0 - 0.4 K/UL    ABS. BASOPHILS 0.0 0.0 - 0.1 K/UL    ABS. IMM. GRANS. 0.0 K/UL    DF MANUAL      RBC COMMENTS MACROCYTOSIS  PRESENT        RBC COMMENTS MICROCYTOSIS  PRESENT         Assessment and Plan:   POD14 s/p PLTCS for FTP and HD#7 for wound infection following  section, postpartum (3/6/2021) with intraabdominal abscess    Patient Active Problem List    Diagnosis Date Noted    Wound infection following  section, postpartum 2021     POD14 s/p PLTCS for FTP and HD#7 for wound infection following  section, postpartum (3/6/2021) with intraabdominal abscess    -Remains afebrile. Continue PO cefdinir and metronidazole today which patient tolerated yesterday.   -Blood culture no growth, cultures from intraabdominal abscess gram negative anearobes, beta lactamase negative  -IR placed intraabdominal drain on 3/8. 60mL for 24hours and only 20mL out overnight. Continue drain in place until minimal output (no more than 5-10cc/shift or 30ml day) Will monitor day output today. -Afebrile for 72 hrs currently, WBC decreased from  23.7>19.2> 14.2>12.9, No plans to recheck at this time. --wound care BID per wound care team, can go to once daily after discharge. Fentanyl push for dressing changes   -Urine culture neg  DVT prophylaxis- Lovenox       -Disposition: Case management unable to get a home health agency in patient's area, but her local family practice can do wound care once daily.  Today they are arranging for wound care on Monday at her [facility] office. Plan for discharge once drain can be removed, assuming patient tolerates po abx without any signs of worsening infection.  Likely discharge in 3/13 AM.

## 2021-03-12 NOTE — PROGRESS NOTES
Per Dr. Nancy Garcia, Ms. Dobbins to receive PO Cefdinir 8pm dose prior to DC home tonight. RN assigned updated.

## 2021-03-12 NOTE — PROGRESS NOTES
Gynecology Progress Note    Veronica Dobbins    POD14 s/p PLTCS for FTP and HD#7 for wound infection following  section, postpartum (3/6/2021) with intraabdominal abscess.     Patient reports a good day today. Drain output ~15ml from 7AM to 6PM. Denies nausea or vomiting and tolerates Regualr diet. Able to void w/o issue. Denies fever, chills. She feels strongly that she is ready for discharge this evening and will comply with her ABx and f/u appts. Vitals:    Patient Vitals for the past 24 hrs:   BP Temp Pulse Resp SpO2   21 1400 117/79 97.9 °F (36.6 °C) 79 16 98 %   21 1000 121/76 98.7 °F (37.1 °C) 85 17 99 %   21 0311 119/85 98.4 °F (36.9 °C) 83 16 96 %   21 2242 130/88 98.8 °F (37.1 °C) 67 16 99 %   21 1945 106/73 98.2 °F (36.8 °C) 76 16 97 %     Temp (24hrs), Av.4 °F (36.9 °C), Min:97.9 °F (36.6 °C), Max:98.8 °F (37.1 °C)    I&O:    0701 -  1900  In: 800 [P.O.:800]  Out: 9457 [Urine:1400; Drains:20]               Exam:  Patient without distress. Abdomen soft, non-tender               Removed pigtail catheter at the bedside without difficulty and covered it with 4x4 and tegaderm. No additional drainage was  noted after removal and the incision was expressed.    Wound dressing change took place today at 1700                  Labs:   Recent Results (from the past 24 hour(s))   CBC WITH AUTOMATED DIFF    Collection Time: 21  5:14 AM   Result Value Ref Range    WBC 12.9 (H) 3.6 - 11.0 K/uL    RBC 3.80 3.80 - 5.20 M/uL    HGB 11.1 (L) 11.5 - 16.0 g/dL    HCT 34.4 (L) 35.0 - 47.0 %    MCV 90.5 80.0 - 99.0 FL    MCH 29.2 26.0 - 34.0 PG    MCHC 32.3 30.0 - 36.5 g/dL    RDW 13.5 11.5 - 14.5 %    PLATELET 053 (H) 291 - 400 K/uL    MPV 8.6 (L) 8.9 - 12.9 FL    NRBC 0.0 0  WBC    ABSOLUTE NRBC 0.00 0.00 - 0.01 K/uL    NEUTROPHILS 65 32 - 75 %    BAND NEUTROPHILS 11 (H) 0 - 6 %    LYMPHOCYTES 13 12 - 49 %    MONOCYTES 8 5 - 13 % EOSINOPHILS 2 0 - 7 %    BASOPHILS 0 0 - 1 %    METAMYELOCYTES 1 (H) 0 %    IMMATURE GRANULOCYTES 0 %    ABS. NEUTROPHILS 9.8 (H) 1.8 - 8.0 K/UL    ABS. LYMPHOCYTES 1.7 0.8 - 3.5 K/UL    ABS. MONOCYTES 1.0 0.0 - 1.0 K/UL    ABS. EOSINOPHILS 0.3 0.0 - 0.4 K/UL    ABS. BASOPHILS 0.0 0.0 - 0.1 K/UL    ABS. IMM. GRANS. 0.0 K/UL    DF MANUAL      RBC COMMENTS MACROCYTOSIS  PRESENT        RBC COMMENTS MICROCYTOSIS  PRESENT           Assessment and Plan:     Patient Active Problem List    Diagnosis Date Noted    Wound infection following  section, postpartum 2021     POD14 s/p PLTCS for FTP and HD#7 for wound infection following  section, postpartum (3/6/2021) with intraabdominal abscess     -Remains afebrile. Continue PO cefdinir and metronidazole today which patient tolerated yesterday.   -Blood culture no growth, cultures from intraabdominal abscess gram negative anearobes, beta lactamase negative  -IR placed intraabdominal drain on 3/8. 60mL for 24hours and only 20mL out overnight. Removed drain. -Afebrile for >84 hrs currently, WBC decreased from  23.7>19.2> 14.2>12.9, No plans to recheck at this time. --wound care BID per wound care team, can go to once daily after discharge. Fentanyl push for dressing changes   -Urine culture neg  DVT prophylaxis- Ambulate regularly at home      -Disposition: Case management scheduled wound care with her [facility] office on Mon 3/15 at 1100. Patient is aware of the appointment. Discharge home this evening after her 2nd dose of antibiotics. Rxs sent to her pharmacy.

## 2021-03-12 NOTE — PROGRESS NOTES
Problem: Falls - Risk of  Goal: *Absence of Falls  Description: Document Clydene Bone Fall Risk and appropriate interventions in the flowsheet.   Outcome: Progressing Towards Goal  Note: Fall Risk Interventions:  Mobility Interventions: Patient to call before getting OOB    Mentation Interventions: Adequate sleep, hydration, pain control    Medication Interventions: Patient to call before getting OOB    Elimination Interventions: Call light in reach              Problem: Patient Education: Go to Patient Education Activity  Goal: Patient/Family Education  Outcome: Progressing Towards Goal     Problem: Pain  Goal: *Control of Pain  Outcome: Progressing Towards Goal  Goal: *PALLIATIVE CARE:  Alleviation of Pain  Outcome: Progressing Towards Goal     Problem: Patient Education: Go to Patient Education Activity  Goal: Patient/Family Education  Outcome: Progressing Towards Goal

## 2021-04-12 ENCOUNTER — HOSPITAL ENCOUNTER (OUTPATIENT)
Dept: WOUND CARE | Age: 36
Discharge: HOME | End: 2021-04-12
Payer: MEDICAID

## 2021-04-12 VITALS
HEIGHT: 67 IN | BODY MASS INDEX: 31.14 KG/M2 | SYSTOLIC BLOOD PRESSURE: 125 MMHG | DIASTOLIC BLOOD PRESSURE: 85 MMHG | TEMPERATURE: 97.5 F | HEART RATE: 93 BPM | RESPIRATION RATE: 16 BRPM | WEIGHT: 198.41 LBS

## 2021-04-12 DIAGNOSIS — S31.109A OPEN WOUND OF ANTERIOR ABDOMINAL WALL, INITIAL ENCOUNTER: ICD-10-CM

## 2021-04-12 PROCEDURE — 99203 OFFICE O/P NEW LOW 30 MIN: CPT | Performed by: SURGERY

## 2021-04-12 PROCEDURE — 17250 CHEM CAUT OF GRANLTJ TISSUE: CPT

## 2021-04-12 PROCEDURE — 74011000250 HC RX REV CODE- 250: Performed by: SURGERY

## 2021-04-12 RX ORDER — FAMOTIDINE 20 MG/1
20 TABLET, FILM COATED ORAL 2 TIMES DAILY
COMMUNITY

## 2021-04-12 RX ADMIN — Medication: at 14:45

## 2021-04-12 NOTE — WOUND CARE
04/12/21 1440   Wound Abdomen Lower #1   Date First Assessed/Time First Assessed: 04/12/21 1439   Present on Hospital Admission: Yes  Location: Abdomen  Wound Location Orientation: Lower  Wound Description: #1   Wound Image    Wound Length (cm) 0.7 cm   Wound Width (cm) 5 cm   Wound Depth (cm) 0.3 cm   Wound Surface Area (cm^2) 3.5 cm^2   Wound Volume (cm^3) 1.05 cm^3   Wound Assessment Pink/red   Drainage Amount Small   Drainage Description Serosanguinous   Wound Odor None   Blood pressure 125/85, pulse 93, temperature 97.5 °F (36.4 °C), resp. rate 16, height 5' 7\" (1.702 m), weight 90 kg (198 lb 6.6 oz), unknown if currently breastfeeding.

## 2021-04-12 NOTE — WOUND CARE
04/12/21 1508   Wound Abdomen Lower #1   Date First Assessed/Time First Assessed: 04/12/21 1439   Present on Hospital Admission: Yes  Location: Abdomen  Wound Location Orientation: Lower  Wound Description: #1   Dressing/Treatment Alginate with Ag;Gauze dressing/dressing sponge;Tape/Soft cloth adhesive tape   Discharge Condition: Stable     Pain: 4    Ambulatory Status: Walking    Discharge Destination: Home     Transportation: Car    Accompanied by: Self     Discharge instructions reviewed with Patient and copy or written instructions have been provided. All questions/concerns have been addressed at this time.

## 2021-04-26 ENCOUNTER — HOSPITAL ENCOUNTER (OUTPATIENT)
Dept: WOUND CARE | Age: 36
Discharge: HOME | End: 2021-04-26
Payer: MEDICAID

## 2021-04-26 VITALS
SYSTOLIC BLOOD PRESSURE: 130 MMHG | DIASTOLIC BLOOD PRESSURE: 76 MMHG | TEMPERATURE: 97.7 F | RESPIRATION RATE: 16 BRPM | HEART RATE: 106 BPM

## 2021-04-26 DIAGNOSIS — S31.109D OPEN WOUND OF ANTERIOR ABDOMINAL WALL, SUBSEQUENT ENCOUNTER: ICD-10-CM

## 2021-04-26 PROCEDURE — 17250 CHEM CAUT OF GRANLTJ TISSUE: CPT

## 2021-04-26 PROCEDURE — 74011000250 HC RX REV CODE- 250: Performed by: SURGERY

## 2021-04-26 PROCEDURE — 99213 OFFICE O/P EST LOW 20 MIN: CPT | Performed by: SURGERY

## 2021-04-26 RX ADMIN — Medication: at 13:48

## 2021-04-26 NOTE — WOUND CARE
04/26/21 1346   Wound Abdomen Lower #1   Date First Assessed/Time First Assessed: 04/12/21 1439   Present on Hospital Admission: Yes  Location: Abdomen  Wound Location Orientation: Lower  Wound Description: #1   Wound Image    Wound Length (cm) 0.7 cm   Wound Width (cm) 4.5 cm   Wound Depth (cm) 0.3 cm   Wound Surface Area (cm^2) 3.15 cm^2   Change in Wound Size % 10   Wound Volume (cm^3) 0.94 cm^3   Wound Healing % 10   Wound Assessment Pink/red   Drainage Amount Moderate   Drainage Description Serosanguinous   Wound Odor None   Blood pressure 130/76, pulse (!) 106, temperature 97.7 °F (36.5 °C), resp. rate 16, unknown if currently breastfeeding.

## 2021-04-26 NOTE — DISCHARGE INSTRUCTIONS
Discharge Instructions for  [facility]  [address], [address] [facility]  Telephone: [phone]    NAME:  Ramesh Castro  YOB: 1985  ACCOUNT NUMBER :  [de-identified]  DATE:  4/12/2021    DME: [facility]    Wound Cleansing:   Do not scrub or use excessive force. Cleanse wound prior to applying a clean dressing with:      [] Cleanse wound with: {ken cleansers:58215}     [] May Shower at Discharge     [x]  Shower on days of dressing change, remove dressing first    [] Keep Wound Dry in Shower (may purchase a cast cover if needed)        Dressings:           Wound Location abdomin     [x] Apply Primary Dressing:        [x] Alginate with Silver           [x] Cover and Secure with:     [x] Gauze [] Zac [] Kerlix   [] Ace Wrap [x] Cover Roll Tape [] ABD     [] Other:    Avoid contact of tape with skin. [x] Change dressing: [x] Daily    [] Every Other Day [] Three times per week   [] Once a week [] Do Not Change Dressing   [] Other:      Dietary:  [x] Increase Protein: examples ( Meat, cheese, eggs, greek yogurt, premier protein drink, fish, nuts )        Activity:  [x] Activity as tolerated:  [] Patient has no activity restrictions     [] Strict Bedrest: [] Remain off Work:     [] May return to full duty work:                                   [] Return to work with restrictions:             Return Appointment:    [] Nurse visit scheduled in *** day(s) or *** week(s)   [x] Return Appointment: With Dr. Ena Lantigua  in  2 [facility])  [] Ordered tests: {ken testing :17682}     Electronically signed on 4/12/2021 at [facility]: Should you experience any significant changes in your wound(s) or have questions about your wound care, please [facility] the [facility] [address] at [address] [place] [address] 8:00 am - 4:30.   If you need help with your wound outside these hours and cannot wait until we are again available, contact your PCP or go to the hospital emergency room. PLEASE NOTE: IF YOU ARE UNABLE TO OBTAIN WOUND SUPPLIES, CONTINUE TO USE THE SUPPLIES YOU HAVE AVAILABLE UNTIL YOU ARE ABLE TO REACH US. IT IS MOST IMPORTANT TO KEEP THE WOUND COVERED AT ALL TIMES.      Physician Signature:_______________________ Dr. Chidi Jaramillo

## 2021-04-26 NOTE — WOUND CARE
04/26/21 1424   Wound Abdomen Lower #1   Date First Assessed/Time First Assessed: 04/12/21 1439   Present on Hospital Admission: Yes  Location: Abdomen  Wound Location Orientation: Lower  Wound Description: #1   Dressing/Treatment Alginate with Ag;Gauze dressing/dressing sponge   Discharge Condition: Stable     Pain: 0    Ambulatory Status: Walking    Discharge Destination: Home     Transportation: Car    Accompanied by: Self     Discharge instructions reviewed with Patient and copy or written instructions have been provided. All questions/concerns have been addressed at this time.

## 2021-04-26 NOTE — PROGRESS NOTES
HISTORY OF PRESENT ILLNESS:  The patient is a 81-year-old woman who is referred at the [address] [place] [address] regarding nonhealing wound at a  site. The patient had  in 2021. She developed a wound infection at the  site and then was found to have an associated intraabdominal infection. She had the intraabdominal abscess drained percutaneously. She was treated with IV antibiotics. She had a wound VAC in place at the  site for a time. The patient was first seen at the [address] [place] [address] on 2021. She had been using  Dakin's solution on gauze as a wet-to-dry dressing change daily.     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 The patient reports a small quantity of drainage from the wound. She has no pain associated with wound.     The patient had gestational diabetes but did not have history of diabetes before or after the pregnancy. She had hypertension during the pregnancy.     The patient has no history of heart disease. She has no cardiac symptoms and no history of MI or coronary intervention. She has no dyspnea. She is active and ambulatory.     PAST MEDICAL HISTORY:  Remarkable for a seizure disorder (no seizure since ), hypertension, liver disease, polycystic ovarian disease.     The patient has history of gastric bypass for obesity. Current dressing as of 2021:  Aquacel AG to be applied over the wound, covered by dry gauze and tape. To be changed daily.     Reported weight 198 pounds, height 5 feet 7 inches.     PHYSICAL EXAMINATION:    Alert woman, NAD    ABDOMEN:  Examination of the patient's abdomen reveals in the low abdominal skinfold an open wound at prior  site which is 0.7 x 4.5 x 0.3 cm in dimension. The entire surface is clean granulation. There is no deep tracking.   Granulation is somewhat exuberant.       Exuberant granulation was treated with silver nitrate.     Dressing ordered:  Aquacel AG to be applied over the wound, covered by dry gauze and tape. To be changed daily.     It is okay for the patient to shower. Consider Hydrofera Blue.     The patient will follow up in [address] [place] [address] in 2 weeks.     FINAL DIAGNOSIS:  Nonhealing wound at  site.     S31.109D        Pooja Medrano MD

## 2021-05-10 ENCOUNTER — HOSPITAL ENCOUNTER (OUTPATIENT)
Dept: WOUND CARE | Age: 36
Discharge: HOME | End: 2021-05-10
Payer: MEDICAID

## 2021-05-10 VITALS
TEMPERATURE: 98.2 F | HEART RATE: 95 BPM | SYSTOLIC BLOOD PRESSURE: 137 MMHG | RESPIRATION RATE: 16 BRPM | DIASTOLIC BLOOD PRESSURE: 89 MMHG

## 2021-05-10 DIAGNOSIS — S31.109D OPEN WOUND OF ANTERIOR ABDOMINAL WALL, SUBSEQUENT ENCOUNTER: ICD-10-CM

## 2021-05-10 PROCEDURE — 74011000250 HC RX REV CODE- 250: Performed by: SURGERY

## 2021-05-10 PROCEDURE — 99213 OFFICE O/P EST LOW 20 MIN: CPT

## 2021-05-10 PROCEDURE — 99213 OFFICE O/P EST LOW 20 MIN: CPT | Performed by: SURGERY

## 2021-05-10 RX ORDER — OXYCODONE AND ACETAMINOPHEN 10; 325 MG/1; MG/1
1 TABLET ORAL
COMMUNITY
End: 2022-08-01

## 2021-05-10 RX ADMIN — LIDOCAINE HYDROCHLORIDE: 4 OINTMENT TOPICAL at 13:48

## 2021-05-10 NOTE — DISCHARGE INSTRUCTIONS
Discharge Instructions for  [facility]  [address] [place], [address] [facility]  Telephone: [phone]    NAME:  Brandon Shirley  YOB: 1985  ACCOUNT NUMBER :  [de-identified]  DATE:  4/26/2021    DME: [facility]    Wound Cleansing:   Do not scrub or use excessive force. Cleanse wound prior to applying a clean dressing with:      [] Cleanse wound with: {ken cleansers:20230}     [] May Shower at Discharge     [x]  Shower on days of dressing change, remove dressing first    [] Keep Wound Dry in Shower (may purchase a cast cover if needed)        Dressings:           Wound Location abdomin     [x] Apply Primary Dressing:        [x] Alginate with Silver           [x] Cover and Secure with:     [x] Gauze [] Zac [] Kerlix   [] Ace Wrap [x] Cover Roll Tape [] ABD     [] Other:    Avoid contact of tape with skin. [x] Change dressing: [x] Daily    [] Every Other Day [] Three times per week   [] Once a week [] Do Not Change Dressing   [] Other:      Dietary:  [x] Increase Protein: examples ( Meat, cheese, eggs, greek yogurt, premier protein drink, fish, nuts )        Activity:  [x] Activity as tolerated:  [] Patient has no activity restrictions     [] Strict Bedrest: [] Remain off Work:     [] May return to full duty work:                                                Return Appointment:    [] Nurse visit scheduled in *** day(s) or *** week(s)   [x] Return Appointment: With Dr. Rc Orosco  in  2 [facility])  [] Ordered tests: {ken testing :98251}     Electronically signed on 4/26/2021     [address] [place] [address] Information: Should you experience any significant changes in your wound(s) or have questions about your wound care, please contact the [facility] Main at [address] [place] [address] 8:00 am - 4:30.   If you need help with your wound outside these hours and cannot wait until we are again available, contact your PCP or go to the hospital emergency room. PLEASE NOTE: IF YOU ARE UNABLE TO OBTAIN WOUND SUPPLIES, CONTINUE TO USE THE SUPPLIES YOU HAVE AVAILABLE UNTIL YOU ARE ABLE TO REACH US. IT IS MOST IMPORTANT TO KEEP THE WOUND COVERED AT ALL TIMES.      Physician Signature:_______________________ Dr. Tutu Malin

## 2021-05-10 NOTE — WOUND CARE
05/10/21 1344   Wound Abdomen Lower #1   Date First Assessed/Time First Assessed: 04/12/21 1439   Present on Hospital Admission: Yes  Location: Abdomen  Wound Location Orientation: Lower  Wound Description: #1   Wound Image    Dressing Status Old drainage noted   Wound Length (cm) 0.5 cm   Wound Width (cm) 3.2 cm   Wound Depth (cm) 0.2 cm   Wound Surface Area (cm^2) 1.6 cm^2   Change in Wound Size % 54.29   Wound Volume (cm^3) 0.32 cm^3   Wound Healing % 70   Wound Assessment [place]/red;[place]   Drainage Amount Moderate   Drainage Description Serosanguinous   Wound Odor None     Visit Vitals  /89 (BP 1 Location: Left upper arm)   Pulse 95   Temp 98.2 °F (36.8 °C)   Resp 16

## 2021-05-10 NOTE — PROGRESS NOTES
HISTORY OF PRESENT ILLNESS:  The patient is a 80-year-old woman who is referred at the [address] [place] [address] regarding nonhealing wound at a  site.  The patient had  in 2021.  She developed a wound infection at the  site and then was found to have an associated intraabdominal infection.  She had the intraabdominal abscess drained percutaneously. Mk Locke was treated with IV antibiotics.  She had a wound VAC in place at the  site for a time.     The patient was first seen at the [address] [place] [address] on 2021.     She had been using  Dakin's solution on gauze as a wet-to-dry dressing change daily.     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  She has no pain associated with wound.     The patient had gestational diabetes but did not have history of diabetes before or after the pregnancy.  She had hypertension during the pregnancy.     The patient has no history of heart disease.  She has no cardiac symptoms and no history of MI or coronary intervention.  She has no dyspnea. Mk Locke is active and ambulatory.     PAST MEDICAL HISTORY: [facility] for a seizure disorder (no seizure since ), hypertension, liver disease, polycystic ovarian disease.     The patient has history of gastric bypass for obesity.     Current dressing as of 2021:  Aquacel AG to be applied over the wound, covered by dry gauze and tape.  To be changed daily.     Reported weight 198 pounds, height 5 feet 7 inches.     PHYSICAL EXAMINATION:     Alert woman, NAD     ABDOMEN:  Examination of the patient's abdomen reveals in the low abdominal skinfold an open wound at prior  site which is 0.5 x 3.2 x 0.2 cm in dimension.  The entire surface is clean granulation.  There is no deep tracking.  Granulation is somewhat exuberant.  Irritation on skin around wound.     Exuberant granulation was treated with silver nitrate. Wound improving.   Some tape irritation around wound.       Dressing ordered:  Aquacel AG to be applied over the wound, covered by silicone bordered absorbant dressing.  To be changed daily.     It is okay for the patient to shower.     Consider Hydrofera Blue.     The patient will follow up in [address] [place] [address] in 2 weeks.     FINAL DIAGNOSIS:  Nonhealing wound at  site.     S35.090D        Cassie Mcmahon MD

## 2021-05-10 NOTE — WOUND CARE
05/10/21 1417   Wound Abdomen Lower #1   Date First Assessed/Time First Assessed: 04/12/21 1439   Present on Hospital Admission: Yes  Location: Abdomen  Wound Location Orientation: Lower  Wound Description: #1   Dressing/Treatment Alginate with Ag;Foam;Gauze dressing/dressing sponge   Discharge Condition: Stable     Pain: 7    Ambulatory Status: Walking    Discharge Destination: Home     Transportation: Car    Accompanied by: Self     Discharge instructions reviewed with Patient and copy or written instructions have been provided. All questions/concerns have been addressed at this time.

## 2021-05-24 ENCOUNTER — HOSPITAL ENCOUNTER (OUTPATIENT)
Dept: WOUND CARE | Age: 36
Discharge: HOME | End: 2021-05-24
Payer: MEDICAID

## 2021-05-24 VITALS
DIASTOLIC BLOOD PRESSURE: 82 MMHG | TEMPERATURE: 98.2 F | SYSTOLIC BLOOD PRESSURE: 135 MMHG | RESPIRATION RATE: 18 BRPM | HEART RATE: 99 BPM

## 2021-05-24 PROBLEM — Z34.90 TERM PREGNANCY: Status: RESOLVED | Noted: 2021-02-25 | Resolved: 2021-05-24

## 2021-05-24 PROBLEM — Z34.90 PREGNANCY: Status: RESOLVED | Noted: 2021-02-22 | Resolved: 2021-05-24

## 2021-05-24 PROCEDURE — 99213 OFFICE O/P EST LOW 20 MIN: CPT

## 2021-05-24 PROCEDURE — 74011000250 HC RX REV CODE- 250: Performed by: SURGERY

## 2021-05-24 PROCEDURE — 99213 OFFICE O/P EST LOW 20 MIN: CPT | Performed by: SURGERY

## 2021-05-24 RX ADMIN — Medication: at 13:47

## 2021-05-24 NOTE — DISCHARGE INSTRUCTIONS
Discharge Instructions for  [facility]  [address] [place], [address] [facility]  Telephone: [phone]   FAX [phone]    NAME:  Chico Sanabria  YOB: 1985  ACCOUNT NUMBER :  [de-identified]  DATE:  5/10/2021    DME: [facility]    Wound Cleansing:   Do not scrub or use excessive force. Cleanse wound prior to applying a clean dressing with:      [] Cleanse wound with: {ken cleansers:61015}     [] May Shower at Discharge     [x]  Shower on days of dressing change, remove dressing first    [] Keep Wound Dry in Shower (may purchase a cast cover if needed)        Dressings:           Wound Location abdomin     [x] Apply Primary Dressing:        [x] Alginate with Silver      [x] Cover and Secure with:     [x] Gauze [] Zac [] Kerlix   [] Ace Wrap [] Cover Roll Tape [] ABD     [x] Other: Mepilex foam boarder   Avoid contact of tape with skin. [x] Change dressing: [x] Daily    [] Every Other Day [] Three times per week   [] Once a week [] Do Not Change Dressing   [] Other:      Dietary:  [x] Increase Protein: examples ( Meat, cheese, eggs, greek yogurt, premier protein drink, fish, nuts )      Activity:  [x] Activity as tolerated:  [] Patient has no activity restrictions     [] Strict Bedrest: [] Remain off Work:     [] May return to full duty work:                                                Return Appointment:    [] Nurse visit scheduled in *** day(s) or *** week(s)   [x] Return Appointment: With Dr. Jaycee Pastrana  in  2 [facility])  [] Ordered tests: {ken testing :60038}     Electronically signed on 5/10/2021     [address] [place] [address] Information: Should you experience any significant changes in your wound(s) or have questions about your wound care, please contact the [facility] Main at [address] [place] [address] 8:00 am - 4:30.   If you need help with your wound outside these hours and cannot wait until we are again available, contact your PCP or go to the [facility] emergency room. PLEASE NOTE: IF YOU ARE UNABLE TO OBTAIN WOUND SUPPLIES, CONTINUE TO USE THE SUPPLIES YOU HAVE AVAILABLE UNTIL YOU ARE ABLE TO REACH US. IT IS MOST IMPORTANT TO KEEP THE WOUND COVERED AT ALL TIMES.      Physician Signature:_______________________ Dr. Pj Xiao

## 2021-05-24 NOTE — WOUND CARE
05/24/21 1345   Wound Abdomen Lower #1   Date First Assessed/Time First Assessed: 04/12/21 1439   Present on Hospital Admission: Yes  Location: Abdomen  Wound Location Orientation: Lower  Wound Description: #1   Wound Image    Wound Length (cm) 0.6 cm   Wound Width (cm) 1.9 cm   Wound Depth (cm) 0.1 cm   Wound Surface Area (cm^2) 1.14 cm^2   Change in Wound Size % 67.43   Wound Volume (cm^3) 0.11 cm^3   Wound Healing % 90   Wound Assessment Pink/red   Drainage Amount Moderate   Drainage Description Serosanguinous   Wound Odor None   Rebecca-Wound/Incision Assessment Intact     Visit Vitals  /82   Pulse 99   Temp 98.2 °F (36.8 °C)   Resp 18

## 2021-05-24 NOTE — PROGRESS NOTES
HISTORY OF PRESENT ILLNESS:  The patient is a 30-year-old woman who is referred at the [address] [place] [address] regarding nonhealing wound at a  site.  The patient had  in 2021.  She developed a wound infection at the  site and then was found to have an associated intraabdominal infection.  She had the intraabdominal abscess drained percutaneously. Corinthia Goodell was treated with IV antibiotics.  She had a wound VAC in place at the  site for a time.     The patient was first seen at [facility] on 2021.     She had been using  Dakin's solution on gauze as a wet-to-dry dressing change daily.     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  She has no pain associated with wound.     The patient had gestational diabetes but did not have history of diabetes before or after the pregnancy.  She had hypertension during the pregnancy.     The patient has no history of heart disease.  She has no cardiac symptoms and no history of MI or coronary intervention.  She has no dyspnea. Corinthia Goodell is active and ambulatory.     PAST MEDICAL HISTORY: [facility] for a seizure disorder (no seizure since ), hypertension, liver disease, polycystic ovarian disease.     The patient has history of gastric bypass for obesity.     Prior dressing as of 2021:  Aquacel AG to be applied over the wound, covered by dry gauze and tape.  To be changed daily.     Current dressing as of 2021:  Aquacel AG to be applied over the wound, covered by silicone bordered absorbant dressing.  To be changed daily.     Reported weight 198 pounds, height 5 feet 7 inches.     PHYSICAL EXAMINATION:     Alert woman, NAD     ABDOMEN:  Examination of the patient's abdomen reveals in the low abdominal skinfold an open wound at prior  site which is 0.6 x 1.9 x 0.1 cm in dimension.  The entire surface is clean granulation.  There is no deep tracking.  Granulation is somewhat exuberant.       Exuberant granulation was treated with silver nitrate.        Wound improving.   Now no tape irritation around wound.        Dressing ordered:  Aquacel AG to be applied over the wound, covered by silicone bordered absorbant dressing.  To be changed daily.     It is okay for the patient to shower.     Consider Hydrofera Blue.     The patient will follow up in [address] [place] [address] in 2 weeks.     FINAL DIAGNOSIS:  Nonhealing wound at  site.     S35.854D        Hershell Libman, MD

## 2021-05-24 NOTE — WOUND CARE
05/24/21 1409   Wound Abdomen Lower #1   Date First Assessed/Time First Assessed: 04/12/21 1439   Present on Hospital Admission: Yes  Location: Abdomen  Wound Location Orientation: Lower  Wound Description: #1   Dressing/Treatment Alginate with Ag;Gauze dressing/dressing sponge; Foam   Discharge Condition: Stable     Pain: 0    Ambulatory Status: Walking    Discharge Destination: Home     Transportation: Car    Accompanied by: Self     Discharge instructions reviewed with Patient and copy or written instructions have been provided. All questions/concerns have been addressed at this time.

## 2021-06-07 ENCOUNTER — HOSPITAL ENCOUNTER (OUTPATIENT)
Dept: WOUND CARE | Age: 36
Discharge: HOME | End: 2021-06-07
Payer: MEDICAID

## 2021-06-07 VITALS
DIASTOLIC BLOOD PRESSURE: 81 MMHG | HEART RATE: 94 BPM | SYSTOLIC BLOOD PRESSURE: 125 MMHG | RESPIRATION RATE: 18 BRPM | TEMPERATURE: 97.7 F

## 2021-06-07 DIAGNOSIS — S31.109D OPEN WOUND OF ANTERIOR ABDOMINAL WALL, SUBSEQUENT ENCOUNTER: ICD-10-CM

## 2021-06-07 PROCEDURE — 99213 OFFICE O/P EST LOW 20 MIN: CPT | Performed by: SURGERY

## 2021-06-07 PROCEDURE — 99212 OFFICE O/P EST SF 10 MIN: CPT

## 2021-06-07 PROCEDURE — 74011000250 HC RX REV CODE- 250: Performed by: SURGERY

## 2021-06-07 RX ADMIN — Medication: at 14:01

## 2021-06-07 NOTE — WOUND CARE
06/07/21 1427   Wound Abdomen Lower #1   Date First Assessed/Time First Assessed: 04/12/21 1439   Present on Hospital Admission: Yes  Location: Abdomen  Wound Location Orientation: Lower  Wound Description: #1   Dressing/Treatment Alginate with Ag;Gauze dressing/dressing sponge; Foam   Discharge Condition: Stable     Pain: 0    Ambulatory Status: Walking    Discharge Destination: Home     Transportation: Car    Accompanied by: Self     Discharge instructions reviewed with Patient and copy or written instructions have been provided. All questions/concerns have been addressed at this time.

## 2021-06-07 NOTE — DISCHARGE INSTRUCTIONS
Discharge Instructions for  [facility]  [place] [address], [address] [facility]  Telephone: [phone]   FAX [phone]    NAME:  Edouard Hobbs  YOB: 1985  ACCOUNT NUMBER :  [de-identified]  DATE:  5/24/2021    DME: [facility]    Wound Cleansing:   Do not scrub or use excessive force. Cleanse wound prior to applying a clean dressing with:      [] Cleanse wound with: {ken cleansers:58725}     [] May Shower at Discharge     [x]  Shower on days of dressing change, remove dressing first    [] Keep Wound Dry in Shower (may purchase a cast cover if needed)        Dressings:           Wound Location abdomin     [x] Apply Primary Dressing:        [x] Alginate with Silver      [x] Cover and Secure with:     [x] Gauze [] Zac [] Kerlix   [] Ace Wrap [] Cover Roll Tape [] ABD     [x] Other: Mepilex foam boarder   Avoid contact of tape with skin. [x] Change dressing: [] Daily    [x] Every Other Day [] Three times per week   [] Once a week [] Do Not Change Dressing   [] Other:      Dietary:  [x] Increase Protein: examples ( Meat, cheese, eggs, greek yogurt, premier protein drink, fish, nuts )      Activity:  [x] Activity as tolerated:  [] Patient has no activity restrictions     [] Strict Bedrest: [] Remain off Work:     [] May return to full duty work:                                                Return Appointment:    [] Nurse visit scheduled in *** day(s) or *** week(s)   [x] Return Appointment: With Dr. Jaycee Bose  in  [facility])  [] Ordered tests: {ken testing :01806}     Electronically signed on 5/24/2021     [address] Information: Should you experience any significant changes in your wound(s) or have questions about your wound care, please contact the [facility] at [address] [place] [address] 8:00 am - 4:30.   If you need help with your wound outside these hours and cannot wait until we are again available, contact your PCP or go to the hospital emergency room. PLEASE NOTE: IF YOU ARE UNABLE TO OBTAIN WOUND SUPPLIES, CONTINUE TO USE THE SUPPLIES YOU HAVE AVAILABLE UNTIL YOU ARE ABLE TO REACH US. IT IS MOST IMPORTANT TO KEEP THE WOUND COVERED AT ALL TIMES.      Physician Signature:_______________________ Dr. Chi Choudhary

## 2021-06-07 NOTE — WOUND CARE
06/07/21 1400   Wound Abdomen Lower #1   Date First Assessed/Time First Assessed: 04/12/21 1439   Present on Hospital Admission: Yes  Location: Abdomen  Wound Location Orientation: Lower  Wound Description: #1   Wound Image    Wound Length (cm) 0.5 cm   Wound Width (cm) 0.4 cm   Wound Depth (cm) 0.1 cm   Wound Surface Area (cm^2) 0.2 cm^2   Change in Wound Size % 94.29   Wound Volume (cm^3) 0.02 cm^3   Wound Healing % 98   Wound Assessment Pink/red   Drainage Amount Small   Drainage Description Serosanguinous   Wound Odor None   Rebecca-Wound/Incision Assessment Intact     Visit Vitals  /81   Pulse 94   Temp 97.7 °F (36.5 °C)   Resp 18

## 2021-06-07 NOTE — DISCHARGE INSTRUCTIONS
Discharge Instructions for  [address]  [place] [address], [address] [facility]  Telephone: [phone]   FAX [phone]    NAME:  Michelle Perla  YOB: 1985  ACCOUNT NUMBER :  [de-identified]  DATE:  6/7/2021    DME: [facility]    Wound Cleansing:   Do not scrub or use excessive force. Cleanse wound prior to applying a clean dressing with:      [] Cleanse wound with: {ken cleansers:79930}     [] May Shower at Discharge     [x]  Shower on days of dressing change, remove dressing first    [] Keep Wound Dry in Shower (may purchase a cast cover if needed)        Dressings:           Wound Location abdomin     [x] Apply Primary Dressing:        [x] Alginate with Silver      [x] Cover and Secure with:     [x] Gauze [] Zac [] Kerlix   [] Ace Wrap [] Cover Roll Tape [] ABD     [x] Other: Mepilex foam boarder   Avoid contact of tape with skin. [x] Change dressing: [] Daily    [x] Every Other Day [] Three times per week   [] Once a week [] Do Not Change Dressing   [] Other:      Dietary:  [x] Increase Protein: examples ( Meat, cheese, eggs, greek yogurt, premier protein drink, fish, nuts )      Activity:  [x] Activity as tolerated:  [] Patient has no activity restrictions     [] Strict Bedrest: [] Remain off Work:     [] May return to full duty work:                                                Return Appointment:    [] Nurse visit scheduled in *** day(s) or *** week(s)   [x] Return Appointment: With Dr. Thad Lloyd  in  2 [facility])  [] Ordered tests: {ken testing :99160}     Electronically signed on 6/7/2021     [address] [place] [address] Information: Should you experience any significant changes in your wound(s) or have questions about your wound care, please contact the [address] [place] [address] at [address] [place] [address] 8:00 am - 4:30.   If you need help with your wound outside these hours and cannot wait until we are again available, contact your PCP or go to the hospital emergency room. PLEASE NOTE: IF YOU ARE UNABLE TO OBTAIN WOUND SUPPLIES, CONTINUE TO USE THE SUPPLIES YOU HAVE AVAILABLE UNTIL YOU ARE ABLE TO REACH US. IT IS MOST IMPORTANT TO KEEP THE WOUND COVERED AT ALL TIMES.      Physician Signature:_______________________ Dr. Chi Choudhary

## 2021-06-21 NOTE — WOUND CARE
Patient canceled appt for today, states wound is healed, requesting what she should put on the wound since the scar area is still thin, per Dr. Catrachito Nuñez continue with the aquacel x1 week and then leave open to air.

## 2021-12-17 ENCOUNTER — TELEPHONE (OUTPATIENT)
Dept: FAMILY MEDICINE CLINIC | Age: 36
End: 2021-12-17

## 2022-03-18 PROBLEM — S31.109A WOUND, OPEN, ANTERIOR ABDOMINAL WALL: Status: ACTIVE | Noted: 2021-04-12

## 2022-03-19 PROBLEM — F32.A DEPRESSION: Status: ACTIVE | Noted: 2021-02-22

## 2022-03-19 PROBLEM — O16.3 HYPERTENSION AFFECTING PREGNANCY, THIRD TRIMESTER: Status: ACTIVE | Noted: 2021-02-22

## 2022-06-21 ENCOUNTER — NURSE TRIAGE (OUTPATIENT)
Dept: OTHER | Facility: CLINIC | Age: 37
End: 2022-06-21

## 2022-06-21 NOTE — TELEPHONE ENCOUNTER
Received call from [place] at [facility] with Red Flag Complaint. Subjective: Caller states \"I was seen in the ER on 6/11 for back pain, calling today b/c it is not getting any better. Patient noted that she was dx with kidney stones while in the ER. \"     Current Symptoms: Right lower back pain, intermittent radiation around to right groin    Patient denies CP, SOB, difficulty breathing, weakness, numbness, problems with bowel/bladder control, burning with urination, or blood in urine. Onset: 1 month ago; gradual    Associated Symptoms: reduced activity, difficulty sleeping due pain    Pain Severity: 8/10; contractions feeling; intermittent; intensity varies - worse with lifting    Temperature: NA Patient denies feeling feverish, chills, or sweats    What has been tried: Steroids    LMP: 6/20/2021 Pregnant: No    Recommended disposition: See in Office Today - Instructed to go to [address] or ED     Care advice provided, patient verbalizes understanding; denies any other questions or concerns; instructed to call back for any new or worsening symptoms. Patient states she will attempt to [address], has new patient appt on 7/5/2022     Patient noted transportation issues. Attention Provider: Thank you for allowing me to participate in the care of your patient. The patient was connected to triage in response to information provided to the [facility]. Please do not respond through this encounter as the response is not directed to a shared pool.     Reason for Disposition   SEVERE back pain (e.g., excruciating, unable to do any normal activities) and not improved after pain medicine and CARE ADVICE    Protocols used: BACK PAIN-ADULT-[place]

## 2022-07-07 ENCOUNTER — TELEPHONE (OUTPATIENT)
Dept: FAMILY MEDICINE CLINIC | Age: 37
End: 2022-07-07

## 2022-07-18 ENCOUNTER — TRANSCRIBE ORDER (OUTPATIENT)
Dept: SCHEDULING | Age: 37
End: 2022-07-18

## 2022-07-18 DIAGNOSIS — M54.16 LUMBAR RADICULOPATHY: Primary | ICD-10-CM

## 2022-07-22 ENCOUNTER — HOSPITAL ENCOUNTER (OUTPATIENT)
Dept: CT IMAGING | Age: 37
Discharge: HOME | End: 2022-07-22
Payer: MEDICAID

## 2022-07-22 DIAGNOSIS — M54.16 LUMBAR RADICULOPATHY: ICD-10-CM

## 2022-07-22 PROCEDURE — 72131 CT LUMBAR SPINE W/O DYE: CPT

## 2022-08-01 ENCOUNTER — OFFICE VISIT (OUTPATIENT)
Dept: FAMILY MEDICINE CLINIC | Age: 37
End: 2022-08-01
Payer: MEDICAID

## 2022-08-01 VITALS
WEIGHT: 231 LBS | OXYGEN SATURATION: 97 % | DIASTOLIC BLOOD PRESSURE: 102 MMHG | TEMPERATURE: 98.3 F | HEIGHT: 65 IN | SYSTOLIC BLOOD PRESSURE: 140 MMHG | BODY MASS INDEX: 38.49 KG/M2 | HEART RATE: 96 BPM | RESPIRATION RATE: 15 BRPM

## 2022-08-01 DIAGNOSIS — F41.9 ANXIETY: ICD-10-CM

## 2022-08-01 DIAGNOSIS — R73.9 ELEVATED BLOOD SUGAR: ICD-10-CM

## 2022-08-01 DIAGNOSIS — R03.0 ELEVATED BLOOD-PRESSURE READING WITHOUT DIAGNOSIS OF HYPERTENSION: Primary | ICD-10-CM

## 2022-08-01 DIAGNOSIS — Z79.899 ENCOUNTER FOR LONG-TERM (CURRENT) USE OF OTHER MEDICATIONS: ICD-10-CM

## 2022-08-01 DIAGNOSIS — Z13.220 SCREENING FOR LIPID DISORDERS: ICD-10-CM

## 2022-08-01 DIAGNOSIS — F32.0 CURRENT MILD EPISODE OF MAJOR DEPRESSIVE DISORDER, UNSPECIFIED WHETHER RECURRENT (HCC): ICD-10-CM

## 2022-08-01 DIAGNOSIS — F17.200 NICOTINE DEPENDENCE WITH CURRENT USE: ICD-10-CM

## 2022-08-01 PROBLEM — M79.7 FIBROMYALGIA: Status: ACTIVE | Noted: 2022-08-01

## 2022-08-01 PROBLEM — K44.9 HIATAL HERNIA: Status: ACTIVE | Noted: 2022-08-01

## 2022-08-01 PROBLEM — J45.909 ASTHMA: Status: ACTIVE | Noted: 2022-08-01

## 2022-08-01 PROBLEM — M54.50 LOW BACK PAIN: Status: ACTIVE | Noted: 2022-08-01

## 2022-08-01 PROBLEM — F43.10 POSTTRAUMATIC STRESS DISORDER: Status: ACTIVE | Noted: 2022-08-01

## 2022-08-01 PROBLEM — G47.33 OBSTRUCTIVE SLEEP APNEA: Status: RESOLVED | Noted: 2022-08-01 | Resolved: 2022-08-01

## 2022-08-01 PROBLEM — S31.109A WOUND, OPEN, ANTERIOR ABDOMINAL WALL: Status: RESOLVED | Noted: 2021-04-12 | Resolved: 2022-08-01

## 2022-08-01 PROBLEM — O16.3 HYPERTENSION AFFECTING PREGNANCY, THIRD TRIMESTER: Status: RESOLVED | Noted: 2021-02-22 | Resolved: 2022-08-01

## 2022-08-01 PROBLEM — R56.9 SEIZURE (HCC): Status: RESOLVED | Noted: 2022-08-01 | Resolved: 2022-08-01

## 2022-08-01 PROBLEM — F12.10 CANNABIS ABUSE: Status: ACTIVE | Noted: 2022-08-01

## 2022-08-01 PROBLEM — G43.009 MIGRAINE WITHOUT AURA: Status: ACTIVE | Noted: 2022-08-01

## 2022-08-01 PROBLEM — G40.309 GENERALIZED CONVULSIVE EPILEPSY (HCC): Status: RESOLVED | Noted: 2022-08-01 | Resolved: 2022-08-01

## 2022-08-01 PROBLEM — K76.0 STEATOSIS OF LIVER: Status: ACTIVE | Noted: 2022-08-01

## 2022-08-01 PROBLEM — G47.33 OBSTRUCTIVE SLEEP APNEA: Status: ACTIVE | Noted: 2022-08-01

## 2022-08-01 PROBLEM — Z97.5 PRESENCE OF INTRAUTERINE CONTRACEPTIVE DEVICE: Status: ACTIVE | Noted: 2022-08-01

## 2022-08-01 PROBLEM — G40.309 GENERALIZED CONVULSIVE EPILEPSY (HCC): Status: ACTIVE | Noted: 2022-08-01

## 2022-08-01 PROBLEM — R56.9 SEIZURE (HCC): Status: ACTIVE | Noted: 2022-08-01

## 2022-08-01 LAB — HBA1C MFR BLD HPLC: 6.9 %

## 2022-08-01 PROCEDURE — 99204 OFFICE O/P NEW MOD 45 MIN: CPT | Performed by: FAMILY MEDICINE

## 2022-08-01 PROCEDURE — 83036 HEMOGLOBIN GLYCOSYLATED A1C: CPT | Performed by: FAMILY MEDICINE

## 2022-08-01 RX ORDER — GABAPENTIN 300 MG/1
600 CAPSULE ORAL 3 TIMES DAILY
COMMUNITY
Start: 2022-07-06 | End: 2022-08-17

## 2022-08-01 RX ORDER — BUSPIRONE HYDROCHLORIDE 10 MG/1
10 TABLET ORAL DAILY
COMMUNITY
Start: 2022-07-06

## 2022-08-01 RX ORDER — TIZANIDINE 4 MG/1
4 TABLET ORAL 2 TIMES DAILY
COMMUNITY
Start: 2022-07-06 | End: 2022-08-17

## 2022-08-01 RX ORDER — NYSTATIN 100000 [USP'U]/ML
SUSPENSION ORAL
COMMUNITY
Start: 2022-07-06

## 2022-08-01 RX ORDER — VARENICLINE TARTRATE 0.5 MG/1
TABLET, FILM COATED ORAL
Qty: 60 TABLET | Refills: 0 | Status: SHIPPED | OUTPATIENT
Start: 2022-08-01 | End: 2022-08-30

## 2022-08-01 NOTE — LETTER
8/1/2022 9:19 AM    Ms[address] [facility] [address]          Dear Dr. Dago Pemberton,     Please fax us the most recent office visit note and labs so that we may update the patient's records for continuity of care.      Our fax number: [phone]       Patient:   Anna Dailey  1985                  Sincerely,      Khadra Landon MD

## 2022-08-01 NOTE — PROGRESS NOTES
1. \"Have you been to the ER, urgent care clinic since your last visit? Hospitalized since your last visit? \" Yes- [address]     2. \"Have you seen or consulted any other health care providers outside of the [address] [place] [address] since your last visit? \" No     3. For patients aged 39-70: Has the patient had a colonoscopy / FIT/ Cologuard? No      If the patient is female:    4. For patients aged 41-77: Has the patient had a mammogram within the past 2 years? No      5. For patients aged 21-65: Has the patient had a pap smear? Yes - Care Gap present.  Rooming MA/LPN to request most recent results  [address] [facility]     Health Maintenance Due   Topic Date Due    Hepatitis C Screening  Never done    COVID-19 Vaccine (1) Never done    Depression Monitoring  Never done    Cervical cancer screen  Never done

## 2022-08-01 NOTE — PATIENT INSTRUCTIONS
DASH Diet: Care Instructions  Your Care Instructions     The DASH diet is an eating plan that can help lower your blood pressure. DASH stands for Dietary Approaches to Stop Hypertension. Hypertension is high blood pressure. The DASH diet focuses on eating foods that are high in calcium, potassium, and magnesium. These nutrients can lower blood pressure. The foods that are highest in these nutrients are fruits, vegetables, low-fat dairy products, nuts, seeds, and legumes. But taking calcium, potassium, and magnesium supplements instead of eating foods that are high in those nutrients does not have the same effect. The DASH diet also includes whole grains, fish, and poultry. The DASH diet is one of several lifestyle changes your doctor may recommend to lower your high blood pressure. Your doctor may also want you to decrease the amount of sodium in your diet. Lowering sodium while following the DASH diet can lower blood pressure even further than just the DASH diet alone. Follow-up care is a key part of your treatment and safety. Be sure to make and go to all appointments, and call your doctor if you are having problems. It's also a good idea to know your test results and keep a list of the medicines you take. How can you care for yourself at home? Following the DASH diet  Eat 4 to 5 servings of fruit each day. A serving is 1 medium-sized piece of fruit, ½ cup chopped or canned fruit, 1/4 cup dried fruit, or 4 ounces (½ cup) of fruit juice. Choose fruit more often than fruit juice. Eat 4 to 5 servings of vegetables each day. A serving is 1 cup of lettuce or raw leafy vegetables, ½ cup of chopped or cooked vegetables, or 4 ounces (½ cup) of vegetable juice. Choose vegetables more often than vegetable juice. Get 2 to 3 servings of low-fat and fat-free dairy each day. A serving is 8 ounces of milk, 1 cup of yogurt, or 1 ½ ounces of cheese. Eat 6 to 8 servings of grains each day.  A serving is 1 slice of bread, 1 ounce of dry cereal, or ½ cup of cooked rice, pasta, or cooked cereal. Try to choose whole-grain products as much as possible. Limit lean meat, poultry, and fish to 2 servings each day. A serving is 3 ounces, about the size of a deck of cards. Eat 4 to 5 servings of nuts, seeds, and legumes (cooked dried beans, lentils, and split peas) each week. A serving is 1/3 cup of nuts, 2 tablespoons of seeds, or ½ cup of cooked beans or peas. Limit fats and oils to 2 to 3 servings each day. A serving is 1 teaspoon of vegetable oil or 2 tablespoons of salad dressing. Limit sweets and added sugars to 5 servings or less a week. A serving is 1 tablespoon jelly or jam, ½ cup sorbet, or 1 cup of lemonade. Eat less than 2,300 milligrams (mg) of sodium a day. If you limit your sodium to 1,500 mg a day, you can lower your blood pressure even more. Be aware that all of these are the suggested number of servings for people who eat 1,800 to 2,000 calories a day. Your recommended number of servings may be different if you need more or fewer calories. Tips for success  Start small. Do not try to make dramatic changes to your diet all at once. You might feel that you are missing out on your favorite foods and then be more likely to not follow the plan. Make small changes, and stick with them. Once those changes become habit, add a few more changes. Try some of the following:  Make it a goal to eat a fruit or vegetable at every meal and at snacks. This will make it easy to get the recommended amount of fruits and vegetables each day. Try yogurt topped with fruit and nuts for a snack or healthy dessert. Add lettuce, tomato, cucumber, and onion to sandwiches. Combine a ready-made pizza crust with low-fat mozzarella cheese and lots of vegetable toppings. Try using tomatoes, squash, spinach, broccoli, carrots, cauliflower, and onions.   Have a variety of cut-up vegetables with a low-fat dip as an appetizer instead of chips and dip.  Sprinkle sunflower seeds or chopped almonds over salads. Or try adding chopped walnuts or almonds to cooked vegetables. Try some vegetarian meals using beans and peas. Add garbanzo or kidney beans to salads. Make burritos and tacos with mashed jhaveri beans or black beans. Where can you learn more? Go to http://www.[employer].com/  Enter H967 in the search box to learn more about \"DASH Diet: Care Instructions. \"  Current as of: January 10, 2022               Content Version: 13.2  © 2440-1218 [employer]. Care instructions adapted under license by [employer] (which disclaims liability or warranty for this information). If you have questions about a medical condition or this instruction, always ask your healthcare professional. [address] any warranty or liability for your use of this information.
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
## 2022-08-03 LAB
ALBUMIN SERPL-MCNC: 4.5 G/DL (ref 3.8–4.8)
ALBUMIN/GLOB SERPL: 2 {RATIO} (ref 1.2–2.2)
ALP SERPL-CCNC: 83 IU/L (ref 44–121)
ALT SERPL-CCNC: 12 IU/L (ref 0–32)
AST SERPL-CCNC: 13 IU/L (ref 0–40)
BASOPHILS # BLD AUTO: 0.1 X10E3/UL (ref 0–0.2)
BASOPHILS NFR BLD AUTO: 1 %
BILIRUB SERPL-MCNC: <0.2 MG/DL (ref 0–1.2)
BUN SERPL-MCNC: 6 MG/DL (ref 6–20)
BUN/CREAT SERPL: 8 (ref 9–23)
CALCIUM SERPL-MCNC: 9.3 MG/DL (ref 8.7–10.2)
CHLORIDE SERPL-SCNC: 106 MMOL/L (ref 96–106)
CHOLEST SERPL-MCNC: 229 MG/DL (ref 100–199)
CO2 SERPL-SCNC: 16 MMOL/L (ref 20–29)
CREAT SERPL-MCNC: 0.79 MG/DL (ref 0.57–1)
EGFR: 100 ML/MIN/1.73
EOSINOPHIL # BLD AUTO: 0.4 X10E3/UL (ref 0–0.4)
EOSINOPHIL NFR BLD AUTO: 2 %
ERYTHROCYTE [DISTWIDTH] IN BLOOD BY AUTOMATED COUNT: 14.1 % (ref 11.7–15.4)
GLOBULIN SER CALC-MCNC: 2.3 G/DL (ref 1.5–4.5)
GLUCOSE SERPL-MCNC: 162 MG/DL (ref 65–99)
HCT VFR BLD AUTO: 48.9 % (ref 34–46.6)
HDLC SERPL-MCNC: 32 MG/DL
HGB BLD-MCNC: 15.4 G/DL (ref 11.1–15.9)
IMM GRANULOCYTES # BLD AUTO: 0.1 X10E3/UL (ref 0–0.1)
IMM GRANULOCYTES NFR BLD AUTO: 1 %
LDLC SERPL CALC-MCNC: 162 MG/DL (ref 0–99)
LYMPHOCYTES # BLD AUTO: 4.3 X10E3/UL (ref 0.7–3.1)
LYMPHOCYTES NFR BLD AUTO: 27 %
MCH RBC QN AUTO: 30.3 PG (ref 26.6–33)
MCHC RBC AUTO-ENTMCNC: 31.5 G/DL (ref 31.5–35.7)
MCV RBC AUTO: 96 FL (ref 79–97)
MONOCYTES # BLD AUTO: 0.7 X10E3/UL (ref 0.1–0.9)
MONOCYTES NFR BLD AUTO: 5 %
NEUTROPHILS # BLD AUTO: 10.5 X10E3/UL (ref 1.4–7)
NEUTROPHILS NFR BLD AUTO: 64 %
PLATELET # BLD AUTO: 445 X10E3/UL (ref 150–450)
POTASSIUM SERPL-SCNC: 4.4 MMOL/L (ref 3.5–5.2)
PROT SERPL-MCNC: 6.8 G/DL (ref 6–8.5)
RBC # BLD AUTO: 5.08 X10E6/UL (ref 3.77–5.28)
SODIUM SERPL-SCNC: 142 MMOL/L (ref 134–144)
TRIGL SERPL-MCNC: 188 MG/DL (ref 0–149)
VLDLC SERPL CALC-MCNC: 35 MG/DL (ref 5–40)
WBC # BLD AUTO: 16.1 X10E3/UL (ref 3.4–10.8)

## 2022-08-08 ENCOUNTER — OFFICE VISIT (OUTPATIENT)
Dept: FAMILY MEDICINE CLINIC | Age: 37
End: 2022-08-08
Payer: MEDICAID

## 2022-08-08 VITALS
OXYGEN SATURATION: 98 % | WEIGHT: 232.4 LBS | HEIGHT: 65 IN | TEMPERATURE: 97.4 F | HEART RATE: 91 BPM | SYSTOLIC BLOOD PRESSURE: 146 MMHG | BODY MASS INDEX: 38.72 KG/M2 | RESPIRATION RATE: 22 BRPM | DIASTOLIC BLOOD PRESSURE: 97 MMHG

## 2022-08-08 DIAGNOSIS — I10 BENIGN HYPERTENSION: Primary | ICD-10-CM

## 2022-08-08 DIAGNOSIS — M54.42 CHRONIC BILATERAL LOW BACK PAIN WITH BILATERAL SCIATICA: ICD-10-CM

## 2022-08-08 DIAGNOSIS — G89.29 CHRONIC BILATERAL LOW BACK PAIN WITH BILATERAL SCIATICA: ICD-10-CM

## 2022-08-08 DIAGNOSIS — M54.41 CHRONIC BILATERAL LOW BACK PAIN WITH BILATERAL SCIATICA: ICD-10-CM

## 2022-08-08 PROCEDURE — 99213 OFFICE O/P EST LOW 20 MIN: CPT | Performed by: FAMILY MEDICINE

## 2022-08-08 RX ORDER — DULOXETIN HYDROCHLORIDE 30 MG/1
30 CAPSULE, DELAYED RELEASE ORAL DAILY
Qty: 30 CAPSULE | Refills: 1 | Status: SHIPPED | OUTPATIENT
Start: 2022-08-08

## 2022-08-08 RX ORDER — AMLODIPINE BESYLATE 5 MG/1
5 TABLET ORAL DAILY
Qty: 30 TABLET | Refills: 1 | Status: SHIPPED | OUTPATIENT
Start: 2022-08-08 | End: 2022-09-12
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
## 2022-08-08 NOTE — PROGRESS NOTES
1. \"Have you been to the ER, urgent care clinic since your last visit? Hospitalized since your last visit? \" No    2. \"Have you seen or consulted any other health care providers outside of the [address] [place] [address] since your last visit? \" No     3. For patients aged 39-70: Has the patient had a colonoscopy / FIT/ Cologuard? NA - based on age      If the patient is female:    4. For patients aged 41-77: Has the patient had a mammogram within the past 2 years? NA - based on age or sex      11. For patients aged 21-65: Has the patient had a pap smear?  No    Health Maintenance Due   Topic Date Due    Hepatitis C Screening  Never done    Pneumococcal 0-64 years (1 - PCV) Never done    COVID-19 Vaccine (3 - Booster) 02/23/2022

## 2022-08-08 NOTE — PATIENT INSTRUCTIONS
Reduce citalopram to 20 mg once daily x 2-3 days then stop and start cymbalta after 24-48 hrs off citalopram.

## 2022-08-15 ENCOUNTER — TELEPHONE (OUTPATIENT)
Dept: FAMILY MEDICINE CLINIC | Age: 37
End: 2022-08-15

## 2022-08-15 RX ORDER — KETOCONAZOLE 20 MG/G
CREAM TOPICAL 2 TIMES DAILY
Qty: 30 G | Refills: 1 | Status: SHIPPED | OUTPATIENT
Start: 2022-08-15 | End: 2022-08-25

## 2022-08-15 NOTE — TELEPHONE ENCOUNTER
Patient called and notified medication sent to her pharmacy for ketoconazole. Verbalizes understanding and thanked for call.

## 2022-08-15 NOTE — TELEPHONE ENCOUNTER
----- Message from Leanne Cheng sent at 8/15/2022  9:55 AM EDT -----  Subject: Appointment Request    Reason for Call: Established Patient Appointment needed: Urgent (Patient   Request) Hospital Follow Up    QUESTIONS    Reason for appointment request? Available appointments did not meet   patient need     Additional Information for Provider? Patient needs to be seen urgently. She was in the Hospital from 08/11 to 08/12 for Sepsis. The soonest   appointment for a hosp f/u was 09/13 with Kenneth Little.  The patient is also   requesting medication for anal itching.  ---------------------------------------------------------------------------  --------------  [employer] INFO  [phone]; OK to leave message on voicemail  ---------------------------------------------------------------------------  --------------  SCRIPT ANSWERS  COVID Screen: Bina Matthews

## 2022-08-15 NOTE — TELEPHONE ENCOUNTER
Patient called regarding hospital follow up. Appt scheduled. Requesting medication for yeast infection- states anal itching is from yeast. Reports leaving hospital AMA due to being single mother and being treated for Urosepsis.

## 2022-08-17 ENCOUNTER — OFFICE VISIT (OUTPATIENT)
Dept: FAMILY MEDICINE CLINIC | Age: 37
End: 2022-08-17
Payer: MEDICAID

## 2022-08-17 VITALS
TEMPERATURE: 98.8 F | SYSTOLIC BLOOD PRESSURE: 131 MMHG | OXYGEN SATURATION: 97 % | WEIGHT: 232.2 LBS | HEART RATE: 103 BPM | RESPIRATION RATE: 18 BRPM | HEIGHT: 65 IN | BODY MASS INDEX: 38.69 KG/M2 | DIASTOLIC BLOOD PRESSURE: 86 MMHG

## 2022-08-17 DIAGNOSIS — R11.2 NAUSEA AND VOMITING, UNSPECIFIED VOMITING TYPE: ICD-10-CM

## 2022-08-17 DIAGNOSIS — G89.29 CHRONIC BILATERAL LOW BACK PAIN WITH BILATERAL SCIATICA: ICD-10-CM

## 2022-08-17 DIAGNOSIS — Z20.822 EXPOSURE TO 2019 NOVEL CORONAVIRUS: ICD-10-CM

## 2022-08-17 DIAGNOSIS — E87.6 HYPOKALEMIA: ICD-10-CM

## 2022-08-17 DIAGNOSIS — N39.0 URINARY TRACT INFECTION WITHOUT HEMATURIA, SITE UNSPECIFIED: Primary | ICD-10-CM

## 2022-08-17 DIAGNOSIS — E83.39 HYPOPHOSPHATEMIA: ICD-10-CM

## 2022-08-17 DIAGNOSIS — R50.9 FEVER, UNSPECIFIED FEVER CAUSE: ICD-10-CM

## 2022-08-17 DIAGNOSIS — M54.42 CHRONIC BILATERAL LOW BACK PAIN WITH BILATERAL SCIATICA: ICD-10-CM

## 2022-08-17 DIAGNOSIS — R73.9 ELEVATED BLOOD SUGAR: ICD-10-CM

## 2022-08-17 DIAGNOSIS — M54.41 CHRONIC BILATERAL LOW BACK PAIN WITH BILATERAL SCIATICA: ICD-10-CM

## 2022-08-17 DIAGNOSIS — N20.0 NEPHROLITHIASIS: ICD-10-CM

## 2022-08-17 LAB
BILIRUB UR QL STRIP: NEGATIVE
GLUCOSE UR-MCNC: NEGATIVE MG/DL
KETONES P FAST UR STRIP-MCNC: NORMAL MG/DL
PH UR STRIP: 6 [PH] (ref 4.6–8)
PROT UR QL STRIP: NORMAL
QUICKVUE INFLUENZA TEST: NEGATIVE
SP GR UR STRIP: 1.02 (ref 1–1.03)
UA UROBILINOGEN AMB POC: NORMAL (ref 0.2–1)
URINALYSIS CLARITY POC: CLEAR
URINALYSIS COLOR POC: NORMAL
URINE BLOOD POC: NORMAL
URINE LEUKOCYTES POC: NEGATIVE
URINE NITRITES POC: NEGATIVE
VALID INTERNAL CONTROL?: YES

## 2022-08-17 PROCEDURE — 81003 URINALYSIS AUTO W/O SCOPE: CPT | Performed by: FAMILY MEDICINE

## 2022-08-17 PROCEDURE — 87804 INFLUENZA ASSAY W/OPTIC: CPT | Performed by: FAMILY MEDICINE

## 2022-08-17 PROCEDURE — 96372 THER/PROPH/DIAG INJ SC/IM: CPT | Performed by: FAMILY MEDICINE

## 2022-08-17 PROCEDURE — 99214 OFFICE O/P EST MOD 30 MIN: CPT | Performed by: FAMILY MEDICINE

## 2022-08-17 RX ORDER — METHOCARBAMOL 500 MG/1
500 TABLET, FILM COATED ORAL 3 TIMES DAILY
Qty: 30 TABLET | Refills: 0 | Status: SHIPPED | OUTPATIENT
Start: 2022-08-17

## 2022-08-17 RX ORDER — ONDANSETRON 4 MG/1
4 TABLET, ORALLY DISINTEGRATING ORAL
Qty: 20 TABLET | Refills: 0 | Status: SHIPPED | OUTPATIENT
Start: 2022-08-17

## 2022-08-17 RX ORDER — KETOROLAC TROMETHAMINE 30 MG/ML
30 INJECTION, SOLUTION INTRAMUSCULAR; INTRAVENOUS ONCE
Status: COMPLETED | OUTPATIENT
Start: 2022-08-17 | End: 2022-08-17

## 2022-08-17 RX ORDER — CIPROFLOXACIN 500 MG/1
500 TABLET ORAL 2 TIMES DAILY
COMMUNITY
Start: 2022-08-12 | End: 2022-08-19

## 2022-08-17 RX ADMIN — KETOROLAC TROMETHAMINE 30 MG: 30 INJECTION, SOLUTION INTRAMUSCULAR; INTRAVENOUS at 16:57
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
## 2022-08-17 NOTE — PROGRESS NOTES
1. Have you been to the ER, urgent care clinic since your last visit? Hospitalized since your last visit? Yes [address]     2. Have you seen or consulted any other health care providers outside of the [address] [place] [address] since your last visit? Including any pap smears or colon screening.  No      Health Maintenance Due   Topic Date Due    Hepatitis C Screening  Never done    Pneumococcal 0-64 years (1 - PCV) Never done    COVID-19 Vaccine (3 - Booster) 02/23/2022

## 2022-08-17 NOTE — LETTER
NOTIFICATION RETURN TO WORK / SCHOOL    8/17/2022 4:48 PM    Ms. Ron Cochran  [facility]  [phone]      To Whom It May Concern:    Ron Cochran is currently under the care of Sadie Marrero. Accompanied by mother Evaristo Irwin. Please excuse Mrs. Roldan from work. She will return to work/school on: 8/17/2022. If there are questions or concerns please have the patient contact our office.         Sincerely,      Bar Chandra MD

## 2022-08-19 ENCOUNTER — TELEPHONE (OUTPATIENT)
Dept: FAMILY MEDICINE CLINIC | Age: 37
End: 2022-08-19

## 2022-08-19 LAB
ANION GAP SERPL CALC-SCNC: 10 MMOL/L (ref 5–15)
BASOPHILS # BLD: 0 K/UL (ref 0–0.1)
BASOPHILS NFR BLD: 1 % (ref 0–1)
BUN SERPL-MCNC: 12 MG/DL (ref 6–20)
BUN/CREAT SERPL: 13 (ref 12–20)
CALCIUM SERPL-MCNC: 8.8 MG/DL (ref 8.5–10.1)
CHLORIDE SERPL-SCNC: 105 MMOL/L (ref 97–108)
CO2 SERPL-SCNC: 25 MMOL/L (ref 21–32)
CREAT SERPL-MCNC: 0.94 MG/DL (ref 0.55–1.02)
DIFFERENTIAL METHOD BLD: ABNORMAL
EOSINOPHIL # BLD: 0 K/UL (ref 0–0.4)
EOSINOPHIL NFR BLD: 0 % (ref 0–7)
ERYTHROCYTE [DISTWIDTH] IN BLOOD BY AUTOMATED COUNT: 14.8 % (ref 11.5–14.5)
GLUCOSE SERPL-MCNC: 224 MG/DL (ref 65–100)
HCT VFR BLD AUTO: 47.7 % (ref 35–47)
HGB BLD-MCNC: 15.2 G/DL (ref 11.5–16)
IMM GRANULOCYTES # BLD AUTO: 0 K/UL (ref 0–0.04)
IMM GRANULOCYTES NFR BLD AUTO: 0 % (ref 0–0.5)
LYMPHOCYTES # BLD: 1.6 K/UL (ref 0.8–3.5)
LYMPHOCYTES NFR BLD: 31 % (ref 12–49)
MCH RBC QN AUTO: 31.5 PG (ref 26–34)
MCHC RBC AUTO-ENTMCNC: 31.9 G/DL (ref 30–36.5)
MCV RBC AUTO: 99 FL (ref 80–99)
MONOCYTES # BLD: 0.5 K/UL (ref 0–1)
MONOCYTES NFR BLD: 10 % (ref 5–13)
NEUTS SEG # BLD: 3 K/UL (ref 1.8–8)
NEUTS SEG NFR BLD: 58 % (ref 32–75)
NRBC # BLD: 0 K/UL (ref 0–0.01)
NRBC BLD-RTO: 0 PER 100 WBC
PHOSPHATE SERPL-MCNC: 3 MG/DL (ref 2.6–4.7)
PLATELET # BLD AUTO: 329 K/UL (ref 150–400)
PMV BLD AUTO: 9.9 FL (ref 8.9–12.9)
POTASSIUM SERPL-SCNC: 3.5 MMOL/L (ref 3.5–5.1)
RBC # BLD AUTO: 4.82 M/UL (ref 3.8–5.2)
SARS-COV-2, NAA 2 DAY TAT: NORMAL
SARS-COV-2, NAA: DETECTED
SODIUM SERPL-SCNC: 140 MMOL/L (ref 136–145)
WBC # BLD AUTO: 5.2 K/UL (ref 3.6–11)

## 2022-08-20 ENCOUNTER — TELEPHONE (OUTPATIENT)
Dept: FAMILY MEDICINE CLINIC | Age: 37
End: 2022-08-20

## 2022-08-20 ENCOUNTER — PATIENT MESSAGE (OUTPATIENT)
Dept: FAMILY MEDICINE CLINIC | Age: 37
End: 2022-08-20

## 2022-08-20 DIAGNOSIS — N39.0 URINARY TRACT INFECTION WITHOUT HEMATURIA, SITE UNSPECIFIED: Primary | ICD-10-CM

## 2022-08-20 LAB
BACTERIA SPEC CULT: ABNORMAL
CC UR VC: ABNORMAL
SERVICE CMNT-IMP: ABNORMAL

## 2022-08-20 RX ORDER — NITROFURANTOIN 25; 75 MG/1; MG/1
100 CAPSULE ORAL 2 TIMES DAILY
Qty: 6 CAPSULE | Refills: 0 | Status: SHIPPED | OUTPATIENT
Start: 2022-08-20 | End: 2022-08-23

## 2022-08-22 LAB
EST. AVERAGE GLUCOSE BLD GHB EST-MCNC: 140 MG/DL
HBA1C MFR BLD: 6.5 % (ref 4–5.6)

## 2022-08-22 NOTE — TELEPHONE ENCOUNTER
Called patient to advise of Dr. Adrienne Lopez message:     \"Urine culture positive for ecoli, resistant to cipro, called in macrobid. \"  ----- Message -----  From: Bob, Lab [facility]  Sent: 8/18/2022   4:51 PM EDT  To: Lizbeth Padilla MD    VM left for patient to call the office.

## 2022-08-23 NOTE — PROGRESS NOTES
Your test showed that the SARS-COV2 virus was detected. Follow current CDC guidelines and isolate from others. Patient aware.

## 2022-09-07 ENCOUNTER — OFFICE VISIT (OUTPATIENT)
Dept: FAMILY MEDICINE CLINIC | Age: 37
End: 2022-09-07
Payer: MEDICAID

## 2022-09-07 VITALS
SYSTOLIC BLOOD PRESSURE: 119 MMHG | HEART RATE: 100 BPM | OXYGEN SATURATION: 95 % | HEIGHT: 65 IN | BODY MASS INDEX: 38.65 KG/M2 | RESPIRATION RATE: 16 BRPM | TEMPERATURE: 98.1 F | WEIGHT: 232 LBS | DIASTOLIC BLOOD PRESSURE: 83 MMHG

## 2022-09-07 DIAGNOSIS — E11.9 CONTROLLED TYPE 2 DIABETES MELLITUS WITHOUT COMPLICATION, WITHOUT LONG-TERM CURRENT USE OF INSULIN (HCC): Primary | ICD-10-CM

## 2022-09-07 DIAGNOSIS — Z23 ENCOUNTER FOR IMMUNIZATION: ICD-10-CM

## 2022-09-07 LAB
ALBUMIN UR QL STRIP: 80 MG/L
CREATININE, URINE POC: 300 MG/DL
MICROALBUMIN/CREAT RATIO POC: NORMAL MG/G

## 2022-09-07 PROCEDURE — 82044 UR ALBUMIN SEMIQUANTITATIVE: CPT | Performed by: FAMILY MEDICINE

## 2022-09-07 PROCEDURE — 99214 OFFICE O/P EST MOD 30 MIN: CPT | Performed by: FAMILY MEDICINE

## 2022-09-07 PROCEDURE — 3044F HG A1C LEVEL LT 7.0%: CPT | Performed by: FAMILY MEDICINE

## 2022-09-07 RX ORDER — GABAPENTIN 300 MG/1
300 CAPSULE ORAL 3 TIMES DAILY
COMMUNITY
Start: 2022-09-06

## 2022-09-07 NOTE — PROGRESS NOTES
1. \"Have you been to the ER, urgent care clinic since your last visit? Hospitalized since your last visit? \" No    2. \"Have you seen or consulted any other health care providers outside of the [address] [place] [address] since your last visit? \" No     3. For patients aged 39-70: Has the patient had a colonoscopy / FIT/ Cologuard? NA - based on age      If the patient is female:    4. For patients aged 41-77: Has the patient had a mammogram within the past 2 years? NA - based on age or sex      11. For patients aged 21-65: Has the patient had a pap smear?  Yes - no Care Gap present    Health Maintenance Due   Topic Date Due    Hepatitis C Screening  Never done    Pneumococcal 0-64 years (1 - PCV) Never done    Foot Exam Q1  Never done    MICROALBUMIN Q1  Never done    Eye Exam Retinal or Dilated  Never done    COVID-19 Vaccine (2 - Moderna series) 09/24/2021    Flu Vaccine (1) 09/01/2022

## 2022-09-07 NOTE — PATIENT INSTRUCTIONS
Diabetes Foot Health: Care Instructions  Your Care Instructions     When you have diabetes, your feet need extra care and attention. Diabetes can damage the nerve endings and blood vessels in your feet, making you less likely to notice when your feet are injured. Diabetes also limits your body's ability to fight infection and get blood to areas that need it. If you get a minor foot injury, it could become an ulcer or a serious infection. With good foot care, you can prevent most of these problems. Caring for your feet can be quick and easy. Most of the care can be done when you are bathing or getting ready for bed. Follow-up care is a key part of your treatment and safety. Be sure to make and go to all appointments, and call your doctor if you are having problems. It's also a good idea to know your test results and keep a list of the medicines you take. How can you care for yourself at home? Keep your blood sugar close to normal by watching what and how much you eat, monitoring blood sugar, taking medicines if prescribed, and getting regular exercise. Do not smoke. Smoking affects blood flow and can make foot problems worse. If you need help quitting, talk to your doctor about stop-smoking programs and medicines. These can increase your chances of quitting for good. Eat a diet that is low in fats. High fat intake can cause fat to build up in your blood vessels and decrease blood flow. Inspect your feet daily for blisters, cuts, cracks, or sores. If you cannot see well, use a mirror or have someone help you. Take care of your feet:  Wash your feet every day. Use warm (not hot) water. Check the water temperature with your wrists or other part of your body, not your feet. Dry your feet well. Pat them dry. Do not rub the skin on your feet too hard. Dry well between your toes. If the skin on your feet stays moist, bacteria or a fungus can grow, which can lead to infection. Keep your skin soft.  Use moisturizing skin cream to keep the skin on your feet soft and prevent calluses and cracks. But do not put the cream between your toes, and stop using any cream that causes a rash. Clean underneath your toenails carefully. Do not use a sharp object to clean underneath your toenails. Use the blunt end of a nail file or other rounded tool. Trim and file your toenails straight across to prevent ingrown toenails. Use a nail clipper, not scissors. Use an emery board to smooth the edges. Change socks daily. Socks without seams are best, because seams often rub the feet. You can find socks for people with diabetes from specialty catalogs. Look inside your shoes every day for things like gravel or torn linings, which could cause blisters or sores. Buy shoes that fit well:  Look for shoes that have plenty of space around the toes. This helps prevent bunions and blisters. Try on shoes while wearing the kind of socks you will usually wear with the shoes. Avoid plastic shoes. They may rub your feet and cause blisters. Good shoes should be made of materials that are flexible and breathable, such as leather or cloth. Break in new shoes slowly by wearing them for no more than an hour a day for several days. Take extra time to check your feet for red areas, blisters, or other problems after you wear new shoes. Do not go barefoot. Do not wear sandals, and do not wear shoes with very thin soles. Thin soles are easy to puncture. They also do not protect your feet from hot pavement or cold weather. Have your doctor check your feet during each visit. If you have a foot problem, see your doctor. Do not try to treat an early foot problem at home. Home remedies or treatments that you can buy without a prescription (such as corn removers) can be harmful. Always get early treatment for foot problems. A minor irritation can lead to a major problem if not properly cared for early. When should you call for help?    Call your doctor now or seek immediate medical care if:    You have a foot sore, an ulcer or break in the skin that is not healing after 4 days, bleeding corns or calluses, or an ingrown toenail. You have blue or black areas, which can mean bruising or blood flow problems. You have peeling skin or tiny blisters between your toes or cracking or oozing of the skin. You have a fever for more than 24 hours and a foot sore. You have new numbness or tingling in your feet that does not go away after you move your feet or change positions. You have unexplained or unusual swelling of the foot or ankle. Watch closely for changes in your health, and be sure to contact your doctor if:    You cannot do proper foot care. Where can you learn more? Go to http://www.gray.com/  Enter A739 in the search box to learn more about \"Diabetes Foot Health: Care Instructions. \"  Current as of: July 28, 2021               Content Version: 13.2  © 2006-2022 [employer]. Care instructions adapted under license by [employer] (which disclaims liability or warranty for this information). If you have questions about a medical condition or this instruction, always ask your healthcare professional. [address] any warranty or liability for your use of this information.

## 2022-09-07 NOTE — PROGRESS NOTES
[facility]    History of Present Illness:   Nga Mccann is a 30 y.o. female here for   Chief Complaint   Patient presents with    Hypertension    Leg Pain     Left - 2 weeks          HPI:  Patient here for follow-up hypertension and elevated A1c. She was a gestational diabetic. Lab Results   Component Value Date/Time    Hemoglobin A1c 6.5 (H) 08/18/2022 11:45 AM    Hemoglobin A1c (POC) 6.9 08/01/2022 09:22 AM   Recently had a urinary tract infection treated with Macrobid as well as COVID. She is feeling better in regards to that. She continues to complain of left leg pain, sciatica and numbness down her left foot. She called pain management and is scheduled for an epidural soon.     Health Maintenance  Health Maintenance Due   Topic Date Due    Hepatitis C Screening  Never done    Pneumococcal 0-64 years (1 - PCV) Never done    Eye Exam Retinal or Dilated  Never done    COVID-19 Vaccine (2 - Moderna series) 09/24/2021    Flu Vaccine (1) 09/01/2022       Past Medical, Family, and Social History:     Past Medical History:   Diagnosis Date    Anemia     receiving IV iron last dose in 5/2020    Arthritis     RA    Asthma     inhaler PRN    Epilepsy ([address]     2014    Essential hypertension     Generalized convulsive epilepsy ([facility] [address] 8/1/2022    Gestational diabetes     Heart abnormality     mild heart murmur no medications required    Herpes simplex virus (HSV) infection     Ill-defined condition     fibromyalgia    Infertility, female     Liver disease     Obstructive sleep apnea 8/1/2022    Polycystic disease, ovaries     Psychiatric problem     depression      Past Surgical History:   Procedure Laterality Date    HX OTHER SURGICAL      appendix, gallbladder, tonsils & wisdom teeth    [place] GASTRIC BYPASS,OBESE<150CM WENDY-EN-Y      pyloraplasty        Current Outpatient Medications on File Prior to Visit   Medication Sig Dispense Refill    gabapentin (NEURONTIN) 300 mg capsule Take 300 mg by mouth three (3) times daily. varenicline (CHANTIX) 1 mg tablet Take 1 Tablet by mouth two (2) times daily (with meals). 60 Tablet 1    ondansetron (ZOFRAN ODT) 4 mg disintegrating tablet Take 1 Tablet by mouth every eight (8) hours as needed for Nausea or Vomiting. 20 Tablet 0    methocarbamoL (ROBAXIN) 500 mg tablet Take 1 Tablet by mouth three (3) times daily. 30 Tablet 0    DULoxetine (CYMBALTA) 30 mg capsule Take 1 Capsule by mouth in the morning. 30 Capsule 1    amLODIPine (NORVASC) 5 mg tablet Take 1 Tablet by mouth in the morning. 30 Tablet 1    busPIRone (BUSPAR) 10 mg tablet Take 10 mg by mouth in the morning. nystatin (MYCOSTATIN) 100,000 unit/mL suspension As needed      famotidine (PEPCID) 20 mg tablet Take 20 mg by mouth two (2) times a day. No current facility-administered medications on file prior to visit. Patient Active Problem List   Diagnosis Code    Depression F32. A    Steatosis of liver K76.0    Presence of intrauterine contraceptive device Z97.5    Posttraumatic stress disorder F43.10    Migraine without aura G43.009    Low back pain M54.50    Hiatal hernia K44.9    Fibromyalgia M79.7    Asthma J45.909    Anxiety F41.9    Cannabis abuse F12.10    Benign hypertension I10       Social History     Socioeconomic History    Marital status: SINGLE    Highest education level: High school graduate   Occupational History    Occupation: PCA   Tobacco Use    Smoking status: Every Day     Packs/day: 0.50     Types: Cigarettes    Smokeless tobacco: Never   Vaping Use    Vaping Use: Never used   Substance and Sexual Activity    Alcohol use: Never     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 Respiratory:  Negative for cough and shortness of breath. Musculoskeletal:  Positive for back pain. Neurological:  Positive for tingling.      Objective:   Visit Vitals  /83 (BP 1 Location: Right arm, BP Patient Position: Sitting, BP Cuff Size: Large adult)   Pulse 100   Temp 98.1 °F (36.7 °C) (Oral)   Resp 16   Ht 5' 5\" (1.651 m)   Wt 232 lb (105.2 kg)   SpO2 95%   BMI 38.61 kg/m²        Physical Exam  PHYSICAL EXAM:  Gen: Pt sitting in chair, in NAD  Head: Normocephalic, atraumatic  Eyes: Sclera anicteric, EOM grossly intact,  CVS: Normal S1, S2, no m/r/g  Resp: CTAB, no wheezes or rales  Abd: Soft, non-tender, non-distended, +BS  Extrem: Atraumatic, no cyanosis or edema  Pulses: 2+   Skin: Warm, dry  Neuro: Alert, oriented, appropriate  Diabetic foot exam:        Left Foot:   Visual Exam: normal    Pulse DP: 2+ (normal)   Filament test: absent sensation          Right Foot:   Visual Exam: normal    Pulse DP: 2+ (normal)   Filament test: normal sensation       Pertinent Labs/Studies:  3 most recent PHQ Screens 8/17/2022   Little interest or pleasure in doing things Not at all   Feeling down, depressed, irritable, or hopeless Not at all   Total Score PHQ 2 0   Trouble falling or staying asleep, or sleeping too much -   Feeling tired or having little energy -   Poor appetite, weight loss, or overeating -   Feeling bad about yourself - or that you are a failure or have let yourself or your family down -   Trouble concentrating on things such as school, work, reading, or watching TV -   Moving or speaking so slowly that other people could have noticed; or the opposite being so fidgety that others notice -   Thoughts of being better off dead, or hurting yourself in some way -      Testing preformed onsite at today's visit:  Results for orders placed or performed in visit on 09/07/22   AMB POC URINE, MICROALBUMIN, SEMIQUANT (3 RESULTS)   Result Value Ref Range    ALBUMIN, URINE POC 80 Negative mg/L    CREATININE, URINE  mg/dL    Microalbumin/creat ratio (POC)  <30 MG/G         Assessment and orders:       ICD-10-CM ICD-9-CM    1. Controlled type 2 diabetes mellitus without complication, without long-term current use of insulin ([facility])  E11.9 250.00 REFERRAL TO OPTOMETRY      AMB POC URINE, MICROALBUMIN, SEMIQUANT (3 RESULTS)      liraglutide (VICTOZA) 0.6 mg/0.1 mL (18 mg/3 mL) pnij      2. Encounter for immunization  Z23 V03.89 pneumococcal 20-kat conj-dip, PF, (PREVNAR 20) 0.5 mL syrg injection        Diagnoses and all orders for this visit:    1. Controlled type 2 diabetes mellitus without complication, without long-term current use of insulin ([facility])  -     REFERRAL TO OPTOMETRY  -     AMB POC URINE, MICROALBUMIN, SEMIQUANT (3 RESULTS)  -     liraglutide (VICTOZA) 0.6 mg/0.1 mL (18 mg/3 mL) pnij; 0.6 mg by SubCUTAneous route daily. 2. Encounter for immunization  -     pneumococcal 20-kat conj-dip, PF, (PREVNAR 20) 0.5 mL syrg injection; 0.5 mL by IntraMUSCular route once for 1 dose. Advised COVID booster in 2 months from infection and flu shot at local pharmacy. Follow-up and Dispositions    Return in about 3 months (around 12/7/2022). the following changes in treatment are made: start victoza  GLP-1 agonists are contraindicated in patients with a personal or family history of MTC or in patients with Multiple Endocrine Neoplasia syndrome type 2 (MEN 2). Counseled patient regarding the potential risk for MTC with the use of semaglutide and informed them of symptoms of thyroid tumors (eg, a mass in the neck, dysphagia, dyspnea, persistent hoarseness). reviewed diet, exercise and weight control  reviewed medications and side effects in detail  Spent 32 min with patient, reviewing chart and face to face exam, clinical documentation.     I have discussed the diagnosis with the patient and the intended plan as seen in the above orders. Social history, medical history, and labs were reviewed. The patient has received an after-visit summary and questions were answered concerning future plans. I have discussed medication side effects and warnings with the patient as well. Patient/guardian verbalized understanding and accepts plan & risks. Please note that this dictation was completed with [employer], the computer voice recognition software. Quite often unanticipated grammatical, syntax, homophones, and other interpretive errors are inadvertently transcribed by the computer software. Please disregard these errors. Please excuse any errors that have escaped final proofreading. Thank you.      MD MARION Judd & ROBERT GOYAL [facility]  09/07/22

## 2022-09-27 ENCOUNTER — OFFICE VISIT (OUTPATIENT)
Dept: FAMILY MEDICINE CLINIC | Age: 37
End: 2022-09-27
Payer: MEDICAID

## 2022-09-27 VITALS
BODY MASS INDEX: 38.69 KG/M2 | OXYGEN SATURATION: 96 % | HEART RATE: 108 BPM | DIASTOLIC BLOOD PRESSURE: 85 MMHG | SYSTOLIC BLOOD PRESSURE: 123 MMHG | HEIGHT: 65 IN | TEMPERATURE: 97.8 F | RESPIRATION RATE: 22 BRPM | WEIGHT: 232.2 LBS

## 2022-09-27 DIAGNOSIS — N20.0 NEPHROLITHIASIS: Primary | ICD-10-CM

## 2022-09-27 DIAGNOSIS — R35.0 URINARY FREQUENCY: ICD-10-CM

## 2022-09-27 DIAGNOSIS — Z23 ENCOUNTER FOR IMMUNIZATION: ICD-10-CM

## 2022-09-27 LAB
BILIRUB UR QL STRIP: NEGATIVE
GLUCOSE UR-MCNC: NEGATIVE MG/DL
KETONES P FAST UR STRIP-MCNC: NEGATIVE MG/DL
PH UR STRIP: 7 [PH] (ref 4.6–8)
PROT UR QL STRIP: NEGATIVE
SP GR UR STRIP: 1.02 (ref 1–1.03)
UA UROBILINOGEN AMB POC: NORMAL (ref 0.2–1)
URINALYSIS CLARITY POC: CLEAR
URINALYSIS COLOR POC: YELLOW
URINE BLOOD POC: NEGATIVE
URINE LEUKOCYTES POC: NEGATIVE
URINE NITRITES POC: NEGATIVE

## 2022-09-27 PROCEDURE — 90686 IIV4 VACC NO PRSV 0.5 ML IM: CPT | Performed by: FAMILY MEDICINE

## 2022-09-27 PROCEDURE — 81003 URINALYSIS AUTO W/O SCOPE: CPT | Performed by: FAMILY MEDICINE

## 2022-09-27 PROCEDURE — 99213 OFFICE O/P EST LOW 20 MIN: CPT | Performed by: FAMILY MEDICINE

## 2022-09-27 RX ORDER — TAMSULOSIN HYDROCHLORIDE 0.4 MG/1
0.4 CAPSULE ORAL DAILY
Qty: 30 CAPSULE | Refills: 0 | Status: SHIPPED | OUTPATIENT
Start: 2022-09-27

## 2022-09-27 NOTE — PROGRESS NOTES
[facility]    History of Present Illness:   Bautista Mendoza is a 30 y.o. female here for   Chief Complaint   Patient presents with    Other     Passed another kidney stone on Sunday. Urinary Frequency         HPI:  Patient here after recently passing kidney stone. She had an epidural and then the next day started with worsening pain on her left side of the back coming around to the belly. By Sunday night she had passed a stone. This is her second stone in about a month and a half. She was admitted last month due to urinary tract infection as well-- treated with Macrobid. She has been taking Flomax but is running out. Not seen urology since discharge. Recently started Victoza and tolerating well.     Health Maintenance  Health Maintenance Due   Topic Date Due    Hepatitis C Screening  Never done    Pneumococcal 0-64 years (1 - PCV) Never done    Eye Exam Retinal or Dilated  Never done    COVID-19 Vaccine (2 - Moderna series) 09/24/2021       Past Medical, Family, and Social History:     Past Medical History:   Diagnosis Date    Anemia     receiving IV iron last dose in 5/2020    Arthritis     RA    Asthma     inhaler PRN    Epilepsy ([facility] [address]     2014    Essential hypertension     Gastroparesis     Generalized convulsive epilepsy ([facility] [address] 08/01/2022    Gestational diabetes     Heart abnormality     mild heart murmur no medications required    Herpes simplex virus (HSV) infection     Ill-defined condition     fibromyalgia    Infertility, female     Liver disease     fatty liver    Low back pain     Obstructive sleep apnea 08/01/2022    Polycystic disease, ovaries     Psychiatric problem     depression, PTSD      Past Surgical History:   Procedure Laterality Date    HX OTHER SURGICAL      appendix, gallbladder, tonsils & wisdom teeth, Nissen fundoplication    HX TONSILLECTOMY      [place] GASTRIC BYPASS,OBESE<150CM WENDY-EN-Y      pyloraplasty        Current Outpatient Medications on File Prior to Visit   Medication Sig Dispense Refill    amLODIPine (NORVASC) 5 mg tablet TAKE ONE TABLET BY MOUTH EVERY MORNING 30 Tablet 5    gabapentin (NEURONTIN) 300 mg capsule Take 300 mg by mouth three (3) times daily. liraglutide (VICTOZA) 0.6 mg/0.1 mL (18 mg/3 mL) pnij 0.6 mg by SubCUTAneous route daily. 3 mL 5    varenicline (CHANTIX) 1 mg tablet Take 1 Tablet by mouth two (2) times daily (with meals). 60 Tablet 1    ondansetron (ZOFRAN ODT) 4 mg disintegrating tablet Take 1 Tablet by mouth every eight (8) hours as needed for Nausea or Vomiting. 20 Tablet 0    methocarbamoL (ROBAXIN) 500 mg tablet Take 1 Tablet by mouth three (3) times daily. 30 Tablet 0    DULoxetine (CYMBALTA) 30 mg capsule Take 1 Capsule by mouth in the morning. 30 Capsule 1    busPIRone (BUSPAR) 10 mg tablet Take 10 mg by mouth in the morning. nystatin (MYCOSTATIN) 100,000 unit/mL suspension As needed      famotidine (PEPCID) 20 mg tablet Take 20 mg by mouth two (2) times a day. No current facility-administered medications on file prior to visit. Patient Active Problem List   Diagnosis Code    Depression F32. A    Steatosis of liver K76.0    Presence of intrauterine contraceptive device Z97.5    Posttraumatic stress disorder F43.10    Migraine without aura G43.009    Low back pain M54.50    Hiatal hernia K44.9    Fibromyalgia M79.7    Asthma J45.909    Anxiety F41.9    Cannabis abuse F12.10    Benign hypertension I10       Social History     Socioeconomic History    Marital status: SINGLE    Highest education level: High school graduate   Occupational History    Occupation: PCA   Tobacco Use    Smoking status: Every Day     Packs/day: 0.50     Types: Cigarettes    Smokeless tobacco: Never   Vaping Use    Vaping Use: Never used   Substance and Sexual Activity    Alcohol use: Never     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 Respiratory:  Negative for cough and shortness of breath. Gastrointestinal:  Positive for abdominal pain. Objective:   Visit Vitals  /85 (BP 1 Location: Right upper arm, BP Patient Position: Sitting, BP Cuff Size: Large adult)   Pulse (!) 108   Temp 97.8 °F (36.6 °C) (Oral)   Resp 22   Ht 5' 5\" (1.651 m)   Wt 232 lb 3.2 oz (105.3 kg)   SpO2 96%   BMI 38.64 kg/m²        Physical Exam  PHYSICAL EXAM:  Gen: Pt sitting in chair, in NAD  Head: Normocephalic, atraumatic  Eyes: Sclera anicteric, EOM grossly intact,  CVS: Normal S1, S2, no m/r/g  Resp: CTAB, no wheezes or rales  Abd: Soft, non-tender, non-distended, +BS, no CVAT  Neuro: Alert, oriented, appropriate    Pertinent Labs/Studies:  3 most recent PHQ Screens 8/17/2022   Little interest or pleasure in doing things Not at all   Feeling down, depressed, irritable, or hopeless Not at all   Total Score PHQ 2 0   Trouble falling or staying asleep, or sleeping too much -   Feeling tired or having little energy -   Poor appetite, weight loss, or overeating -   Feeling bad about yourself - or that you are a failure or have let yourself or your family down -   Trouble concentrating on things such as school, work, reading, or watching TV -   Moving or speaking so slowly that other people could have noticed; or the opposite being so fidgety that others notice -   Thoughts of being better off dead, or hurting yourself in some way -      No flowsheet data found.    Testing preformed onsite at today's visit:  Results for orders placed or performed in visit on 09/27/22   AMB POC URINALYSIS DIP STICK AUTO W/O MICRO   Result Value Ref Range    Color (UA POC) Yellow     Clarity (UA POC) Clear     Glucose (UA POC) Negative Negative    Bilirubin (UA POC) Negative Negative    Ketones (UA POC) Negative Negative    Specific gravity (UA POC) 1.025 1.001 - 1.035    Blood (UA POC) Negative Negative    pH (UA POC) 7.0 4.6 - 8.0    Protein (UA POC) Negative Negative    Urobilinogen (UA POC) 0.2 mg/dL 0.2 - 1    Nitrites (UA POC) Negative Negative    Leukocyte esterase (UA POC) Negative Negative         Assessment and orders:       ICD-10-CM ICD-9-CM    1. Nephrolithiasis  N20.0 592.0 REFERRAL TO UROLOGY      STONE ANALYSIS, URINARY      tamsulosin (FLOMAX) 0.4 mg capsule      2. Encounter for immunization  Z23 V03.89 INFLUENZA, FLUARIX, FLULAVAL, FLUZONE (AGE 6 MO+), AFLURIA(AGE 3Y+) IM, PF, 0.5 ML      3. Urinary frequency  R35.0 788.41 AMB POC URINALYSIS DIP STICK AUTO W/O MICRO      REFERRAL TO UROLOGY        Diagnoses and all orders for this visit:    1. Nephrolithiasis  -     REFERRAL TO UROLOGY  -     STONE ANALYSIS, URINARY; Future  -     tamsulosin (FLOMAX) 0.4 mg capsule; Take 1 Capsule by mouth daily. 2. Encounter for immunization  -     INFLUENZA, FLUARIX, FLULAVAL, FLUZONE (AGE 6 MO+), AFLURIA(AGE 3Y+) IM, PF, 0.5 ML    3. Urinary frequency  -     AMB POC URINALYSIS DIP STICK AUTO W/O MICRO  -     REFERRAL TO UROLOGY    Follow-up and Dispositions    Return if symptoms worsen or fail to improve.       lab results and schedule of future lab studies reviewed with patient  reviewed medications and side effects in detail  Advised to increase water intake. Referral to urology. Patient will bring back stone in the morning for testing. Eat less salt and salty foods. One way to do this is to avoid processed foods and limit how often you eat at restaurants. I have discussed the diagnosis with the patient and the intended plan as seen in the above orders. Social history, medical history, and labs were reviewed.   The patient has received an after-visit summary and questions were answered concerning future plans. I have discussed medication side effects and warnings with the patient as well. Patient/guardian verbalized understanding and accepts plan & risks. Please note that this dictation was completed with [employer], the computer voice recognition software. Quite often unanticipated grammatical, syntax, homophones, and other interpretive errors are inadvertently transcribed by the computer software. Please disregard these errors. Please excuse any errors that have escaped final proofreading. Thank you.      MD MARION Pineda & ROBERT GOYAL [facility]  09/27/22

## 2022-10-03 ENCOUNTER — TELEPHONE (OUTPATIENT)
Dept: FAMILY MEDICINE CLINIC | Age: 37
End: 2022-10-03

## 2022-10-03 DIAGNOSIS — F17.200 NICOTINE DEPENDENCE WITH CURRENT USE: ICD-10-CM

## 2022-10-03 RX ORDER — PEN NEEDLE, DIABETIC 29 G X1/2"
NEEDLE, DISPOSABLE MISCELLANEOUS
Qty: 90 PEN NEEDLE | Refills: 1 | Status: SHIPPED | OUTPATIENT
Start: 2022-10-03

## 2022-10-03 RX ORDER — VARENICLINE TARTRATE 1 MG/1
TABLET, FILM COATED ORAL
Qty: 56 TABLET | Refills: 0 | Status: SHIPPED | OUTPATIENT
Start: 2022-10-03

## 2022-10-03 NOTE — TELEPHONE ENCOUNTER
VM left in regards to lab results. Lab results are still in process. Pt calling on lab results kidney stones? Please call [phone].

## 2022-10-03 NOTE — TELEPHONE ENCOUNTER
PT needs an Rx for the needles used with the Victoza Rx. [facility]

## 2022-10-05 LAB
CALCIUM OXALATE DIHYDRATE MFR STONE IR: 80 %
COLOR STONE: NORMAL
COM MFR STONE: 20 %
COMMENT, 519994: NORMAL
COMPOSITION, 120440: NORMAL
DISCLAIMER, STO32L: NORMAL
PHOTO, 120675: NORMAL
PLEASE NOTE, 130422: NORMAL
SIZE STONE: NORMAL MM
SPECIMEN SOURCE: NORMAL
WT STONE: 12 MG

## 2022-10-06 NOTE — TELEPHONE ENCOUNTER
Patient advised of letter with results. Went over results with patient. Patient verbalized understanding of foods in which to avoid. Pt would like a call back on her labs.

## 2022-11-15 DIAGNOSIS — R11.2 NAUSEA AND VOMITING, UNSPECIFIED VOMITING TYPE: ICD-10-CM

## 2022-11-16 RX ORDER — ONDANSETRON 4 MG/1
TABLET, ORALLY DISINTEGRATING ORAL
Qty: 20 TABLET | Refills: 0 | Status: SHIPPED | OUTPATIENT
Start: 2022-11-16

## 2022-12-02 DIAGNOSIS — M54.41 CHRONIC BILATERAL LOW BACK PAIN WITH BILATERAL SCIATICA: ICD-10-CM

## 2022-12-02 DIAGNOSIS — G89.29 CHRONIC BILATERAL LOW BACK PAIN WITH BILATERAL SCIATICA: ICD-10-CM

## 2022-12-02 DIAGNOSIS — M54.42 CHRONIC BILATERAL LOW BACK PAIN WITH BILATERAL SCIATICA: ICD-10-CM

## 2022-12-03 RX ORDER — DULOXETIN HYDROCHLORIDE 30 MG/1
CAPSULE, DELAYED RELEASE ORAL
Qty: 30 CAPSULE | Refills: 0 | Status: SHIPPED | OUTPATIENT
Start: 2022-12-03

## 2022-12-07 ENCOUNTER — OFFICE VISIT (OUTPATIENT)
Dept: FAMILY MEDICINE CLINIC | Age: 37
End: 2022-12-07
Payer: MEDICAID

## 2022-12-07 VITALS
TEMPERATURE: 97.7 F | OXYGEN SATURATION: 96 % | BODY MASS INDEX: 39.32 KG/M2 | DIASTOLIC BLOOD PRESSURE: 87 MMHG | HEIGHT: 65 IN | RESPIRATION RATE: 20 BRPM | HEART RATE: 88 BPM | WEIGHT: 236 LBS | SYSTOLIC BLOOD PRESSURE: 125 MMHG

## 2022-12-07 DIAGNOSIS — Z11.59 NEED FOR HEPATITIS C SCREENING TEST: ICD-10-CM

## 2022-12-07 DIAGNOSIS — R73.9 ELEVATED BLOOD SUGAR: ICD-10-CM

## 2022-12-07 DIAGNOSIS — K31.84 GASTROPARESIS: ICD-10-CM

## 2022-12-07 DIAGNOSIS — E11.9 CONTROLLED TYPE 2 DIABETES MELLITUS WITHOUT COMPLICATION, WITHOUT LONG-TERM CURRENT USE OF INSULIN (HCC): ICD-10-CM

## 2022-12-07 DIAGNOSIS — R10.30 LOWER ABDOMINAL PAIN: Primary | ICD-10-CM

## 2022-12-07 DIAGNOSIS — R11.2 NAUSEA AND VOMITING, UNSPECIFIED VOMITING TYPE: ICD-10-CM

## 2022-12-07 DIAGNOSIS — D72.829 LEUKOCYTOSIS, UNSPECIFIED TYPE: ICD-10-CM

## 2022-12-07 LAB
BILIRUB UR QL STRIP: NORMAL
GLUCOSE UR-MCNC: NEGATIVE MG/DL
HBA1C MFR BLD HPLC: 8.6 %
KETONES P FAST UR STRIP-MCNC: NORMAL MG/DL
PH UR STRIP: 6.5 [PH] (ref 4.6–8)
PROT UR QL STRIP: NORMAL
SP GR UR STRIP: 1.02 (ref 1–1.03)
UA UROBILINOGEN AMB POC: NORMAL (ref 0.2–1)
URINALYSIS CLARITY POC: NORMAL
URINALYSIS COLOR POC: YELLOW
URINE BLOOD POC: NORMAL
URINE LEUKOCYTES POC: NEGATIVE
URINE NITRITES POC: NEGATIVE

## 2022-12-07 PROCEDURE — 3052F HG A1C>EQUAL 8.0%<EQUAL 9.0%: CPT | Performed by: FAMILY MEDICINE

## 2022-12-07 PROCEDURE — 99214 OFFICE O/P EST MOD 30 MIN: CPT | Performed by: FAMILY MEDICINE

## 2022-12-07 PROCEDURE — 81003 URINALYSIS AUTO W/O SCOPE: CPT | Performed by: FAMILY MEDICINE

## 2022-12-07 PROCEDURE — 3074F SYST BP LT 130 MM HG: CPT | Performed by: FAMILY MEDICINE

## 2022-12-07 PROCEDURE — 3078F DIAST BP <80 MM HG: CPT | Performed by: FAMILY MEDICINE

## 2022-12-07 PROCEDURE — 83036 HEMOGLOBIN GLYCOSYLATED A1C: CPT | Performed by: FAMILY MEDICINE

## 2022-12-07 RX ORDER — ONDANSETRON 4 MG/1
4 TABLET, ORALLY DISINTEGRATING ORAL
Qty: 20 TABLET | Refills: 1 | Status: SHIPPED | OUTPATIENT
Start: 2022-12-07

## 2022-12-07 RX ORDER — ALBUTEROL SULFATE 90 UG/1
AEROSOL, METERED RESPIRATORY (INHALATION)
COMMUNITY

## 2022-12-07 RX ORDER — METFORMIN HYDROCHLORIDE 500 MG/1
500 TABLET ORAL 2 TIMES DAILY WITH MEALS
Qty: 60 TABLET | Refills: 2 | Status: CANCELLED | OUTPATIENT
Start: 2022-12-07

## 2022-12-07 RX ORDER — METFORMIN HYDROCHLORIDE 500 MG/1
500 TABLET, EXTENDED RELEASE ORAL
Qty: 30 TABLET | Refills: 1 | Status: SHIPPED | OUTPATIENT
Start: 2022-12-07

## 2022-12-07 RX ORDER — INSULIN PUMP SYRINGE, 3 ML
EACH MISCELLANEOUS
Qty: 1 KIT | Refills: 0 | Status: SHIPPED | OUTPATIENT
Start: 2022-12-07

## 2022-12-07 RX ORDER — CAPSAICIN 0.03 G/100G
CREAM TOPICAL
COMMUNITY
Start: 2022-11-28

## 2022-12-07 NOTE — PROGRESS NOTES
[facility]    History of Present Illness:   Kenna Rojas is a 30 y.o. female here for   Chief Complaint   Patient presents with    Follow Up Chronic Condition         HPI:  Two weeks ago went to ER. Had N/V before thanksgiving. C/o Left sided abd pain. [employer]. Pain is at incision site of surgery for gastroparesis. Symptoms worse since starting victoza a few months ago for elevated a1c. Needs refill on zofran. Lab Results   Component Value Date/Time    Hemoglobin A1c 6.5 (H) 08/18/2022 11:45 AM    Hemoglobin A1c (POC) 8.6 12/07/2022 09:53 AM      WBC 15, elevated leuk. Also having loose bms, no BRBPR or melena. On menses--LMP 11/9 - 11/16, spotting since then. Has IUD in place, followed by [facility].      Health Maintenance  Health Maintenance Due   Topic Date Due    Hepatitis C Screening  Never done    Pneumococcal 0-64 years (1 - PCV) Never done    Hepatitis B Vaccine (1 of 3 - Risk 3-dose series) Never done    COVID-19 Vaccine (2 - Moderna series) 09/24/2021       Past Medical, Family, and Social History:     Past Medical History:   Diagnosis Date    Anemia     receiving IV iron last dose in 5/2020    Arthritis     RA    Asthma     inhaler PRN    Epilepsy ([address]     2014    Essential hypertension     Gastroparesis     Generalized convulsive epilepsy [address] [address] 08/01/2022    Gestational diabetes     Heart abnormality     mild heart murmur no medications required    Herpes simplex virus (HSV) infection     Ill-defined condition     fibromyalgia    Infertility, female     Liver disease     fatty liver    Low back pain     Obstructive sleep apnea 08/01/2022    Polycystic disease, ovaries     Psychiatric problem     depression, PTSD      Past Surgical History:   Procedure Laterality Date    HX OTHER SURGICAL      appendix, gallbladder, tonsils & wisdom teeth, Nissen fundoplication    HX TONSILLECTOMY      WI GASTRIC BYPASS,OBESE<150CM WENDY-EN-Y      pyloraplasty        Current Outpatient Medications on File Prior to Visit   Medication Sig Dispense Refill    albuterol (PROVENTIL HFA, VENTOLIN HFA, PROAIR HFA) 90 mcg/actuation inhaler ProAir HFA 90 mcg/actuation aerosol inhaler      capsicum oleoresin 0.025 % topical cream       DULoxetine (CYMBALTA) 30 mg capsule TAKE ONE CAPSULE BY MOUTH EVERY MORNING 30 Capsule 0    Insulin Needles, Disposable, (Pen Needles) 31 gauge x 1/4\" ndle Use daily with victoza 90 Pen Needle 1    amLODIPine (NORVASC) 5 mg tablet TAKE ONE TABLET BY MOUTH EVERY MORNING 30 Tablet 5    gabapentin (NEURONTIN) 300 mg capsule Take 300 mg by mouth three (3) times daily. busPIRone (BUSPAR) 10 mg tablet Take 10 mg by mouth in the morning. nystatin (MYCOSTATIN) 100,000 unit/mL suspension As needed      famotidine (PEPCID) 20 mg tablet Take 20 mg by mouth two (2) times a day. varenicline (CHANTIX) 1 mg tablet TAKE 1 TABLET BY MOUTH 2 TIMES DAILY WITH MEALS (Patient not taking: Reported on 2022) 56 Tablet 0    tamsulosin (FLOMAX) 0.4 mg capsule Take 1 Capsule by mouth daily. (Patient not taking: Reported on 2022) 30 Capsule 0     No current facility-administered medications on file prior to visit. Patient Active Problem List   Diagnosis Code    Depression F32. A    Steatosis of liver K76.0    Presence of intrauterine contraceptive device Z97.5    Posttraumatic stress disorder F43.10    Migraine without aura G43.009    Low back pain M54.50    Hiatal hernia K44.9    Fibromyalgia M79.7    Asthma J45.909    Anxiety F41.9    Cannabis abuse F12.10    Benign hypertension I10    Gastroparesis K31.84       Social History     Socioeconomic History    Marital status: SINGLE    Highest education level: High school graduate   Occupational History    Occupation: PCA   Tobacco Use    Smoking status: Former     Packs/day: 0.50     Types: Cigarettes     Quit date: 2022     Years since quittin.1    Smokeless tobacco: Never   Vaping Use    Vaping Use: Never used   Substance and Sexual Activity    Alcohol use: Never     Comment: in recovery since age 26    Drug use: Yes     Frequency: 7.0 times per week     Types: Marijuana     Comment: medically purposes due to PTSD     Social Determinants of Health     Financial Resource Strain: High Risk    Difficulty of Paying Living Expenses: Very hard   Food Insecurity: No Food Insecurity    Worried About Running Out of Food in the Last Year: Never true    Ran Out of Food in the Last Year: Never true        Review of Systems   Review of Systems   Constitutional:  Negative for chills and fever. Respiratory:  Negative for cough and shortness of breath. Gastrointestinal:  Positive for abdominal pain, diarrhea, nausea and vomiting.      Objective:   Visit Vitals  /87 (BP 1 Location: Left upper arm, BP Patient Position: Sitting, BP Cuff Size: Adult)   Pulse 88   Temp 97.7 °F (36.5 °C) (Oral)   Resp 20   Ht 5' 5\" (1.651 m)   Wt 236 lb (107 kg)   SpO2 96%   BMI 39.27 kg/m²        Physical Exam  PHYSICAL EXAM:  Gen: Pt sitting in chair, in NAD  Head: Normocephalic, atraumatic  Eyes: Sclera anicteric, EOM grossly intact,  Ears: TM's pearly with good light reflex b/l  CVS: Normal S1, S2, no m/r/g  Resp: CTAB, no wheezes or rales  Abd: Soft, epigastric and suprapubic tenderness, non-distended, hypoactive BS  Neuro: Alert, oriented, appropriate    Pertinent Labs/Studies:  3 most recent PHQ Screens 12/7/2022   Little interest or pleasure in doing things Several days   Feeling down, depressed, irritable, or hopeless Nearly every day   Total Score PHQ 2 4   Trouble falling or staying asleep, or sleeping too much Nearly every day   Feeling tired or having little energy Nearly every day   Poor appetite, weight loss, or overeating Nearly every day   Feeling bad about yourself - or that you are a failure or have let yourself or your family down Nearly every day   Trouble concentrating on things such as school, work, reading, or watching TV Not at all   Moving or speaking so slowly that other people could have noticed; or the opposite being so fidgety that others notice Not at all   Thoughts of being better off dead, or hurting yourself in some way Not at all   PHQ 9 Score 16   How difficult have these problems made it for you to do your work, take care of your home and get along with others Somewhat difficult      No flowsheet data found. Testing preformed onsite at today's visit:  Results for orders placed or performed in visit on 12/07/22   AMB POC URINALYSIS DIP STICK AUTO W/O MICRO   Result Value Ref Range    Color (UA POC) Yellow     Clarity (UA POC) Cloudy     Glucose (UA POC) Negative Negative    Bilirubin (UA POC) 1+ Negative    Ketones (UA POC) Trace Negative    Specific gravity (UA POC) 1.025 1.001 - 1.035    Blood (UA POC) 2+ Negative    pH (UA POC) 6.5 4.6 - 8.0    Protein (UA POC) 1+ Negative    Urobilinogen (UA POC) 0.2 mg/dL 0.2 - 1    Nitrites (UA POC) Negative Negative    Leukocyte esterase (UA POC) Negative Negative   AMB POC HEMOGLOBIN A1C   Result Value Ref Range    Hemoglobin A1c (POC) 8.6 %         Assessment and orders:       ICD-10-CM ICD-9-CM    1. Lower abdominal pain  R10.30 789.09 AMB POC URINALYSIS DIP STICK AUTO W/O MICRO      CULTURE, URINE      REFERRAL TO GASTROENTEROLOGY      CULTURE, URINE      2. Nausea and vomiting, unspecified vomiting type  R11.2 787.01 ondansetron (ZOFRAN ODT) 4 mg disintegrating tablet      REFERRAL TO GASTROENTEROLOGY      3. Gastroparesis  K31.84 536.3 REFERRAL TO GASTROENTEROLOGY      4. Leukocytosis, unspecified type  D72.829 288.60 PERIPHERAL SMEAR      5. Elevated blood sugar  R73.9 790.29 AMB POC HEMOGLOBIN A1C      6.  Controlled type 2 diabetes mellitus without complication, without long-term current use of insulin (HCC)  E11.9 250.00 MICROALBUMIN, UR, RAND W/ MICROALB/CREAT RATIO      metFORMIN ER (GLUCOPHAGE XR) 500 mg tablet      Blood-Glucose Meter monitoring kit      glucose blood VI test strips strip      MICROALBUMIN, UR, RAND W/ MICROALB/CREAT RATIO      7. Need for hepatitis C screening test  Z11.59 V73.89 HEPATITIS C AB        Diagnoses and all orders for this visit:    1. Lower abdominal pain  -     AMB POC URINALYSIS DIP STICK AUTO W/O MICRO  -     CULTURE, URINE; Future  -     REFERRAL TO GASTROENTEROLOGY    2. Nausea and vomiting, unspecified vomiting type  -     ondansetron (ZOFRAN ODT) 4 mg disintegrating tablet; Take 1 Tablet by mouth every eight (8) hours as needed for Nausea or Vomiting.  -     REFERRAL TO GASTROENTEROLOGY    3. Gastroparesis  -     REFERRAL TO GASTROENTEROLOGY    4. Leukocytosis, unspecified type  -     PERIPHERAL SMEAR; Future    5. Elevated blood sugar  -     AMB POC HEMOGLOBIN A1C    6. Controlled type 2 diabetes mellitus without complication, without long-term current use of insulin (HCC)  -     MICROALBUMIN, UR, RAND W/ MICROALB/CREAT RATIO; Future  -     metFORMIN ER (GLUCOPHAGE XR) 500 mg tablet; Take 1 Tablet by mouth daily (with dinner). -     Blood-Glucose Meter monitoring kit; Check fsbs daily dx E11.9  -     glucose blood VI test strips strip; Check fsbs daily    7. Need for hepatitis C screening test  -     HEPATITIS C AB; Future    Follow-up and Dispositions    Return in about 5 weeks (around 1/12/2023) for diabetes. the following changes in treatment are made: stop victoza as may be worsening n/v due to delayed gastric emptying. GI eval.  Trial of metformin XR for diabetes. Monitor fsbs daily. Limit carbohydrates such as carbonated beverages, sweet tea, and sweets. lab results and schedule of future lab studies reviewed with patient  reviewed medications and side effects in detail    I have discussed the diagnosis with the patient and the intended plan as seen in the above orders. Social history, medical history, and labs were reviewed.   The patient has received an after-visit summary and questions were answered concerning future plans. I have discussed medication side effects and warnings with the patient as well. Patient/guardian verbalized understanding and accepts plan & risks. Please note that this dictation was completed with [employer], the computer voice recognition software. Quite often unanticipated grammatical, syntax, homophones, and other interpretive errors are inadvertently transcribed by the computer software. Please disregard these errors. Please excuse any errors that have escaped final proofreading. Thank you.      MD MARION Magana [facility]  12/08/22

## 2022-12-07 NOTE — PROGRESS NOTES
1. \"Have you been to the ER, urgent care clinic since your last visit? Hospitalized since your last visit? \" Yes- twice since thanksgiving to [facility]     2. \"Have you seen or consulted any other health care providers outside of the [address] [place] [address] since your last visit? \" No     3. For patients aged 39-70: Has the patient had a colonoscopy / FIT/ Cologuard? NA - based on age      If the patient is female:    4. For patients aged 41-77: Has the patient had a mammogram within the past 2 years? NA - based on age or sex      11. For patients aged 21-65: Has the patient had a pap smear?  Yes - no Care Gap present    Health Maintenance Due   Topic Date Due    Hepatitis C Screening  Never done    Pneumococcal 0-64 years (1 - PCV) Never done    COVID-19 Vaccine (2 - Moderna series) 09/24/2021

## 2022-12-08 LAB
CREAT UR-MCNC: 287 MG/DL
MICROALBUMIN UR-MCNC: 10.7 MG/DL
MICROALBUMIN/CREAT UR-RTO: 37 MG/G (ref 0–30)

## 2022-12-09 LAB
BACTERIA SPEC CULT: NORMAL
SERVICE CMNT-IMP: NORMAL

## 2022-12-19 ENCOUNTER — PATIENT MESSAGE (OUTPATIENT)
Dept: FAMILY MEDICINE CLINIC | Age: 37
End: 2022-12-19

## 2023-01-04 DIAGNOSIS — R11.2 NAUSEA AND VOMITING, UNSPECIFIED VOMITING TYPE: ICD-10-CM

## 2023-01-04 RX ORDER — ONDANSETRON 4 MG/1
TABLET, ORALLY DISINTEGRATING ORAL
Qty: 20 TABLET | Refills: 0 | Status: SHIPPED | OUTPATIENT
Start: 2023-01-04

## 2023-01-09 DIAGNOSIS — E11.9 CONTROLLED TYPE 2 DIABETES MELLITUS WITHOUT COMPLICATION, WITHOUT LONG-TERM CURRENT USE OF INSULIN (HCC): ICD-10-CM

## 2023-01-09 RX ORDER — METFORMIN HYDROCHLORIDE 500 MG/1
TABLET, EXTENDED RELEASE ORAL
Qty: 30 TABLET | Refills: 0 | Status: SHIPPED | OUTPATIENT
Start: 2023-01-09

## 2023-01-12 ENCOUNTER — OFFICE VISIT (OUTPATIENT)
Dept: FAMILY MEDICINE CLINIC | Age: 38
End: 2023-01-12
Payer: MEDICAID

## 2023-01-12 VITALS
HEART RATE: 98 BPM | DIASTOLIC BLOOD PRESSURE: 91 MMHG | SYSTOLIC BLOOD PRESSURE: 137 MMHG | HEIGHT: 65 IN | WEIGHT: 230 LBS | BODY MASS INDEX: 38.32 KG/M2 | RESPIRATION RATE: 20 BRPM | OXYGEN SATURATION: 98 % | TEMPERATURE: 98.1 F

## 2023-01-12 DIAGNOSIS — F43.10 POSTTRAUMATIC STRESS DISORDER: ICD-10-CM

## 2023-01-12 DIAGNOSIS — R35.0 URINARY FREQUENCY: ICD-10-CM

## 2023-01-12 DIAGNOSIS — F32.0 CURRENT MILD EPISODE OF MAJOR DEPRESSIVE DISORDER, UNSPECIFIED WHETHER RECURRENT (HCC): Primary | ICD-10-CM

## 2023-01-12 DIAGNOSIS — E11.9 CONTROLLED TYPE 2 DIABETES MELLITUS WITHOUT COMPLICATION, WITHOUT LONG-TERM CURRENT USE OF INSULIN (HCC): ICD-10-CM

## 2023-01-12 LAB
BILIRUB UR QL STRIP: NORMAL
GLUCOSE UR-MCNC: NEGATIVE MG/DL
HBA1C MFR BLD HPLC: 7.7 %
KETONES P FAST UR STRIP-MCNC: NEGATIVE MG/DL
PH UR STRIP: 6.5 [PH] (ref 4.6–8)
PROT UR QL STRIP: NORMAL
SP GR UR STRIP: 1.03 (ref 1–1.03)
UA UROBILINOGEN AMB POC: NORMAL (ref 0.2–1)
URINALYSIS CLARITY POC: CLEAR
URINALYSIS COLOR POC: YELLOW
URINE BLOOD POC: NORMAL
URINE LEUKOCYTES POC: NEGATIVE
URINE NITRITES POC: NEGATIVE

## 2023-01-12 PROCEDURE — 99214 OFFICE O/P EST MOD 30 MIN: CPT | Performed by: FAMILY MEDICINE

## 2023-01-12 PROCEDURE — 83036 HEMOGLOBIN GLYCOSYLATED A1C: CPT | Performed by: FAMILY MEDICINE

## 2023-01-12 PROCEDURE — 3051F HG A1C>EQUAL 7.0%<8.0%: CPT | Performed by: FAMILY MEDICINE

## 2023-01-12 PROCEDURE — 3075F SYST BP GE 130 - 139MM HG: CPT | Performed by: FAMILY MEDICINE

## 2023-01-12 PROCEDURE — 81003 URINALYSIS AUTO W/O SCOPE: CPT | Performed by: FAMILY MEDICINE

## 2023-01-12 PROCEDURE — 3080F DIAST BP >= 90 MM HG: CPT | Performed by: FAMILY MEDICINE

## 2023-01-12 RX ORDER — PRAZOSIN HYDROCHLORIDE 5 MG/1
5 CAPSULE ORAL
Qty: 30 CAPSULE | Refills: 1 | Status: SHIPPED | OUTPATIENT
Start: 2023-01-12

## 2023-01-12 RX ORDER — OMEPRAZOLE 20 MG/1
20 CAPSULE, DELAYED RELEASE ORAL DAILY
COMMUNITY

## 2023-01-12 RX ORDER — BUSPIRONE HYDROCHLORIDE 10 MG/1
10 TABLET ORAL 2 TIMES DAILY
Qty: 60 TABLET | Refills: 5 | Status: SHIPPED | OUTPATIENT
Start: 2023-01-12

## 2023-01-12 NOTE — PATIENT INSTRUCTIONS
Do not take tamulosin and prazosin together. DASH Diet: Care Instructions  Your Care Instructions     The DASH diet is an eating plan that can help lower your blood pressure. DASH stands for Dietary Approaches to Stop Hypertension. Hypertension is high blood pressure. The DASH diet focuses on eating foods that are high in calcium, potassium, and magnesium. These nutrients can lower blood pressure. The foods that are highest in these nutrients are fruits, vegetables, low-fat dairy products, nuts, seeds, and legumes. But taking calcium, potassium, and magnesium supplements instead of eating foods that are high in those nutrients does not have the same effect. The DASH diet also includes whole grains, fish, and poultry. The DASH diet is one of several lifestyle changes your doctor may recommend to lower your high blood pressure. Your doctor may also want you to decrease the amount of sodium in your diet. Lowering sodium while following the DASH diet can lower blood pressure even further than just the DASH diet alone. Follow-up care is a key part of your treatment and safety. Be sure to make and go to all appointments, and call your doctor if you are having problems. It's also a good idea to know your test results and keep a list of the medicines you take. How can you care for yourself at home? Following the DASH diet  Eat 4 to 5 servings of fruit each day. A serving is 1 medium-sized piece of fruit, ½ cup chopped or canned fruit, 1/4 cup dried fruit, or 4 ounces (½ cup) of fruit juice. Choose fruit more often than fruit juice. Eat 4 to 5 servings of vegetables each day. A serving is 1 cup of lettuce or raw leafy vegetables, ½ cup of chopped or cooked vegetables, or 4 ounces (½ cup) of vegetable juice. Choose vegetables more often than vegetable juice. Get 2 to 3 servings of low-fat and fat-free dairy each day. A serving is 8 ounces of milk, 1 cup of yogurt, or 1 ½ ounces of cheese.   Eat 6 to 8 servings of grains each day. A serving is 1 slice of bread, 1 ounce of dry cereal, or ½ cup of cooked rice, pasta, or cooked cereal. Try to choose whole-grain products as much as possible. Limit lean meat, poultry, and fish to 2 servings each day. A serving is 3 ounces, about the size of a deck of cards. Eat 4 to 5 servings of nuts, seeds, and legumes (cooked dried beans, lentils, and split peas) each week. A serving is 1/3 cup of nuts, 2 tablespoons of seeds, or ½ cup of cooked beans or peas. Limit fats and oils to 2 to 3 servings each day. A serving is 1 teaspoon of vegetable oil or 2 tablespoons of salad dressing. Limit sweets and added sugars to 5 servings or less a week. A serving is 1 tablespoon jelly or jam, ½ cup sorbet, or 1 cup of lemonade. Eat less than 2,300 milligrams (mg) of sodium a day. If you limit your sodium to 1,500 mg a day, you can lower your blood pressure even more. Be aware that all of these are the suggested number of servings for people who eat 1,800 to 2,000 calories a day. Your recommended number of servings may be different if you need more or fewer calories. Tips for success  Start small. Do not try to make dramatic changes to your diet all at once. You might feel that you are missing out on your favorite foods and then be more likely to not follow the plan. Make small changes, and stick with them. Once those changes become habit, add a few more changes. Try some of the following:  Make it a goal to eat a fruit or vegetable at every meal and at snacks. This will make it easy to get the recommended amount of fruits and vegetables each day. Try yogurt topped with fruit and nuts for a snack or healthy dessert. Add lettuce, tomato, cucumber, and onion to sandwiches. Combine a ready-made pizza crust with low-fat mozzarella cheese and lots of vegetable toppings. Try using tomatoes, squash, spinach, broccoli, carrots, cauliflower, and onions.   Have a variety of cut-up vegetables with a low-fat dip as an appetizer instead of chips and dip. Sprinkle sunflower seeds or chopped almonds over salads. Or try adding chopped walnuts or almonds to cooked vegetables. Try some vegetarian meals using beans and peas. Add garbanzo or kidney beans to salads. Make burritos and tacos with mashed jhaveri beans or black beans. Where can you learn more? Go to http://www.[employer].com/  Enter H967 in the search box to learn more about \"DASH Diet: Care Instructions. \"  Current as of: January 10, 2022               Content Version: 13.4  © 9341-2094 [employer]. Care instructions adapted under license by [employer] (which disclaims liability or warranty for this information). If you have questions about a medical condition or this instruction, always ask your healthcare professional. [address] any warranty or liability for your use of this information.

## 2023-01-12 NOTE — PROGRESS NOTES
[facility]    History of Present Illness:   Savannah Talbot is a 30 y.o. female here for   Chief Complaint   Patient presents with    Follow Up Chronic Condition     DM         HPI:  Patient here for follow-up diabetes. Recently started on metformin after Victoza increased nausea likely due to gastroparesis. She is tolerating the metformin well. Lab Results   Component Value Date/Time    Hemoglobin A1c 6.5 (H) 08/18/2022 11:45 AM    Hemoglobin A1c (POC) 7.7 01/12/2023 12:06 PM   Also thinks she may have another kidney stone. She has a history of this. She has been given prazosin at bedtime to help with sleep. I called her pharmacy and it was written in September 2022 by  From [facility]. She had been taking 1 mg at bedtime but says it is not helping. Says her pain is worse today as she has been out of her gabapentin. Filled by Julieta Avila MD on 1/6/23. She requests a refill of BuSpar today.       Health Maintenance  Health Maintenance Due   Topic Date Due    Hepatitis C Screening  Never done    Pneumococcal 0-64 years (1 - PCV) Never done    Hepatitis B Vaccine (1 of 3 - Risk 3-dose series) Never done    COVID-19 Vaccine (2 - Moderna series) 09/24/2021       Past Medical, Family, and Social History:     Past Medical History:   Diagnosis Date    Anemia     receiving IV iron last dose in 5/2020    Arthritis     RA    Asthma     inhaler PRN    Epilepsy ([facility] [facility]     2014    Essential hypertension     Gastroparesis     Generalized convulsive epilepsy ([facility] [address] 08/01/2022    Gestational diabetes     Heart abnormality     mild heart murmur no medications required    Herpes simplex virus (HSV) infection     Ill-defined condition     fibromyalgia    Infertility, female     Liver disease     fatty liver    Low back pain     Obstructive sleep apnea 08/01/2022    Polycystic disease, ovaries     Psychiatric problem     depression, PTSD      Past Surgical History:   Procedure Laterality Date    HX OTHER SURGICAL      appendix, gallbladder, tonsils & wisdom teeth, Nissen fundoplication    HX TONSILLECTOMY      IA GASTRIC RSTCV W/BYP W/SHORT LIMB 150 CM/<      pyloraplasty        Current Outpatient Medications   Medication Sig Dispense Refill    busPIRone (BUSPAR) 10 mg tablet Take 1 Tablet by mouth two (2) times a day. 60 Tablet 5    omeprazole (PRILOSEC) 20 mg capsule Take 20 mg by mouth daily. prazosin (MINIPRESS) 1 mg capsule Take 1 Capsule by mouth nightly. 30 Capsule 1    metFORMIN ER (GLUCOPHAGE XR) 500 mg tablet TAKE 1 TABLET BY MOUTH DAILY WITH DINNER 30 Tablet 0    ondansetron (ZOFRAN ODT) 4 mg disintegrating tablet DISSOLVE 1 TABLET ON THETONGUE EVERY 8 HOURS AS NEEDED FOR NAUSEA/VOMITING 20 Tablet 0    albuterol (PROVENTIL HFA, VENTOLIN HFA, PROAIR HFA) 90 mcg/actuation inhaler ProAir HFA 90 mcg/actuation aerosol inhaler      capsicum oleoresin 0.025 % topical cream       glucose blood VI test strips strip Check fsbs daily 100 Strip 3    DULoxetine (CYMBALTA) 30 mg capsule TAKE ONE CAPSULE BY MOUTH EVERY MORNING 30 Capsule 0    Insulin Needles, Disposable, (Pen Needles) 31 gauge x 1/4\" ndle Use daily with victoza 90 Pen Needle 1    amLODIPine (NORVASC) 5 mg tablet TAKE ONE TABLET BY MOUTH EVERY MORNING 30 Tablet 5    gabapentin (NEURONTIN) 300 mg capsule Take 300 mg by mouth three (3) times daily. nystatin (MYCOSTATIN) 100,000 unit/mL suspension As needed      Blood-Glucose Meter monitoring kit Check fsbs daily dx E11.9 1 Kit 0    famotidine (PEPCID) 20 mg tablet Take 20 mg by mouth two (2) times a day. (Patient not taking: Reported on 1/12/2023)             Patient Active Problem List   Diagnosis Code    Depression F32. A    Steatosis of liver K76.0    Presence of intrauterine contraceptive device Z97.5    Posttraumatic stress disorder F43.10    Migraine without aura G43.009    Low back pain M54.50    Hiatal hernia K44.9    Fibromyalgia M79.7    Asthma J45.909    Anxiety F41.9 Cannabis abuse F12.10    Benign hypertension I10    Gastroparesis K31.84    Controlled type 2 diabetes mellitus without complication, without long-term current use of insulin (HCC) E11.9       Social History     Socioeconomic History    Marital status: SINGLE    Highest education level: High school graduate   Occupational History    Occupation: PCA   Tobacco Use    Smoking status: Former     Packs/day: 0.50     Types: Cigarettes     Quit date: 2022     Years since quittin.2    Smokeless tobacco: Never   Vaping Use    Vaping Use: Never used   Substance and Sexual Activity    Alcohol use: Never     Comment: in recovery since age 26    Drug use: Yes     Frequency: 7.0 times per week     Types: Marijuana     Comment: medically purposes due to PTSD     Social Determinants of Health     Financial Resource Strain: High Risk    Difficulty of Paying Living Expenses: Very hard   Food Insecurity: No Food Insecurity    Worried About Running Out of Food in the Last Year: Never true    Ran Out of Food in the Last Year: Never true        Review of Systems   Review of Systems   Constitutional:  Negative for chills and fever. Respiratory:  Negative for cough and shortness of breath. Cardiovascular:  Negative for chest pain. Gastrointestinal:  Negative for abdominal pain.      Objective:   Visit Vitals  BP (!) 137/91   Pulse 98   Temp 98.1 °F (36.7 °C) (Oral)   Resp 20   Ht 5' 5\" (1.651 m)   Wt 230 lb (104.3 kg)   SpO2 98%   BMI 38.27 kg/m²        Physical Exam  PHYSICAL EXAM:  Gen: Pt sitting in chair, in NAD  Head: Normocephalic, atraumatic  Eyes: Sclera anicteric, EOM grossly intact,  CVS: Normal S1, S2, no m/r/g  Resp: CTAB, no wheezes or rales  Abd: Soft, non-tender, non-distended, +BS  Neuro: Alert, oriented, appropriate    Pertinent Labs/Studies:  3 most recent PHQ Screens 2023   Little interest or pleasure in doing things Not at all   Feeling down, depressed, irritable, or hopeless Not at all   Total Score PHQ 2 0   Trouble falling or staying asleep, or sleeping too much More than half the days   Feeling tired or having little energy Not at all   Poor appetite, weight loss, or overeating Not at all   Feeling bad about yourself - or that you are a failure or have let yourself or your family down Not at all   Trouble concentrating on things such as school, work, reading, or watching TV Not at all   Moving or speaking so slowly that other people could have noticed; or the opposite being so fidgety that others notice Not at all   Thoughts of being better off dead, or hurting yourself in some way Not at all   PHQ 9 Score 2   How difficult have these problems made it for you to do your work, take care of your home and get along with others -        Testing preformed onsite at today's visit:  Results for orders placed or performed in visit on 01/12/23   AMB POC URINALYSIS DIP STICK AUTO W/O MICRO   Result Value Ref Range    Color (UA POC) Yellow     Clarity (UA POC) Clear     Glucose (UA POC) Negative Negative    Bilirubin (UA POC) 1+ Negative    Ketones (UA POC) Negative Negative    Specific gravity (UA POC) 1.030 1.001 - 1.035    Blood (UA POC) Trace Negative    pH (UA POC) 6.5 4.6 - 8.0    Protein (UA POC) 2+ Negative    Urobilinogen (UA POC) 0.2 mg/dL 0.2 - 1    Nitrites (UA POC) Negative Negative    Leukocyte esterase (UA POC) Negative Negative   AMB POC HEMOGLOBIN A1C   Result Value Ref Range    Hemoglobin A1c (POC) 7.7 %         Assessment and orders:       ICD-10-CM ICD-9-CM    1. Current mild episode of major depressive disorder, unspecified whether recurrent ([facility]  F32.0 296.21       2. Posttraumatic stress disorder  F43.10 309.81 busPIRone (BUSPAR) 10 mg tablet      prazosin (MINIPRESS) 5 mg capsule      3. Urinary frequency  R35.0 788.41 AMB POC URINALYSIS DIP STICK AUTO W/O MICRO      4.  Controlled type 2 diabetes mellitus without complication, without long-term current use of insulin (HCC)  E11.9 250.00 AMB POC HEMOGLOBIN A1C        Diagnoses and all orders for this visit:    1. Current mild episode of major depressive disorder, unspecified whether recurrent ([facility]  Assessment & Plan:   stable on current meds      2. Posttraumatic stress disorder  -     busPIRone (BUSPAR) 10 mg tablet; Take 1 Tablet by mouth two (2) times a day. -     prazosin (MINIPRESS) 5 mg capsule; Take 1 Capsule by mouth nightly. 3. Urinary frequency  -     AMB POC URINALYSIS DIP STICK AUTO W/O MICRO    4. Controlled type 2 diabetes mellitus without complication, without long-term current use of insulin ([facility]  Assessment & Plan:   borderline controlled, continue current medications   Lab Results   Component Value Date/Time    Hemoglobin A1c 6.5 (H) 08/18/2022 11:45 AM    Hemoglobin A1c (POC) 7.7 01/12/2023 12:06 PM         Orders:  -     AMB POC HEMOGLOBIN A1C    Follow-up and Dispositions    Return in about 3 months (around 4/12/2023) for diabetes. the following changes in treatment are made: increase prazosin, may help with bp as well. Nurse bp recheck in 2 weeks. reviewed medications and side effects in detail    Spent 35 min with patient, reviewing chart and face to face exam, clinical documentation. I have discussed the diagnosis with the patient and the intended plan as seen in the above orders. Social history, medical history, and labs were reviewed. The patient has received an after-visit summary and questions were answered concerning future plans. I have discussed medication side effects and warnings with the patient as well. Patient/guardian verbalized understanding and accepts plan & risks. Please note that this dictation was completed with Q[employer] on [employer], the [employer] voice recognition software. Quite often unanticipated grammatical, syntax, homophones, and other interpretive errors are inadvertently transcribed by the computer software. Please disregard these errors.   Please excuse any errors that have escaped final proofreading. Thank you.      MD MARION Chu & ROBERT GOYAL [facility]  01/12/23

## 2023-01-12 NOTE — ASSESSMENT & PLAN NOTE
borderline controlled, continue current medications   Lab Results   Component Value Date/Time    Hemoglobin A1c 6.5 (H) 08/18/2022 11:45 AM    Hemoglobin A1c (POC) 7.7 01/12/2023 12:06 PM

## 2023-01-12 NOTE — PROGRESS NOTES
1. \"Have you been to the ER, urgent care clinic since your last visit? Hospitalized since your last visit? \" No    2. \"Have you seen or consulted any other health care providers outside of the [address] [place] [address] since your last visit? \" No     3. For patients aged 39-70: Has the patient had a colonoscopy / FIT/ Cologuard? No      If the patient is female:    4. For patients aged 41-77: Has the patient had a mammogram within the past 2 years? NA - based on age or sex      11. For patients aged 21-65: Has the patient had a pap smear?  NA - based on age or sex    Health Maintenance Due   Topic Date Due    Hepatitis C Screening  Never done    Pneumococcal 0-64 years (1 - PCV) Never done    Hepatitis B Vaccine (1 of 3 - Risk 3-dose series) Never done    COVID-19 Vaccine (2 - Moderna series) 09/24/2021

## 2023-01-17 ENCOUNTER — TELEPHONE (OUTPATIENT)
Dept: FAMILY MEDICINE CLINIC | Age: 38
End: 2023-01-17

## 2023-01-17 NOTE — TELEPHONE ENCOUNTER
Patient message was sent to patient regarding messages she has been sending through patient portal.      Pt states she missed a call. Please advise.

## 2023-02-08 NOTE — PROGRESS NOTES
Pt rec'd via stretcher from [id number] to [facility] for CT guided drain placement. Pt is alert and oriented x 3, a little restless. Pt describes lower abdominal pain 8/10 at bikini line incision. Lungs CTA, respirations non-labored but slightly rapid. [address] Dr. Oden Given in to discuss procedure, risks and benefits; Plan for sedation made and consent obtained. Pt's PIV 22 GA to R Forearm does not flush easily, no blood return and pt states is painful. Removed and 22 ga PIV initiated to L posterior forearm. 1330 To CT. 1430 Procedure complete. First med  9075, procedure start 1400, procedure end 1430. Total of Versed 7 mg and Fentanyl 125 mcg IV used for sedation. 10 FR pigtail drain to lower mid abdomen and attached to accordion drain. Draining foul smelling thick pale green fluid. Sample to lab. To [facility] via stretcher for monitored recovery. 1445 Pt up to restroom with assist and back to stretcher without difficulty. Drain remains charged and patent. Report called to pt's nurse. 1500 To room via stretcher with transport. Sarecycline Counseling: Patient advised regarding possible photosensitivity and discoloration of the teeth, skin, lips, tongue and gums.  Patient instructed to avoid sunlight, if possible.  When exposed to sunlight, patients should wear protective clothing, sunglasses, and sunscreen.  The patient was instructed to call the office immediately if the following severe adverse effects occur:  hearing changes, easy bruising/bleeding, severe headache, or vision changes.  The patient verbalized understanding of the proper use and possible adverse effects of sarecycline.  All of the patient's questions and concerns were addressed.

## 2023-03-07 ENCOUNTER — PATIENT MESSAGE (OUTPATIENT)
Dept: FAMILY MEDICINE CLINIC | Age: 38
End: 2023-03-07

## 2023-03-22 ENCOUNTER — OFFICE VISIT (OUTPATIENT)
Dept: FAMILY MEDICINE CLINIC | Age: 38
End: 2023-03-22
Payer: MEDICAID

## 2023-03-22 VITALS
HEIGHT: 65 IN | OXYGEN SATURATION: 99 % | DIASTOLIC BLOOD PRESSURE: 92 MMHG | RESPIRATION RATE: 18 BRPM | HEART RATE: 100 BPM | BODY MASS INDEX: 39.65 KG/M2 | TEMPERATURE: 98 F | WEIGHT: 238 LBS | SYSTOLIC BLOOD PRESSURE: 131 MMHG

## 2023-03-22 DIAGNOSIS — N93.8 DUB (DYSFUNCTIONAL UTERINE BLEEDING): ICD-10-CM

## 2023-03-22 DIAGNOSIS — I10 BENIGN HYPERTENSION: ICD-10-CM

## 2023-03-22 DIAGNOSIS — R56.9 SEIZURE (HCC): Primary | ICD-10-CM

## 2023-03-22 PROCEDURE — 3074F SYST BP LT 130 MM HG: CPT | Performed by: FAMILY MEDICINE

## 2023-03-22 PROCEDURE — 3078F DIAST BP <80 MM HG: CPT | Performed by: FAMILY MEDICINE

## 2023-03-22 PROCEDURE — 99213 OFFICE O/P EST LOW 20 MIN: CPT | Performed by: FAMILY MEDICINE

## 2023-03-22 NOTE — PROGRESS NOTES
1. \"Have you been to the ER, urgent care clinic since your last visit? Hospitalized since your last visit? \" No    2. \"Have you seen or consulted any other health care providers outside of the [address] [place] [address] since your last visit? \" No     3. For patients aged 39-70: Has the patient had a colonoscopy / FIT/ Cologuard? NA - based on age      If the patient is female:    4. For patients aged 41-77: Has the patient had a mammogram within the past 2 years? NA - based on age or sex      11. For patients aged 21-65: Has the patient had a pap smear?  Yes - no Care Gap present    Health Maintenance Due   Topic Date Due    Hepatitis C Screening  Never done    Varicella Vaccine (1 of 2 - 2-dose childhood series) Never done    Pneumococcal 0-64 years (1 - PCV) Never done    Hepatitis B Vaccine (1 of 3 - Risk 3-dose series) Never done    COVID-19 Vaccine (2 - Moderna series) 09/24/2021

## 2023-03-22 NOTE — PATIENT INSTRUCTIONS
DASH Diet: Care Instructions  Your Care Instructions     The DASH diet is an eating plan that can help lower your blood pressure. DASH stands for Dietary Approaches to Stop Hypertension. Hypertension is high blood pressure. The DASH diet focuses on eating foods that are high in calcium, potassium, and magnesium. These nutrients can lower blood pressure. The foods that are highest in these nutrients are fruits, vegetables, low-fat dairy products, nuts, seeds, and legumes. But taking calcium, potassium, and magnesium supplements instead of eating foods that are high in those nutrients does not have the same effect. The DASH diet also includes whole grains, fish, and poultry. The DASH diet is one of several lifestyle changes your doctor may recommend to lower your high blood pressure. Your doctor may also want you to decrease the amount of sodium in your diet. Lowering sodium while following the DASH diet can lower blood pressure even further than just the DASH diet alone. Follow-up care is a key part of your treatment and safety. Be sure to make and go to all appointments, and call your doctor if you are having problems. It's also a good idea to know your test results and keep a list of the medicines you take. How can you care for yourself at home? Following the DASH diet  Eat 4 to 5 servings of fruit each day. A serving is 1 medium-sized piece of fruit, ½ cup chopped or canned fruit, 1/4 cup dried fruit, or 4 ounces (½ cup) of fruit juice. Choose fruit more often than fruit juice. Eat 4 to 5 servings of vegetables each day. A serving is 1 cup of lettuce or raw leafy vegetables, ½ cup of chopped or cooked vegetables, or 4 ounces (½ cup) of vegetable juice. Choose vegetables more often than vegetable juice. Get 2 to 3 servings of low-fat and fat-free dairy each day. A serving is 8 ounces of milk, 1 cup of yogurt, or 1 ½ ounces of cheese. Eat 6 to 8 servings of grains each day.  A serving is 1 slice of bread, 1 ounce of dry cereal, or ½ cup of cooked rice, pasta, or cooked cereal. Try to choose whole-grain products as much as possible. Limit lean meat, poultry, and fish to 2 servings each day. A serving is 3 ounces, about the size of a deck of cards. Eat 4 to 5 servings of nuts, seeds, and legumes (cooked dried beans, lentils, and split peas) each week. A serving is 1/3 cup of nuts, 2 tablespoons of seeds, or ½ cup of cooked beans or peas. Limit fats and oils to 2 to 3 servings each day. A serving is 1 teaspoon of vegetable oil or 2 tablespoons of salad dressing. Limit sweets and added sugars to 5 servings or less a week. A serving is 1 tablespoon jelly or jam, ½ cup sorbet, or 1 cup of lemonade. Eat less than 2,300 milligrams (mg) of sodium a day. If you limit your sodium to 1,500 mg a day, you can lower your blood pressure even more. Be aware that all of these are the suggested number of servings for people who eat 1,800 to 2,000 calories a day. Your recommended number of servings may be different if you need more or fewer calories. Tips for success  Start small. Do not try to make dramatic changes to your diet all at once. You might feel that you are missing out on your favorite foods and then be more likely to not follow the plan. Make small changes, and stick with them. Once those changes become habit, add a few more changes. Try some of the following:  Make it a goal to eat a fruit or vegetable at every meal and at snacks. This will make it easy to get the recommended amount of fruits and vegetables each day. Try yogurt topped with fruit and nuts for a snack or healthy dessert. Add lettuce, tomato, cucumber, and onion to sandwiches. Combine a ready-made pizza crust with low-fat mozzarella cheese and lots of vegetable toppings. Try using tomatoes, squash, spinach, broccoli, carrots, cauliflower, and onions.   Have a variety of cut-up vegetables with a low-fat dip as an appetizer instead of chips and dip.  Sprinkle sunflower seeds or chopped almonds over salads. Or try adding chopped walnuts or almonds to cooked vegetables. Try some vegetarian meals using beans and peas. Add garbanzo or kidney beans to salads. Make burritos and tacos with mashed jhaveri beans or black beans. Where can you learn more? Go to http://www.[employer].com/  Enter H967 in the search box to learn more about \"DASH Diet: Care Instructions. \"  Current as of: January 10, 2022               Content Version: 13.4  © 6615-3355 [employer]. Care instructions adapted under license by [employer] (which disclaims liability or warranty for this information). If you have questions about a medical condition or this instruction, always ask your healthcare professional. [address] any warranty or liability for your use of this information.

## 2023-03-22 NOTE — PROGRESS NOTES
[facility]    History of Present Illness:   Mau Borja is a 30 y.o. female here for   Chief Complaint   Patient presents with    Referral Request     Neurology   [facility]     Vaginal Bleeding     X1 month          HPI:  Here for neurology referral.  H/o seizures. She has been flagged by the [facility]. Her neurologist retired years ago Dr. Ron Mirza. He was with [facility] neurology. Last known seizure in 2005? On gabapentin for pain only, started a few months ago. Had IUD placed two years ago. Bleeding for a month now, just spotting. Having to use pad daily now. Not presently sexually active. She had IUE previously -- copper then and had heavy bleeding so switched to Mirena. This is the first time she has had an issue with it. She is not bleeding now. Normally bleeds 3 days every month. LMP 2/22/23    Did not take her blood pressure pill today.     Health Maintenance  Health Maintenance Due   Topic Date Due    Hepatitis C Screening  Never done    Varicella Vaccine (1 of 2 - 2-dose childhood series) Never done    Pneumococcal 0-64 years (1 - PCV) Never done    Hepatitis B Vaccine (1 of 3 - Risk 3-dose series) Never done    COVID-19 Vaccine (2 - Moderna series) 09/24/2021       Past Medical, Family, and Social History:     Past Medical History:   Diagnosis Date    Anemia     receiving IV iron last dose in 5/2020    Arthritis     RA    Asthma     inhaler PRN    Epilepsy ([address]     2014    Essential hypertension     Gastroparesis     Generalized convulsive epilepsy ([address] 08/01/2022    Gestational diabetes     Heart abnormality     mild heart murmur no medications required    Herpes simplex virus (HSV) infection     Ill-defined condition     fibromyalgia    Infertility, female     Liver disease     fatty liver    Low back pain     Obstructive sleep apnea 08/01/2022    Polycystic disease, ovaries     Psychiatric problem     depression, PTSD      Past Surgical History:   Procedure Laterality Date    HX OTHER SURGICAL      appendix, gallbladder, tonsils & wisdom teeth, Nissen fundoplication    HX TONSILLECTOMY      TN GASTRIC RSTCV W/BYP W/SHORT LIMB 150 CM/<      pyloraplasty        Current Outpatient Medications on File Prior to Visit   Medication Sig Dispense Refill    metFORMIN ER (GLUCOPHAGE XR) 500 mg tablet TAKE 1 TABLET BY MOUTH DAILY WITH DINNER 30 Tablet 5    ondansetron (ZOFRAN ODT) 4 mg disintegrating tablet DISSOLVE 1 TABLET ON THE TONGUE EVERY 8 HOURS AS NEEDED FOR NAUSEA/VOMITING 20 Tablet 1    DULoxetine (CYMBALTA) 30 mg capsule TAKE ONE CAPSULE BY MOUTH EVERY MORNING 30 Capsule 5    busPIRone (BUSPAR) 10 mg tablet Take 1 Tablet by mouth two (2) times a day. 60 Tablet 5    omeprazole (PRILOSEC) 20 mg capsule Take 20 mg by mouth daily. albuterol (PROVENTIL HFA, VENTOLIN HFA, PROAIR HFA) 90 mcg/actuation inhaler ProAir HFA 90 mcg/actuation aerosol inhaler      capsicum oleoresin 0.025 % topical cream       amLODIPine (NORVASC) 5 mg tablet TAKE ONE TABLET BY MOUTH EVERY MORNING 30 Tablet 5    gabapentin (NEURONTIN) 300 mg capsule Take 300 mg by mouth three (3) times daily. nystatin (MYCOSTATIN) 100,000 unit/mL suspension As needed      prazosin (MINIPRESS) 5 mg capsule Take 1 Capsule by mouth nightly. 30 Capsule 1    Blood-Glucose Meter monitoring kit Check fsbs daily dx E11.9 1 Kit 0    glucose blood VI test strips strip Check fsbs daily 100 Strip 3    Insulin Needles, Disposable, (Pen Needles) 31 gauge x 1/4\" ndle Use daily with victoza 90 Pen Needle 1     No current facility-administered medications on file prior to visit. Patient Active Problem List   Diagnosis Code    Depression F32. A    Steatosis of liver K76.0    Presence of intrauterine contraceptive device Z97.5    Posttraumatic stress disorder F43.10    Migraine without aura G43.009    Low back pain M54.50    Hiatal hernia K44.9    Fibromyalgia M79.7    Asthma J45.909    Anxiety F41.9 Cannabis abuse F12.10    Benign hypertension I10    Gastroparesis K31.84    Controlled type 2 diabetes mellitus without complication, without long-term current use of insulin (HCC) E11.9       Social History     Socioeconomic History    Marital status: SINGLE    Highest education level: High school graduate   Occupational History    Occupation: PCA   Tobacco Use    Smoking status: Former     Packs/day: 0.50     Types: Cigarettes     Quit date: 2022     Years since quittin.4    Smokeless tobacco: Never   Vaping Use    Vaping Use: Never used   Substance and Sexual Activity    Alcohol use: Never     Comment: in recovery since age 26    Drug use: Yes     Frequency: 7.0 times per week     Types: Marijuana     Comment: medically purposes due to PTSD     Social Determinants of Health     Financial Resource Strain: High Risk    Difficulty of Paying Living Expenses: Very hard   Food Insecurity: No Food Insecurity    Worried About Running Out of Food in the Last Year: Never true    Ran Out of Food in the Last Year: Never true        Review of Systems   Review of Systems   Constitutional:  Negative for chills and fever. Respiratory:  Negative for cough and shortness of breath. Gastrointestinal:  Positive for nausea and vomiting.      Objective:   Visit Vitals  BP (!) 131/92   Pulse 100   Temp 98 °F (36.7 °C) (Oral)   Resp 18   Ht 5' 5\" (1.651 m)   Wt 238 lb (108 kg)   SpO2 99%   BMI 39.61 kg/m²        Physical Exam  PHYSICAL EXAM:  Gen: Pt sitting in chair, in NAD  Head: Normocephalic, atraumatic  Eyes: Sclera anicteric, EOM grossly intact,  Neck: Supple, no LAD, no thyromegaly  CVS: Normal S1, S2, no m/r/g  Resp: CTAB, no wheezes or rales  Neuro: Alert, oriented, appropriate      Assessment and orders:       ICD-10-CM ICD-9-CM    1. Seizure (HCC)  R56.9 780.39 REFERRAL TO NEUROLOGY      2. DUB (dysfunctional uterine bleeding)  N93.8 626.8 CBC WITH AUTOMATED DIFF      METABOLIC PANEL, COMPREHENSIVE      TSH 3RD GENERATION      TSH 3RD GENERATION      METABOLIC PANEL, COMPREHENSIVE      CBC WITH AUTOMATED DIFF      3. Benign hypertension  I10 401.1         Diagnoses and all orders for this visit:    1. Seizure ([address]  -     REFERRAL TO NEUROLOGY    2. DUB (dysfunctional uterine bleeding)  -     CBC WITH AUTOMATED DIFF; Future  -     METABOLIC PANEL, COMPREHENSIVE; Future  -     TSH 3RD GENERATION; Future    3. Benign hypertension    Patient Instructions  I advised reduction of dietary salt intake, DASH diet, take medications as prescribed and exercise daily. Follow-up and Dispositions    Return in about 5 weeks (around 4/27/2023) for Hypertension. lab results and schedule of future lab studies reviewed with patient    I have discussed the diagnosis with the patient and the intended plan as seen in the above orders. Social history, medical history, and labs were reviewed. The patient has received an after-visit summary and questions were answered concerning future plans. I have discussed medication side effects and warnings with the patient as well. Patient/guardian verbalized understanding and accepts plan & risks. Please note that this dictation was completed with [employer], the computer voice recognition software. Quite often unanticipated grammatical, syntax, homophones, and other interpretive errors are inadvertently transcribed by the computer software. Please disregard these errors. Please excuse any errors that have escaped final proofreading. Thank you.      Adeline Goodell, MD PRISCILLA CHAN & ROBERT GOYAL [facility]  03/22/23

## 2023-03-23 LAB
ALBUMIN SERPL-MCNC: 3.8 G/DL (ref 3.5–5)
ALBUMIN/GLOB SERPL: 1.1 (ref 1.1–2.2)
ALP SERPL-CCNC: 111 U/L (ref 45–117)
ALT SERPL-CCNC: 38 U/L (ref 12–78)
ANION GAP SERPL CALC-SCNC: 5 MMOL/L (ref 5–15)
AST SERPL-CCNC: 27 U/L (ref 15–37)
BASOPHILS # BLD: 0.1 K/UL (ref 0–0.1)
BASOPHILS NFR BLD: 1 % (ref 0–1)
BILIRUB SERPL-MCNC: 0.4 MG/DL (ref 0.2–1)
BUN SERPL-MCNC: 6 MG/DL (ref 6–20)
BUN/CREAT SERPL: 9 (ref 12–20)
CALCIUM SERPL-MCNC: 9.5 MG/DL (ref 8.5–10.1)
CHLORIDE SERPL-SCNC: 109 MMOL/L (ref 97–108)
CO2 SERPL-SCNC: 25 MMOL/L (ref 21–32)
CREAT SERPL-MCNC: 0.68 MG/DL (ref 0.55–1.02)
DIFFERENTIAL METHOD BLD: ABNORMAL
EOSINOPHIL # BLD: 0.4 K/UL (ref 0–0.4)
EOSINOPHIL NFR BLD: 3 % (ref 0–7)
ERYTHROCYTE [DISTWIDTH] IN BLOOD BY AUTOMATED COUNT: 13.8 % (ref 11.5–14.5)
GLOBULIN SER CALC-MCNC: 3.6 G/DL (ref 2–4)
GLUCOSE SERPL-MCNC: 153 MG/DL (ref 65–100)
HCT VFR BLD AUTO: 48.7 % (ref 35–47)
HGB BLD-MCNC: 15.6 G/DL (ref 11.5–16)
IMM GRANULOCYTES # BLD AUTO: 0.1 K/UL (ref 0–0.04)
IMM GRANULOCYTES NFR BLD AUTO: 1 % (ref 0–0.5)
LYMPHOCYTES # BLD: 3.7 K/UL (ref 0.8–3.5)
LYMPHOCYTES NFR BLD: 25 % (ref 12–49)
MCH RBC QN AUTO: 30.1 PG (ref 26–34)
MCHC RBC AUTO-ENTMCNC: 32 G/DL (ref 30–36.5)
MCV RBC AUTO: 94 FL (ref 80–99)
MONOCYTES # BLD: 0.7 K/UL (ref 0–1)
MONOCYTES NFR BLD: 5 % (ref 5–13)
NEUTS SEG # BLD: 9.9 K/UL (ref 1.8–8)
NEUTS SEG NFR BLD: 65 % (ref 32–75)
NRBC # BLD: 0 K/UL (ref 0–0.01)
NRBC BLD-RTO: 0 PER 100 WBC
PLATELET # BLD AUTO: 495 K/UL (ref 150–400)
PMV BLD AUTO: 9.9 FL (ref 8.9–12.9)
POTASSIUM SERPL-SCNC: 4 MMOL/L (ref 3.5–5.1)
PROT SERPL-MCNC: 7.4 G/DL (ref 6.4–8.2)
RBC # BLD AUTO: 5.18 M/UL (ref 3.8–5.2)
SODIUM SERPL-SCNC: 139 MMOL/L (ref 136–145)
TSH SERPL DL<=0.05 MIU/L-ACNC: 1.07 UIU/ML (ref 0.36–3.74)
WBC # BLD AUTO: 14.8 K/UL (ref 3.6–11)

## 2023-03-24 NOTE — PROGRESS NOTES
Note sent to patient: Your glucose was not terrible but could be better. Unfortunately insurance will over cover the dexacom if you are on insulin. Just keep checking it daily and we will check your A1C when you come back in April. Your white count was up again and we have never done the peripheral smear that I ordered previously as it did come back down to normal.  I can draw that test when you come back next month as well.

## 2023-03-27 ENCOUNTER — PATIENT MESSAGE (OUTPATIENT)
Dept: FAMILY MEDICINE CLINIC | Age: 38
End: 2023-03-27

## 2023-04-04 RX ORDER — ONDANSETRON 4 MG/1
TABLET, ORALLY DISINTEGRATING ORAL
Qty: 20 TABLET | Refills: 1 | Status: SHIPPED
Start: 2023-04-04

## 2023-04-18 ENCOUNTER — TELEPHONE (OUTPATIENT)
Dept: FAMILY MEDICINE CLINIC | Age: 38
End: 2023-04-18

## 2023-04-18 DIAGNOSIS — Z30.09 ENCOUNTER FOR OTHER GENERAL COUNSELING OR ADVICE ON CONTRACEPTION: Primary | ICD-10-CM

## 2023-04-18 NOTE — TELEPHONE ENCOUNTER
----- Message from Mojgan Singh sent at 4/18/2023  1:12 PM EDT -----  Subject: Referral Request    Reason for referral request? patient would like to be referred to a doctor   who could have her tubes tied  Provider patient wants to be referred to(if known):     Provider Phone Number(if known):     Additional Information for Provider?   ---------------------------------------------------------------------------  --------------  [id number]    [phone]; OK to leave message on voicemail  ---------------------------------------------------------------------------  --------------

## 2023-04-26 DIAGNOSIS — I10 BENIGN HYPERTENSION: ICD-10-CM

## 2023-04-26 RX ORDER — AMLODIPINE BESYLATE 5 MG/1
TABLET ORAL
Qty: 30 TABLET | Refills: 5 | Status: SHIPPED | OUTPATIENT
Start: 2023-04-26

## 2023-05-05 DIAGNOSIS — R11.2 NAUSEA AND VOMITING, UNSPECIFIED VOMITING TYPE: ICD-10-CM

## 2023-05-05 RX ORDER — ONDANSETRON 4 MG/1
TABLET, ORALLY DISINTEGRATING ORAL
Qty: 20 TABLET | Refills: 1 | Status: SHIPPED | OUTPATIENT
Start: 2023-05-05

## 2023-05-17 ENCOUNTER — OFFICE VISIT (OUTPATIENT)
Facility: CLINIC | Age: 38
End: 2023-05-17
Payer: MEDICAID

## 2023-05-17 VITALS
WEIGHT: 233 LBS | TEMPERATURE: 98.5 F | SYSTOLIC BLOOD PRESSURE: 129 MMHG | OXYGEN SATURATION: 96 % | BODY MASS INDEX: 38.82 KG/M2 | HEART RATE: 101 BPM | DIASTOLIC BLOOD PRESSURE: 83 MMHG | RESPIRATION RATE: 17 BRPM | HEIGHT: 65 IN

## 2023-05-17 DIAGNOSIS — E11.9 CONTROLLED TYPE 2 DIABETES MELLITUS WITHOUT COMPLICATION, WITHOUT LONG-TERM CURRENT USE OF INSULIN (HCC): Primary | ICD-10-CM

## 2023-05-17 DIAGNOSIS — F32.0 MAJOR DEPRESSIVE DISORDER, SINGLE EPISODE, MILD (HCC): ICD-10-CM

## 2023-05-17 DIAGNOSIS — Z72.0 TOBACCO ABUSE: ICD-10-CM

## 2023-05-17 DIAGNOSIS — R11.2 NAUSEA AND VOMITING, UNSPECIFIED VOMITING TYPE: ICD-10-CM

## 2023-05-17 LAB — HBA1C MFR BLD: 8.2 %

## 2023-05-17 PROCEDURE — 99214 OFFICE O/P EST MOD 30 MIN: CPT | Performed by: FAMILY MEDICINE

## 2023-05-17 PROCEDURE — 3074F SYST BP LT 130 MM HG: CPT | Performed by: FAMILY MEDICINE

## 2023-05-17 PROCEDURE — 83036 HEMOGLOBIN GLYCOSYLATED A1C: CPT | Performed by: FAMILY MEDICINE

## 2023-05-17 PROCEDURE — 3079F DIAST BP 80-89 MM HG: CPT | Performed by: FAMILY MEDICINE

## 2023-05-17 RX ORDER — DULOXETIN HYDROCHLORIDE 60 MG/1
60 CAPSULE, DELAYED RELEASE ORAL EVERY MORNING
Qty: 90 CAPSULE | Refills: 1 | Status: SHIPPED | OUTPATIENT
Start: 2023-05-17

## 2023-05-17 RX ORDER — VARENICLINE TARTRATE 0.5 MG/1
.5-1 TABLET, FILM COATED ORAL SEE ADMIN INSTRUCTIONS
Qty: 57 TABLET | Refills: 0 | Status: SHIPPED | OUTPATIENT
Start: 2023-05-17

## 2023-05-17 RX ORDER — METFORMIN HYDROCHLORIDE 500 MG/1
500 TABLET, EXTENDED RELEASE ORAL EVERY 12 HOURS
Qty: 60 TABLET | Refills: 5 | Status: SHIPPED | OUTPATIENT
Start: 2023-05-17

## 2023-05-17 RX ORDER — ONDANSETRON 4 MG/1
4 TABLET, ORALLY DISINTEGRATING ORAL EVERY 8 HOURS PRN
Qty: 30 TABLET | Refills: 2 | Status: SHIPPED | OUTPATIENT
Start: 2023-05-17

## 2023-05-17 SDOH — ECONOMIC STABILITY: HOUSING INSECURITY
IN THE LAST 12 MONTHS, WAS THERE A TIME WHEN YOU DID NOT HAVE A STEADY PLACE TO SLEEP OR SLEPT IN A SHELTER (INCLUDING NOW)?: NO

## 2023-05-17 SDOH — ECONOMIC STABILITY: INCOME INSECURITY: HOW HARD IS IT FOR YOU TO PAY FOR THE VERY BASICS LIKE FOOD, HOUSING, MEDICAL CARE, AND HEATING?: NOT HARD AT ALL

## 2023-05-17 SDOH — ECONOMIC STABILITY: FOOD INSECURITY: WITHIN THE PAST 12 MONTHS, THE FOOD YOU BOUGHT JUST DIDN'T LAST AND YOU DIDN'T HAVE MONEY TO GET MORE.: NEVER TRUE

## 2023-05-17 SDOH — ECONOMIC STABILITY: FOOD INSECURITY: WITHIN THE PAST 12 MONTHS, YOU WORRIED THAT YOUR FOOD WOULD RUN OUT BEFORE YOU GOT MONEY TO BUY MORE.: NEVER TRUE

## 2023-05-17 ASSESSMENT — ENCOUNTER SYMPTOMS
COUGH: 0
NAUSEA: 1
SHORTNESS OF BREATH: 0
VOMITING: 1

## 2023-05-17 NOTE — PROGRESS NOTES
1. \"Have you been to the ER, urgent care clinic since your last visit? Hospitalized since your last visit? \" No    2. \"Have you seen or consulted any other health care providers outside of the [address] [place] [address] since your last visit? \" No     3. For patients aged 39-70: Has the patient had a colonoscopy / FIT/ Cologuard? N/A      If the patient is female:    4. For patients aged 41-77: Has the patient had a mammogram within the past 2 years? NA      5. For patients aged 21-65: Has the patient had a pap smear?  Yes    Health Maintenance Due   Topic Date Due    Varicella vaccine (1 of 2 - 2-dose childhood series) Never done    Pneumococcal 0-64 years Vaccine (1 - PCV) Never done    Hepatitis C screen  Never done    Hepatitis B vaccine (1 of 3 - Risk 3-dose series) Never done    COVID-19 Vaccine (2 - Moderna series) 09/24/2021
that this dictation was completed with [employer], the computer voice recognition software. Quite often unanticipated grammatical, syntax, homophones, and other interpretive errors are inadvertently transcribed by the computer software. Please disregard these errors. Please excuse any errors that have escaped final proofreading. Thank you.      MD ZEENAT Murphy & ALCIDES MIRAMONTES [facility]  05/17/23

## 2023-05-17 NOTE — PATIENT INSTRUCTIONS
Patient Education        Learning About Benefits From Quitting Smoking  Why is it important to quit smoking? If you're thinking about quitting smoking, you may have a few reasons to be smoke-free. Your health may be one of them. When you quit smoking, you lower your risk for many serious health problems, such as cancer, lung disease, heart attack, stroke, blood vessel disease, and blindness from macular degeneration. When you're smoke-free, you get sick less often, and you heal faster. You are less likely to get colds, flu, bronchitis, and pneumonia. As a nonsmoker, you may find that your mood is better and you are less stressed. When and how will you feel healthier? Quitting has real health benefits that start from day 1 of being smoke-free. And the longer you stay smoke-free, the healthier you get and the better you feel. The first hours or days  Soon after you stop smoking, your blood pressure and heart rate go down. That means there's less stress on your heart and blood vessels. Within days, the level of carbon monoxide in your blood drops back to normal. That makes room for more oxygen. Within weeks or months  When your lungs begin to clear, you cough less and breathe deeper, so it may be easier to be active. Your sense of taste and smell should return. That means you may enjoy food more than you have since you started smoking. Over the years  Over the years, your risks of heart disease, heart attack, and stroke are lower. After 10 years, your risk of lung cancer is cut by about half. And your risk for many other types of cancer is lower too. How would quitting help others in your life? When you quit smoking, you improve the health of everyone who now breathes in your smoke. Their heart, lung, and cancer risks may drop, much like yours. They are sick less. For babies and small children, living smoke-free means they're less likely to have ear infections, pneumonia, and bronchitis.   If you are

## 2023-07-10 RX ORDER — VARENICLINE TARTRATE 1 MG/1
TABLET, FILM COATED ORAL
Qty: 56 TABLET | Refills: 0 | Status: SHIPPED | OUTPATIENT
Start: 2023-07-10

## 2023-07-17 RX ORDER — BUSPIRONE HYDROCHLORIDE 10 MG/1
TABLET ORAL
Qty: 60 TABLET | Refills: 5 | Status: SHIPPED | OUTPATIENT
Start: 2023-07-17

## 2023-07-22 DIAGNOSIS — R11.2 NAUSEA AND VOMITING, UNSPECIFIED VOMITING TYPE: ICD-10-CM

## 2023-07-24 RX ORDER — ONDANSETRON 4 MG/1
TABLET, ORALLY DISINTEGRATING ORAL
Qty: 30 TABLET | Refills: 2 | Status: SHIPPED | OUTPATIENT
Start: 2023-07-24

## 2023-08-10 RX ORDER — VARENICLINE TARTRATE 1 MG/1
TABLET, FILM COATED ORAL
Qty: 56 TABLET | Refills: 0 | Status: SHIPPED | OUTPATIENT
Start: 2023-08-10

## 2023-09-30 DIAGNOSIS — R11.2 NAUSEA AND VOMITING, UNSPECIFIED VOMITING TYPE: ICD-10-CM

## 2023-10-03 RX ORDER — ONDANSETRON 4 MG/1
TABLET, ORALLY DISINTEGRATING ORAL
Qty: 30 TABLET | Refills: 2 | Status: SHIPPED | OUTPATIENT
Start: 2023-10-03

## 2023-10-03 NOTE — TELEPHONE ENCOUNTER
Patient called, no answer. Message left for return call. Patient due for OV. Please schedule upon return call.

## 2023-11-12 DIAGNOSIS — I10 ESSENTIAL (PRIMARY) HYPERTENSION: ICD-10-CM

## 2023-11-13 RX ORDER — AMLODIPINE BESYLATE 5 MG/1
5 TABLET ORAL EVERY MORNING
Qty: 30 TABLET | Refills: 0 | Status: SHIPPED | OUTPATIENT
Start: 2023-11-13

## 2023-11-29 ENCOUNTER — OFFICE VISIT (OUTPATIENT)
Facility: CLINIC | Age: 38
End: 2023-11-29
Payer: MEDICAID

## 2023-11-29 VITALS
BODY MASS INDEX: 37.92 KG/M2 | HEART RATE: 104 BPM | RESPIRATION RATE: 18 BRPM | SYSTOLIC BLOOD PRESSURE: 138 MMHG | WEIGHT: 227.6 LBS | DIASTOLIC BLOOD PRESSURE: 90 MMHG | OXYGEN SATURATION: 98 % | TEMPERATURE: 98 F | HEIGHT: 65 IN

## 2023-11-29 DIAGNOSIS — F41.9 ANXIETY: ICD-10-CM

## 2023-11-29 DIAGNOSIS — G47.00 INSOMNIA, UNSPECIFIED TYPE: ICD-10-CM

## 2023-11-29 DIAGNOSIS — R10.30 LOWER ABDOMINAL PAIN: ICD-10-CM

## 2023-11-29 DIAGNOSIS — Z79.899 LONG TERM USE OF DRUG: ICD-10-CM

## 2023-11-29 DIAGNOSIS — I10 BENIGN HYPERTENSION: ICD-10-CM

## 2023-11-29 DIAGNOSIS — I10 ESSENTIAL (PRIMARY) HYPERTENSION: Primary | ICD-10-CM

## 2023-11-29 DIAGNOSIS — E11.9 CONTROLLED TYPE 2 DIABETES MELLITUS WITHOUT COMPLICATION, WITHOUT LONG-TERM CURRENT USE OF INSULIN (HCC): ICD-10-CM

## 2023-11-29 PROBLEM — R11.15 CYCLIC VOMITING SYNDROME: Status: ACTIVE | Noted: 2022-11-28

## 2023-11-29 PROBLEM — N20.1 URETEROLITHIASIS: Status: ACTIVE | Noted: 2022-08-12

## 2023-11-29 LAB
BILIRUBIN, URINE, POC: NEGATIVE
BLOOD URINE, POC: NORMAL
GLUCOSE URINE, POC: NORMAL
HBA1C MFR BLD: 9.4 %
KETONES, URINE, POC: NEGATIVE
LEUKOCYTE ESTERASE, URINE, POC: NEGATIVE
NITRITE, URINE, POC: NEGATIVE
PH, URINE, POC: 6 (ref 4.6–8)
PROTEIN,URINE, POC: NORMAL
SPECIFIC GRAVITY, URINE, POC: 1.01 (ref 1–1.03)
URINALYSIS CLARITY, POC: CLEAR
URINALYSIS COLOR, POC: YELLOW
UROBILINOGEN, POC: NORMAL

## 2023-11-29 PROCEDURE — 3075F SYST BP GE 130 - 139MM HG: CPT | Performed by: FAMILY MEDICINE

## 2023-11-29 PROCEDURE — 81003 URINALYSIS AUTO W/O SCOPE: CPT | Performed by: FAMILY MEDICINE

## 2023-11-29 PROCEDURE — 3080F DIAST BP >= 90 MM HG: CPT | Performed by: FAMILY MEDICINE

## 2023-11-29 PROCEDURE — 99214 OFFICE O/P EST MOD 30 MIN: CPT | Performed by: FAMILY MEDICINE

## 2023-11-29 PROCEDURE — 83036 HEMOGLOBIN GLYCOSYLATED A1C: CPT | Performed by: FAMILY MEDICINE

## 2023-11-29 RX ORDER — BUSPIRONE HYDROCHLORIDE 10 MG/1
10 TABLET ORAL 3 TIMES DAILY
Qty: 90 TABLET | Refills: 5 | Status: SHIPPED | OUTPATIENT
Start: 2023-11-29

## 2023-11-29 RX ORDER — METFORMIN HYDROCHLORIDE 500 MG/1
1000 TABLET, EXTENDED RELEASE ORAL EVERY 12 HOURS
Qty: 120 TABLET | Refills: 5 | Status: SHIPPED | OUTPATIENT
Start: 2023-11-29

## 2023-11-29 RX ORDER — HYDROXYZINE HYDROCHLORIDE 25 MG/1
25 TABLET, FILM COATED ORAL NIGHTLY
Qty: 30 TABLET | Refills: 0 | Status: SHIPPED | OUTPATIENT
Start: 2023-11-29 | End: 2023-12-29

## 2023-11-29 ASSESSMENT — PATIENT HEALTH QUESTIONNAIRE - PHQ9
SUM OF ALL RESPONSES TO PHQ QUESTIONS 1-9: 22
5. POOR APPETITE OR OVEREATING: 3
SUM OF ALL RESPONSES TO PHQ QUESTIONS 1-9: 22
6. FEELING BAD ABOUT YOURSELF - OR THAT YOU ARE A FAILURE OR HAVE LET YOURSELF OR YOUR FAMILY DOWN: 3
8. MOVING OR SPEAKING SO SLOWLY THAT OTHER PEOPLE COULD HAVE NOTICED. OR THE OPPOSITE, BEING SO FIGETY OR RESTLESS THAT YOU HAVE BEEN MOVING AROUND A LOT MORE THAN USUAL: 1
10. IF YOU CHECKED OFF ANY PROBLEMS, HOW DIFFICULT HAVE THESE PROBLEMS MADE IT FOR YOU TO DO YOUR WORK, TAKE CARE OF THINGS AT HOME, OR GET ALONG WITH OTHER PEOPLE: 2
2. FEELING DOWN, DEPRESSED OR HOPELESS: 3
SUM OF ALL RESPONSES TO PHQ9 QUESTIONS 1 & 2: 5
SUM OF ALL RESPONSES TO PHQ QUESTIONS 1-9: 22
9. THOUGHTS THAT YOU WOULD BE BETTER OFF DEAD, OR OF HURTING YOURSELF: 1
4. FEELING TIRED OR HAVING LITTLE ENERGY: 3
3. TROUBLE FALLING OR STAYING ASLEEP: 3
7. TROUBLE CONCENTRATING ON THINGS, SUCH AS READING THE NEWSPAPER OR WATCHING TELEVISION: 3
SUM OF ALL RESPONSES TO PHQ QUESTIONS 1-9: 21
1. LITTLE INTEREST OR PLEASURE IN DOING THINGS: 2

## 2023-11-29 ASSESSMENT — COLUMBIA-SUICIDE SEVERITY RATING SCALE - C-SSRS
4. HAVE YOU HAD THESE THOUGHTS AND HAD SOME INTENTION OF ACTING ON THEM?: NO
6. HAVE YOU EVER DONE ANYTHING, STARTED TO DO ANYTHING, OR PREPARED TO DO ANYTHING TO END YOUR LIFE?: NO
BASED ON RESPONSES TO C-SSRS QS 1-6, WHAT IS THE PATIENT'S OVERALL RISK RATING FOR SUICIDE: NO RISK
2. HAVE YOU ACTUALLY HAD ANY THOUGHTS OF KILLING YOURSELF?: NO
3. HAVE YOU BEEN THINKING ABOUT HOW YOU MIGHT KILL YOURSELF?: NO
1. WITHIN THE PAST MONTH, HAVE YOU WISHED YOU WERE DEAD OR WISHED YOU COULD GO TO SLEEP AND NOT WAKE UP?: NO
5. HAVE YOU STARTED TO WORK OUT OR WORKED OUT THE DETAILS OF HOW TO KILL YOURSELF? DO YOU INTEND TO CARRY OUT THIS PLAN?: NO
7. DID THIS OCCUR IN THE LAST THREE MONTHS: NO

## 2023-11-29 ASSESSMENT — ANXIETY QUESTIONNAIRES
1. FEELING NERVOUS, ANXIOUS, OR ON EDGE: 3
GAD7 TOTAL SCORE: 21
7. FEELING AFRAID AS IF SOMETHING AWFUL MIGHT HAPPEN: 3
6. BECOMING EASILY ANNOYED OR IRRITABLE: 3
2. NOT BEING ABLE TO STOP OR CONTROL WORRYING: 3
5. BEING SO RESTLESS THAT IT IS HARD TO SIT STILL: 3
4. TROUBLE RELAXING: 3
3. WORRYING TOO MUCH ABOUT DIFFERENT THINGS: 3
IF YOU CHECKED OFF ANY PROBLEMS ON THIS QUESTIONNAIRE, HOW DIFFICULT HAVE THESE PROBLEMS MADE IT FOR YOU TO DO YOUR WORK, TAKE CARE OF THINGS AT HOME, OR GET ALONG WITH OTHER PEOPLE: EXTREMELY DIFFICULT

## 2023-11-30 DIAGNOSIS — E11.9 CONTROLLED TYPE 2 DIABETES MELLITUS WITHOUT COMPLICATION, WITHOUT LONG-TERM CURRENT USE OF INSULIN (HCC): ICD-10-CM

## 2023-11-30 LAB
ALBUMIN SERPL-MCNC: 3.8 G/DL (ref 3.5–5)
ALBUMIN/GLOB SERPL: 1.1 (ref 1.1–2.2)
ALP SERPL-CCNC: 126 U/L (ref 45–117)
ALT SERPL-CCNC: 36 U/L (ref 12–78)
ANION GAP SERPL CALC-SCNC: 8 MMOL/L (ref 5–15)
AST SERPL-CCNC: 17 U/L (ref 15–37)
BASOPHILS # BLD: 0.1 K/UL (ref 0–0.1)
BASOPHILS NFR BLD: 1 % (ref 0–1)
BILIRUB SERPL-MCNC: 0.2 MG/DL (ref 0.2–1)
BUN SERPL-MCNC: 9 MG/DL (ref 6–20)
BUN/CREAT SERPL: 11 (ref 12–20)
CALCIUM SERPL-MCNC: 9.3 MG/DL (ref 8.5–10.1)
CHLORIDE SERPL-SCNC: 106 MMOL/L (ref 97–108)
CO2 SERPL-SCNC: 23 MMOL/L (ref 21–32)
CREAT SERPL-MCNC: 0.84 MG/DL (ref 0.55–1.02)
DIFFERENTIAL METHOD BLD: ABNORMAL
EOSINOPHIL # BLD: 0.4 K/UL (ref 0–0.4)
EOSINOPHIL NFR BLD: 3 % (ref 0–7)
ERYTHROCYTE [DISTWIDTH] IN BLOOD BY AUTOMATED COUNT: 13.4 % (ref 11.5–14.5)
GLOBULIN SER CALC-MCNC: 3.4 G/DL (ref 2–4)
GLUCOSE SERPL-MCNC: 312 MG/DL (ref 65–100)
HCT VFR BLD AUTO: 43.9 % (ref 35–47)
HGB BLD-MCNC: 14.5 G/DL (ref 11.5–16)
IMM GRANULOCYTES # BLD AUTO: 0.1 K/UL (ref 0–0.04)
IMM GRANULOCYTES NFR BLD AUTO: 0 % (ref 0–0.5)
LYMPHOCYTES # BLD: 3.5 K/UL (ref 0.8–3.5)
LYMPHOCYTES NFR BLD: 23 % (ref 12–49)
MCH RBC QN AUTO: 30.6 PG (ref 26–34)
MCHC RBC AUTO-ENTMCNC: 33 G/DL (ref 30–36.5)
MCV RBC AUTO: 92.6 FL (ref 80–99)
MONOCYTES # BLD: 0.6 K/UL (ref 0–1)
MONOCYTES NFR BLD: 4 % (ref 5–13)
NEUTS SEG # BLD: 10.7 K/UL (ref 1.8–8)
NEUTS SEG NFR BLD: 69 % (ref 32–75)
NRBC # BLD: 0 K/UL (ref 0–0.01)
NRBC BLD-RTO: 0 PER 100 WBC
PLATELET # BLD AUTO: 421 K/UL (ref 150–400)
PMV BLD AUTO: 10.1 FL (ref 8.9–12.9)
POTASSIUM SERPL-SCNC: 3.8 MMOL/L (ref 3.5–5.1)
PROT SERPL-MCNC: 7.2 G/DL (ref 6.4–8.2)
RBC # BLD AUTO: 4.74 M/UL (ref 3.8–5.2)
SODIUM SERPL-SCNC: 137 MMOL/L (ref 136–145)
WBC # BLD AUTO: 15.3 K/UL (ref 3.6–11)

## 2023-11-30 RX ORDER — METFORMIN HYDROCHLORIDE 500 MG/1
TABLET, EXTENDED RELEASE ORAL
Qty: 60 TABLET | Refills: 5 | OUTPATIENT
Start: 2023-11-30

## 2023-12-10 DIAGNOSIS — F32.0 MAJOR DEPRESSIVE DISORDER, SINGLE EPISODE, MILD (HCC): ICD-10-CM

## 2023-12-11 RX ORDER — DULOXETIN HYDROCHLORIDE 60 MG/1
60 CAPSULE, DELAYED RELEASE ORAL EVERY MORNING
Qty: 90 CAPSULE | Refills: 1 | Status: SHIPPED | OUTPATIENT
Start: 2023-12-11

## 2023-12-27 DIAGNOSIS — I10 ESSENTIAL (PRIMARY) HYPERTENSION: ICD-10-CM

## 2023-12-27 RX ORDER — AMLODIPINE BESYLATE 5 MG/1
5 TABLET ORAL EVERY MORNING
Qty: 30 TABLET | Refills: 0 | Status: SHIPPED | OUTPATIENT
Start: 2023-12-27

## 2024-01-08 ENCOUNTER — TELEPHONE (OUTPATIENT)
Facility: CLINIC | Age: 39
End: 2024-01-08

## 2024-01-08 ENCOUNTER — OFFICE VISIT (OUTPATIENT)
Facility: CLINIC | Age: 39
End: 2024-01-08
Payer: MEDICAID

## 2024-01-08 VITALS
HEIGHT: 65 IN | SYSTOLIC BLOOD PRESSURE: 136 MMHG | BODY MASS INDEX: 36.59 KG/M2 | WEIGHT: 219.6 LBS | HEART RATE: 96 BPM | OXYGEN SATURATION: 99 % | DIASTOLIC BLOOD PRESSURE: 91 MMHG | RESPIRATION RATE: 18 BRPM | TEMPERATURE: 98.3 F

## 2024-01-08 DIAGNOSIS — M54.50 ACUTE LEFT-SIDED LOW BACK PAIN WITHOUT SCIATICA: Primary | ICD-10-CM

## 2024-01-08 DIAGNOSIS — R10.30 LOWER ABDOMINAL PAIN, UNSPECIFIED: ICD-10-CM

## 2024-01-08 DIAGNOSIS — I10 BENIGN HYPERTENSION: ICD-10-CM

## 2024-01-08 LAB
BILIRUBIN, URINE, POC: NORMAL
BLOOD URINE, POC: NORMAL
GLUCOSE URINE, POC: NORMAL
KETONES, URINE, POC: NORMAL
LEUKOCYTE ESTERASE, URINE, POC: NEGATIVE
NITRITE, URINE, POC: NEGATIVE
PH, URINE, POC: 6 (ref 4.6–8)
PROTEIN,URINE, POC: NORMAL
SPECIFIC GRAVITY, URINE, POC: 1.03 (ref 1–1.03)
URINALYSIS CLARITY, POC: CLEAR
URINALYSIS COLOR, POC: NORMAL
UROBILINOGEN, POC: NORMAL

## 2024-01-08 PROCEDURE — 3080F DIAST BP >= 90 MM HG: CPT | Performed by: FAMILY MEDICINE

## 2024-01-08 PROCEDURE — 3075F SYST BP GE 130 - 139MM HG: CPT | Performed by: FAMILY MEDICINE

## 2024-01-08 PROCEDURE — 81003 URINALYSIS AUTO W/O SCOPE: CPT | Performed by: FAMILY MEDICINE

## 2024-01-08 PROCEDURE — 99214 OFFICE O/P EST MOD 30 MIN: CPT | Performed by: FAMILY MEDICINE

## 2024-01-08 RX ORDER — METHOCARBAMOL 750 MG/1
TABLET, FILM COATED ORAL
COMMUNITY
Start: 2024-01-05

## 2024-01-08 RX ORDER — MELOXICAM 7.5 MG/1
7.5 TABLET ORAL DAILY PRN
Qty: 30 TABLET | Refills: 1 | Status: SHIPPED | OUTPATIENT
Start: 2024-01-08

## 2024-01-08 RX ORDER — TIZANIDINE 4 MG/1
4 TABLET ORAL 3 TIMES DAILY PRN
Qty: 30 TABLET | Refills: 0 | Status: SHIPPED | OUTPATIENT
Start: 2024-01-08

## 2024-01-08 NOTE — TELEPHONE ENCOUNTER
Pt would like to know if it's ok for her to take her busPIRone (BUSPAR) 10 MG tablet as follows:    2 pills in the morning and 1 pill in the evening    - pt states she is busy during the day and it's hard for her to remember to take a pill in the afternoon.    Please advise..

## 2024-01-08 NOTE — PROGRESS NOTES
Chief Complaint   Patient presents with    Follow-up     F/u from ED from [facility]. Back pain       Subjective:      Kayla Mann is an 38 y.o. female who presents for evaluation and treatment of L sided back pain. 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 Started in the suprapubic area and radiated to L side. No numbness, weakness. No trauma.   Went to ER and had CT done --no kidney stone. Given robaxin but no improvement.  Has DDD and fibromyalgia  FSBS 200- 300.     Hemoglobin A1C   Date Value Ref Range Status   08/18/2022 6.5 (H) 4.0 - 5.6 % Final     Comment:     NEW METHOD  PLEASE NOTE NEW REFERENCE RANGE  (NOTE)  HbA1C Interpretive Ranges  <5.7              Normal  5.7 - 6.4         Consider Prediabetes  >6.5              Consider Diabetes       Hemoglobin A1C, POC   Date Value Ref Range Status   11/29/2023 9.4 % Final       Past Medical History:   Diagnosis Date    Anemia     receiving IV iron last dose in 5/2020    Arthritis     RA    Asthma     inhaler PRN    Depression 02/22/2021    Epilepsy ([facility])     2014    Essential hypertension     Gastroparesis     Generalized convulsive epilepsy (HCC) 08/01/2022    Gestational diabetes     Heart abnormality     mild heart murmur no medications required    Herpes simplex virus (HSV) infection     Ill-defined condition     fibromyalgia    Infertility, female     Liver disease     fatty liver    Low back pain     Obstructive sleep apnea 08/01/2022    Polycystic disease, ovaries     Posttraumatic stress disorder 08/01/2022    Psychiatric problem     depression, PTSD     Family History   Problem Relation Age of Onset    Diabetes Paternal Uncle     Alcohol Abuse Paternal Grandfather     Stroke Paternal Grandmother     Cancer Paternal Grandmother     Hypertension Mother      Current Outpatient Medications   Medication Sig Dispense Refill    methocarbamol (ROBAXIN) 750 MG tablet TAKE ONE TABLET BY MOUTH THREE TIMES DAILY AS NEEDED FOR PAIN      tiZANidine (ZANAFLEX) 4 MG tablet

## 2024-01-08 NOTE — TELEPHONE ENCOUNTER
Advised patient per Dr. Burleson she is able to take the buspar 2 tabs in the AM and 1 in the evening. She verbalized understanding.

## 2024-01-08 NOTE — PROGRESS NOTES
Chief Complaint   Patient presents with    Follow-up     F/u from ED from [facility]. Back pain       \"Have you been to the ER, urgent care clinic since your last visit?  Hospitalized since your last visit?\"    YES; [facility]  “Have you seen or consulted any other health care providers outside of [facility] since your last visit?”    NO              Health Maintenance Due   Topic Date Due    Hepatitis B vaccine (1 of 3 - 3-dose series) Never done    Varicella vaccine (1 of 2 - 2-dose childhood series) Never done    Pneumococcal 0-64 years Vaccine (1 - PCV) Never done    Hepatitis C screen  Never done    Lipids  08/01/2023    Flu vaccine (1) 08/01/2023    COVID-19 Vaccine (2 - 2023-24 season) 09/01/2023    Diabetic foot exam  09/07/2023    Diabetic retinal exam  09/16/2023    Diabetic Alb to Cr ratio (uACR) test  12/07/2023

## 2024-01-19 NOTE — TELEPHONE ENCOUNTER
Faxed received from pharmacy, requesting Dr. Burleson to fill omeprazole. Previously prescribed by DENNIS Dodson in [place].   
Regular rate and rhythm, Heart sounds S1 S2 present, no murmurs, rubs or gallops

## 2024-01-23 RX ORDER — OMEPRAZOLE 20 MG/1
20 CAPSULE, DELAYED RELEASE ORAL 2 TIMES DAILY
Qty: 180 CAPSULE | Refills: 1 | Status: SHIPPED | OUTPATIENT
Start: 2024-01-23

## 2024-02-01 DIAGNOSIS — I10 ESSENTIAL (PRIMARY) HYPERTENSION: ICD-10-CM

## 2024-02-02 RX ORDER — AMLODIPINE BESYLATE 5 MG/1
5 TABLET ORAL EVERY MORNING
Qty: 30 TABLET | Refills: 5 | Status: SHIPPED | OUTPATIENT
Start: 2024-02-02

## 2024-02-07 RX ORDER — VARENICLINE TARTRATE 1 MG/1
TABLET, FILM COATED ORAL
Qty: 56 TABLET | Refills: 0 | Status: SHIPPED | OUTPATIENT
Start: 2024-02-07

## 2024-02-15 DIAGNOSIS — R11.2 NAUSEA AND VOMITING, UNSPECIFIED VOMITING TYPE: ICD-10-CM

## 2024-02-15 RX ORDER — ONDANSETRON 4 MG/1
TABLET, ORALLY DISINTEGRATING ORAL
Qty: 20 TABLET | Refills: 1 | Status: SHIPPED | OUTPATIENT
Start: 2024-02-15

## 2024-02-29 ENCOUNTER — OFFICE VISIT (OUTPATIENT)
Facility: CLINIC | Age: 39
End: 2024-02-29
Payer: MEDICAID

## 2024-02-29 VITALS
BODY MASS INDEX: 36.49 KG/M2 | HEIGHT: 65 IN | WEIGHT: 219 LBS | DIASTOLIC BLOOD PRESSURE: 96 MMHG | TEMPERATURE: 98.9 F | SYSTOLIC BLOOD PRESSURE: 160 MMHG | RESPIRATION RATE: 20 BRPM | HEART RATE: 102 BPM | OXYGEN SATURATION: 97 %

## 2024-02-29 DIAGNOSIS — J01.90 ACUTE BACTERIAL SINUSITIS: Primary | ICD-10-CM

## 2024-02-29 DIAGNOSIS — B96.89 ACUTE BACTERIAL SINUSITIS: Primary | ICD-10-CM

## 2024-02-29 PROCEDURE — 3080F DIAST BP >= 90 MM HG: CPT | Performed by: FAMILY MEDICINE

## 2024-02-29 PROCEDURE — 99214 OFFICE O/P EST MOD 30 MIN: CPT | Performed by: FAMILY MEDICINE

## 2024-02-29 PROCEDURE — 3077F SYST BP >= 140 MM HG: CPT | Performed by: FAMILY MEDICINE

## 2024-02-29 RX ORDER — CYCLOBENZAPRINE HCL 5 MG
TABLET ORAL
COMMUNITY
Start: 2024-02-06

## 2024-02-29 RX ORDER — AZITHROMYCIN 250 MG/1
TABLET, FILM COATED ORAL
Qty: 6 TABLET | Refills: 0 | Status: SHIPPED | OUTPATIENT
Start: 2024-02-29 | End: 2024-03-10

## 2024-02-29 ASSESSMENT — ENCOUNTER SYMPTOMS
SINUS PRESSURE: 1
RHINORRHEA: 1
COUGH: 1

## 2024-02-29 NOTE — PROGRESS NOTES
\"Have you been to the ER, urgent care clinic since your last visit?  Hospitalized since your last visit?\"    no    “Have you seen or consulted any other health care providers outside of [facility] since your last visit?”    no           
Smokeless tobacco: Never   Substance and Sexual Activity    Alcohol use: Never    Drug use: Yes     Frequency: 7.0 times per week     Types: Marijuana (Weed)     Social Determinants of Health     Financial Resource Strain: Low Risk  (5/17/2023)    Overall Financial Resource Strain (CARDIA)     Difficulty of Paying Living Expenses: Not hard at all   Transportation Needs: Unknown (5/17/2023)    PRAPARE - Transportation     Lack of Transportation (Non-Medical): No   Housing Stability: Unknown (5/17/2023)    Housing Stability Vital Sign     Unstable Housing in the Last Year: No        Review of Systems   Review of Systems   Constitutional:  Negative for chills, fatigue and fever.   HENT:  Positive for rhinorrhea and sinus pressure. Negative for ear discharge and ear pain.    Respiratory:  Positive for cough.    Cardiovascular:  Negative for chest pain.       Objective:   BP (!) 160/96 (Site: Left Upper Arm, Position: Sitting)   Pulse (!) 102   Temp 98.9 °F (37.2 °C) (Oral)   Resp 20   Ht 1.651 m (5' 5\")   Wt 99.3 kg (219 lb)   LMP 02/19/2024 (Approximate)   SpO2 97%   BMI 36.44 kg/m²      Physical Exam  Vitals and nursing note reviewed.   Constitutional:       Appearance: Normal appearance.   HENT:      Head: Normocephalic and atraumatic.      Nose: Congestion and rhinorrhea present.      Right Turbinates: Enlarged and swollen.      Left Turbinates: Enlarged and swollen.      Mouth/Throat:      Pharynx: Posterior oropharyngeal erythema present.   Cardiovascular:      Rate and Rhythm: Normal rate and regular rhythm.      Pulses: Normal pulses.      Heart sounds: Normal heart sounds. No murmur heard.     No friction rub. No gallop.   Pulmonary:      Effort: Pulmonary effort is normal.      Breath sounds: Normal breath sounds.   Abdominal:      General: Abdomen is flat. Bowel sounds are normal.      Palpations: Abdomen is soft.   Musculoskeletal:      Cervical back: Normal range of motion and neck supple.   Skin:

## 2024-03-07 ENCOUNTER — OFFICE VISIT (OUTPATIENT)
Facility: CLINIC | Age: 39
End: 2024-03-07
Payer: MEDICAID

## 2024-03-07 VITALS
RESPIRATION RATE: 18 BRPM | DIASTOLIC BLOOD PRESSURE: 86 MMHG | SYSTOLIC BLOOD PRESSURE: 134 MMHG | WEIGHT: 214 LBS | TEMPERATURE: 97.9 F | HEIGHT: 65 IN | HEART RATE: 110 BPM | BODY MASS INDEX: 35.65 KG/M2 | OXYGEN SATURATION: 97 %

## 2024-03-07 DIAGNOSIS — E78.2 MIXED HYPERLIPIDEMIA: ICD-10-CM

## 2024-03-07 DIAGNOSIS — Z79.899 LONG TERM USE OF DRUG: ICD-10-CM

## 2024-03-07 DIAGNOSIS — F33.1 MODERATE EPISODE OF RECURRENT MAJOR DEPRESSIVE DISORDER (HCC): ICD-10-CM

## 2024-03-07 DIAGNOSIS — I10 ESSENTIAL (PRIMARY) HYPERTENSION: ICD-10-CM

## 2024-03-07 DIAGNOSIS — Z72.0 TOBACCO ABUSE: ICD-10-CM

## 2024-03-07 DIAGNOSIS — E11.65 CONTROLLED TYPE 2 DIABETES MELLITUS WITH HYPERGLYCEMIA, WITHOUT LONG-TERM CURRENT USE OF INSULIN (HCC): Primary | ICD-10-CM

## 2024-03-07 DIAGNOSIS — I10 BENIGN HYPERTENSION: ICD-10-CM

## 2024-03-07 LAB — HBA1C MFR BLD: 8.6 %

## 2024-03-07 PROCEDURE — 83036 HEMOGLOBIN GLYCOSYLATED A1C: CPT | Performed by: FAMILY MEDICINE

## 2024-03-07 PROCEDURE — 99214 OFFICE O/P EST MOD 30 MIN: CPT | Performed by: FAMILY MEDICINE

## 2024-03-07 PROCEDURE — 3079F DIAST BP 80-89 MM HG: CPT | Performed by: FAMILY MEDICINE

## 2024-03-07 PROCEDURE — 3075F SYST BP GE 130 - 139MM HG: CPT | Performed by: FAMILY MEDICINE

## 2024-03-07 RX ORDER — VARENICLINE TARTRATE 1 MG/1
TABLET, FILM COATED ORAL
Qty: 56 TABLET | Refills: 0 | Status: SHIPPED | OUTPATIENT
Start: 2024-03-07

## 2024-03-07 RX ORDER — AMLODIPINE BESYLATE 10 MG/1
10 TABLET ORAL EVERY MORNING
Qty: 90 TABLET | Refills: 1 | Status: SHIPPED | OUTPATIENT
Start: 2024-03-07

## 2024-03-07 ASSESSMENT — ANXIETY QUESTIONNAIRES
7. FEELING AFRAID AS IF SOMETHING AWFUL MIGHT HAPPEN: 3
GAD7 TOTAL SCORE: 19
5. BEING SO RESTLESS THAT IT IS HARD TO SIT STILL: 2
4. TROUBLE RELAXING: 3
1. FEELING NERVOUS, ANXIOUS, OR ON EDGE: 3
3. WORRYING TOO MUCH ABOUT DIFFERENT THINGS: 3
IF YOU CHECKED OFF ANY PROBLEMS ON THIS QUESTIONNAIRE, HOW DIFFICULT HAVE THESE PROBLEMS MADE IT FOR YOU TO DO YOUR WORK, TAKE CARE OF THINGS AT HOME, OR GET ALONG WITH OTHER PEOPLE: SOMEWHAT DIFFICULT
2. NOT BEING ABLE TO STOP OR CONTROL WORRYING: 2
6. BECOMING EASILY ANNOYED OR IRRITABLE: 3

## 2024-03-07 ASSESSMENT — PATIENT HEALTH QUESTIONNAIRE - PHQ9
7. TROUBLE CONCENTRATING ON THINGS, SUCH AS READING THE NEWSPAPER OR WATCHING TELEVISION: 2
SUM OF ALL RESPONSES TO PHQ QUESTIONS 1-9: 15
1. LITTLE INTEREST OR PLEASURE IN DOING THINGS: 1
SUM OF ALL RESPONSES TO PHQ QUESTIONS 1-9: 15
10. IF YOU CHECKED OFF ANY PROBLEMS, HOW DIFFICULT HAVE THESE PROBLEMS MADE IT FOR YOU TO DO YOUR WORK, TAKE CARE OF THINGS AT HOME, OR GET ALONG WITH OTHER PEOPLE: 1
5. POOR APPETITE OR OVEREATING: 3
9. THOUGHTS THAT YOU WOULD BE BETTER OFF DEAD, OR OF HURTING YOURSELF: 0
3. TROUBLE FALLING OR STAYING ASLEEP: 3
SUM OF ALL RESPONSES TO PHQ9 QUESTIONS 1 & 2: 3
8. MOVING OR SPEAKING SO SLOWLY THAT OTHER PEOPLE COULD HAVE NOTICED. OR THE OPPOSITE, BEING SO FIGETY OR RESTLESS THAT YOU HAVE BEEN MOVING AROUND A LOT MORE THAN USUAL: 0
4. FEELING TIRED OR HAVING LITTLE ENERGY: 1
6. FEELING BAD ABOUT YOURSELF - OR THAT YOU ARE A FAILURE OR HAVE LET YOURSELF OR YOUR FAMILY DOWN: 3
2. FEELING DOWN, DEPRESSED OR HOPELESS: 2

## 2024-03-07 ASSESSMENT — ENCOUNTER SYMPTOMS
COUGH: 0
SHORTNESS OF BREATH: 0

## 2024-03-07 NOTE — PROGRESS NOTES
1. \"Have you been to the ER, urgent care clinic since your last visit?  Hospitalized since your last visit?\" NO    2. \"Have you seen or consulted any other health care providers outside of the [facility] since your last visit?\" no        Health Maintenance Due   Topic Date Due    Hepatitis B vaccine (1 of 3 - 3-dose series) Never done    Varicella vaccine (1 of 2 - 2-dose childhood series) Never done    Pneumococcal 0-64 years Vaccine (1 - PCV) Never done    Hepatitis C screen  Never done    Lipids  08/01/2023    COVID-19 Vaccine (2 - 2023-24 season) 09/01/2023    Diabetic foot exam  09/07/2023    Diabetic retinal exam  09/16/2023    Diabetic Alb to Cr ratio (uACR) test  12/07/2023     
MOUTH THREE TIMES DAILY AS NEEDED FOR PAIN      diclofenac sodium (VOLTAREN) 1 % GEL Apply 4 g topically 4 times daily 100 g 0    DULoxetine (CYMBALTA) 60 MG extended release capsule TAKE ONE CAPSULE BY MOUTH EVERY MORNING 90 capsule 1    busPIRone (BUSPAR) 10 MG tablet Take 1 tablet by mouth 3 times daily Prn anxiety (Patient taking differently: Take 1 tablet by mouth in the morning and at bedtime 2 in the am and 1 in the pm) 90 tablet 5    metFORMIN (GLUCOPHAGE-XR) 500 MG extended release tablet Take 2 tablets by mouth in the morning and 2 tablets in the evening. 120 tablet 5    albuterol sulfate HFA (PROVENTIL;VENTOLIN;PROAIR) 108 (90 Base) MCG/ACT inhaler ProAir HFA 90 mcg/actuation aerosol inhaler      nystatin (MYCOSTATIN) 445884 UNIT/ML suspension As needed       No current facility-administered medications for this visit.     Allergies   Allergen Reactions    Latex Rash    Penicillins Rash and Swelling    Codeine Rash     Reaction Type: Allergy; Severity: Mild    Cyclobenzaprine Other (See Comments)     Reaction Type: Allergy; Severity: Severe; Reaction(s): Seziure  Reaction Type: Allergy    Lactose Nausea And Vomiting and Nausea Only    Sulfa Antibiotics Rash and Swelling    Sulfamethoxazole-Trimethoprim Itching, Rash and Swelling    Trimethoprim Other (See Comments)     Reaction Type: Allergy       Objective:  /86 (Site: Left Upper Arm, Position: Sitting, Cuff Size: Large Adult)   Pulse (!) 110   Temp 97.9 °F (36.6 °C) (Oral)   Resp 18   Ht 1.651 m (5' 5\")   Wt 97.1 kg (214 lb)   LMP 02/19/2024 (Approximate)   SpO2 97%   BMI 35.61 kg/m²   Physical Exam:   PHYSICAL EXAM:  Gen: Pt sitting in chair, in NAD, obese  Head: Normocephalic, atraumatic  Eyes: Sclera anicteric, EOM grossly intact,  CVS: Normal S1, S2, no m/r/g  Resp: CTAB, no wheezes or rales  Extrem: Atraumatic, no cyanosis or edema  Pulses: 2+   Skin: Warm, dry  Neuro: Alert, oriented, appropriate        Diabetic foot exam:     Left

## 2024-03-07 NOTE — PATIENT INSTRUCTIONS
[facility]  [address] [place]  [address]    [phone]              [place], [place]  In person and Telehealth appointments available  Call:  [phone]          Angelique \"Renata\" Harish  P: [phone]

## 2024-03-09 LAB
ALBUMIN SERPL-MCNC: 4 G/DL (ref 3.5–5)
ALP SERPL-CCNC: 121 U/L (ref 45–117)
ALT SERPL-CCNC: 38 U/L (ref 12–78)
ANION GAP SERPL CALC-SCNC: 5 MMOL/L (ref 5–15)
AST SERPL-CCNC: 23 U/L (ref 15–37)
BASOPHILS # BLD: 0.1 K/UL (ref 0–0.1)
BASOPHILS NFR BLD: 1 % (ref 0–1)
BILIRUB SERPL-MCNC: 0.5 MG/DL (ref 0.2–1)
BUN SERPL-MCNC: 7 MG/DL (ref 6–20)
BUN/CREAT SERPL: 8 (ref 12–20)
CALCIUM SERPL-MCNC: 9.3 MG/DL (ref 8.5–10.1)
CHLORIDE SERPL-SCNC: 108 MMOL/L (ref 97–108)
CHOLEST SERPL-MCNC: 228 MG/DL
CO2 SERPL-SCNC: 26 MMOL/L (ref 21–32)
CREAT SERPL-MCNC: 0.84 MG/DL (ref 0.55–1.02)
DIFFERENTIAL METHOD BLD: ABNORMAL
EOSINOPHIL # BLD: 0.4 K/UL (ref 0–0.4)
ERYTHROCYTE [DISTWIDTH] IN BLOOD BY AUTOMATED COUNT: 13.2 % (ref 11.5–14.5)
GLOBULIN SER CALC-MCNC: 3.4 G/DL (ref 2–4)
GLUCOSE SERPL-MCNC: 210 MG/DL (ref 65–100)
HCT VFR BLD AUTO: 49 % (ref 35–47)
HDLC SERPL-MCNC: 37 MG/DL
HDLC SERPL: 6.2 (ref 0–5)
HGB BLD-MCNC: 15.5 G/DL (ref 11.5–16)
IMM GRANULOCYTES NFR BLD AUTO: 0 % (ref 0–0.5)
LYMPHOCYTES # BLD: 3 K/UL (ref 0.8–3.5)
LYMPHOCYTES NFR BLD: 19 % (ref 12–49)
MCH RBC QN AUTO: 30.5 PG (ref 26–34)
MCHC RBC AUTO-ENTMCNC: 31.6 G/DL (ref 30–36.5)
MCV RBC AUTO: 96.5 FL (ref 80–99)
MICROALBUMIN UR-MCNC: 51.2 MG/DL
MICROALBUMIN/CREAT UR-RTO: 261 MG/G (ref 0–30)
MONOCYTES # BLD: 0.6 K/UL (ref 0–1)
MONOCYTES NFR BLD: 4 % (ref 5–13)
NEUTS SEG # BLD: 11.4 K/UL (ref 1.8–8)
NEUTS SEG NFR BLD: 74 % (ref 32–75)
NRBC # BLD: 0 K/UL (ref 0–0.01)
PLATELET # BLD AUTO: 495 K/UL (ref 150–400)
PMV BLD AUTO: 10 FL (ref 8.9–12.9)
POTASSIUM SERPL-SCNC: 3.7 MMOL/L (ref 3.5–5.1)
RBC # BLD AUTO: 5.08 M/UL (ref 3.8–5.2)
SODIUM SERPL-SCNC: 139 MMOL/L (ref 136–145)
TRIGL SERPL-MCNC: 190 MG/DL
VLDLC SERPL CALC-MCNC: 38 MG/DL
WBC # BLD AUTO: 15.5 K/UL (ref 3.6–11)

## 2024-03-30 DIAGNOSIS — R11.2 NAUSEA AND VOMITING, UNSPECIFIED VOMITING TYPE: ICD-10-CM

## 2024-04-02 RX ORDER — ONDANSETRON 4 MG/1
TABLET, ORALLY DISINTEGRATING ORAL
Qty: 20 TABLET | Refills: 1 | Status: SHIPPED | OUTPATIENT
Start: 2024-04-02

## 2024-04-04 ENCOUNTER — TELEPHONE (OUTPATIENT)
Facility: CLINIC | Age: 39
End: 2024-04-04

## 2024-04-04 NOTE — TELEPHONE ENCOUNTER
Called patient. No answer, left message to call back. Appt scheduled for Monday at 1:20. Will send mychart message as well.

## 2024-04-04 NOTE — TELEPHONE ENCOUNTER
Pt was seen in the Ed for rectal bleeding earlier this week. Pt had an CT done and was told they could not see anything, no tears or hemroids were seen. They did not do an internal exam. Pt is now complaining of stomach pains and thinks she has a prolapse. Pt was advised to seek care at an ED for re-evaluation. Pt says she will, but would like a morning appt with Dr Burleson for a follow up. Please advise.

## 2024-04-19 DIAGNOSIS — Z72.0 TOBACCO ABUSE: ICD-10-CM

## 2024-04-19 RX ORDER — VARENICLINE TARTRATE 1 MG/1
TABLET, FILM COATED ORAL
Qty: 56 TABLET | Refills: 0 | Status: SHIPPED | OUTPATIENT
Start: 2024-04-19

## 2024-04-30 DIAGNOSIS — R11.2 NAUSEA AND VOMITING, UNSPECIFIED VOMITING TYPE: ICD-10-CM

## 2024-04-30 RX ORDER — ONDANSETRON 4 MG/1
TABLET, ORALLY DISINTEGRATING ORAL
Qty: 20 TABLET | Refills: 1 | Status: SHIPPED | OUTPATIENT
Start: 2024-04-30

## 2024-05-21 DIAGNOSIS — R11.2 NAUSEA AND VOMITING, UNSPECIFIED VOMITING TYPE: ICD-10-CM

## 2024-05-21 RX ORDER — ONDANSETRON 4 MG/1
TABLET, ORALLY DISINTEGRATING ORAL
Qty: 20 TABLET | Refills: 1 | Status: SHIPPED | OUTPATIENT
Start: 2024-05-21

## 2024-06-12 DIAGNOSIS — E11.9 CONTROLLED TYPE 2 DIABETES MELLITUS WITHOUT COMPLICATION, WITHOUT LONG-TERM CURRENT USE OF INSULIN (HCC): ICD-10-CM

## 2024-06-12 RX ORDER — METFORMIN HYDROCHLORIDE 500 MG/1
TABLET, EXTENDED RELEASE ORAL
Qty: 120 TABLET | Refills: 5 | Status: SHIPPED | OUTPATIENT
Start: 2024-06-12

## 2024-06-20 DIAGNOSIS — R11.2 NAUSEA AND VOMITING, UNSPECIFIED VOMITING TYPE: ICD-10-CM

## 2024-06-20 RX ORDER — ONDANSETRON 4 MG/1
TABLET, ORALLY DISINTEGRATING ORAL
Qty: 20 TABLET | Refills: 1 | Status: SHIPPED | OUTPATIENT
Start: 2024-06-20

## 2024-06-21 DIAGNOSIS — F41.9 ANXIETY: ICD-10-CM

## 2024-06-21 RX ORDER — BUSPIRONE HYDROCHLORIDE 10 MG/1
10 TABLET ORAL 3 TIMES DAILY PRN
Qty: 90 TABLET | Refills: 2 | Status: SHIPPED | OUTPATIENT
Start: 2024-06-21

## 2024-06-30 DIAGNOSIS — F32.0 MAJOR DEPRESSIVE DISORDER, SINGLE EPISODE, MILD (HCC): ICD-10-CM

## 2024-07-01 RX ORDER — DULOXETIN HYDROCHLORIDE 60 MG/1
60 CAPSULE, DELAYED RELEASE ORAL EVERY MORNING
Qty: 90 CAPSULE | Refills: 1 | Status: SHIPPED | OUTPATIENT
Start: 2024-07-01

## 2024-07-15 ENCOUNTER — TELEPHONE (OUTPATIENT)
Facility: CLINIC | Age: 39
End: 2024-07-15

## 2024-07-15 DIAGNOSIS — F51.04 PSYCHOPHYSIOLOGICAL INSOMNIA: ICD-10-CM

## 2024-07-15 DIAGNOSIS — F43.21 GRIEF: Primary | ICD-10-CM

## 2024-07-15 NOTE — TELEPHONE ENCOUNTER
Patient states she is unable to sleep from grief of her grandmother passing. She wants to discuss medication adjustment at her appt with Dr. Burleson but requesting help now.  Advised her that Dr. Burleson is out of the office & she verbalized understanding.

## 2024-07-16 RX ORDER — TRAZODONE HYDROCHLORIDE 50 MG/1
50 TABLET ORAL NIGHTLY PRN
Qty: 30 TABLET | Refills: 1 | Status: SHIPPED | OUTPATIENT
Start: 2024-07-16

## 2024-07-16 NOTE — TELEPHONE ENCOUNTER
Patient has reported that since her Grandmother passed away last week she has had severe issues with trying to sleep.  Patient is willing to try sleep aid, Ambien caused sleep walking in the past.  Will to trial low dose Trazodone.  Has appt with Dr. Burleson on Friday for follow up.    Antony Beckford MD  [facility]  07/16/24

## 2024-07-19 ENCOUNTER — OFFICE VISIT (OUTPATIENT)
Facility: CLINIC | Age: 39
End: 2024-07-19
Payer: MEDICAID

## 2024-07-19 VITALS
HEIGHT: 65 IN | HEART RATE: 104 BPM | WEIGHT: 207 LBS | DIASTOLIC BLOOD PRESSURE: 82 MMHG | RESPIRATION RATE: 20 BRPM | SYSTOLIC BLOOD PRESSURE: 116 MMHG | BODY MASS INDEX: 34.49 KG/M2 | TEMPERATURE: 97.6 F | OXYGEN SATURATION: 99 %

## 2024-07-19 DIAGNOSIS — R11.2 NAUSEA AND VOMITING, UNSPECIFIED VOMITING TYPE: ICD-10-CM

## 2024-07-19 DIAGNOSIS — E11.40 CONTROLLED TYPE 2 DIABETES MELLITUS WITH DIABETIC NEUROPATHY, WITHOUT LONG-TERM CURRENT USE OF INSULIN (HCC): Primary | ICD-10-CM

## 2024-07-19 DIAGNOSIS — Z72.0 TOBACCO ABUSE: ICD-10-CM

## 2024-07-19 DIAGNOSIS — K31.84 GASTROPARESIS: ICD-10-CM

## 2024-07-19 DIAGNOSIS — R30.0 DYSURIA: ICD-10-CM

## 2024-07-19 DIAGNOSIS — Z79.899 LONG TERM USE OF DRUG: ICD-10-CM

## 2024-07-19 DIAGNOSIS — F41.9 ANXIETY: ICD-10-CM

## 2024-07-19 LAB — HBA1C MFR BLD: 8.8 %

## 2024-07-19 PROCEDURE — 99214 OFFICE O/P EST MOD 30 MIN: CPT | Performed by: FAMILY MEDICINE

## 2024-07-19 PROCEDURE — 83036 HEMOGLOBIN GLYCOSYLATED A1C: CPT | Performed by: FAMILY MEDICINE

## 2024-07-19 PROCEDURE — 3079F DIAST BP 80-89 MM HG: CPT | Performed by: FAMILY MEDICINE

## 2024-07-19 PROCEDURE — 3074F SYST BP LT 130 MM HG: CPT | Performed by: FAMILY MEDICINE

## 2024-07-19 RX ORDER — CLONAZEPAM 0.5 MG/1
0.5 TABLET ORAL NIGHTLY PRN
Qty: 10 TABLET | Refills: 0 | Status: SHIPPED | OUTPATIENT
Start: 2024-07-19 | End: 2024-07-29

## 2024-07-19 RX ORDER — VARENICLINE TARTRATE 1 MG/1
TABLET, FILM COATED ORAL
Qty: 56 TABLET | Refills: 1 | Status: SHIPPED | OUTPATIENT
Start: 2024-07-19

## 2024-07-19 SDOH — ECONOMIC STABILITY: FOOD INSECURITY: WITHIN THE PAST 12 MONTHS, YOU WORRIED THAT YOUR FOOD WOULD RUN OUT BEFORE YOU GOT MONEY TO BUY MORE.: NEVER TRUE

## 2024-07-19 SDOH — ECONOMIC STABILITY: FOOD INSECURITY: WITHIN THE PAST 12 MONTHS, THE FOOD YOU BOUGHT JUST DIDN'T LAST AND YOU DIDN'T HAVE MONEY TO GET MORE.: NEVER TRUE

## 2024-07-19 SDOH — ECONOMIC STABILITY: INCOME INSECURITY: HOW HARD IS IT FOR YOU TO PAY FOR THE VERY BASICS LIKE FOOD, HOUSING, MEDICAL CARE, AND HEATING?: NOT HARD AT ALL

## 2024-07-19 NOTE — PROGRESS NOTES
[facility]  Chief Complaint   Patient presents with    Medication Management    Wrist Pain       History of Present Illness:   Kayla Mann is a 39 y.o. female       HPI:  Here for f/u diabetes. Not checking sugars.   Added januvia in March. Had vomiting with victoza.   Gma  last week. Her anxiety has been worse, crying spells and trouble sleeping.   Wrist pain, playing with son recently.   Night sweats x 3 weeks.   No fever, chills.  No cough, no hemoptysis.  +dysuria        Health Maintenance  Health Maintenance Due   Topic Date Due    Hepatitis B vaccine (1 of 3 - 3-dose series) Never done    Varicella vaccine (1 of 2 - 2-dose childhood series) Never done    Pneumococcal 0-64 years Vaccine (1 of 2 - PCV) Never done    Hepatitis C screen  Never done    COVID-19 Vaccine ( - - season) 2023    Diabetic retinal exam  2023       Past Medical, Family, and Social History:     Past Medical History:   Diagnosis Date    Abnormal cervical Papanicolaou smear 2019    Anemia     receiving IV iron last dose in 2020    Arthritis     RA    Asthma     inhaler PRN    Depression 2021    Epilepsy (HCC)     2014    Essential hypertension     Gastroparesis     Generalized convulsive epilepsy (HCC) 2022    Gestational diabetes     Heart abnormality     mild heart murmur no medications required    Herpes simplex virus (HSV) infection     Ill-defined condition     fibromyalgia    Infertility, female     Liver disease     fatty liver    Low back pain     Obstructive sleep apnea 2022    Polycystic disease, ovaries     Posttraumatic stress disorder 2022    Psychiatric problem     depression, PTSD      Past Surgical History:   Procedure Laterality Date    CT DRAIN SOFT TISSUE ABSCESS  3/8/2021    CT DRAIN SOFT TISSUE ABSCESS 3/8/2021 [facility] RAD CT    GASTRIC BYPASS,OBESE<150CM VISHAL-EN-Y      pyloraplasty     OTHER SURGICAL HISTORY      appendix, gallbladder,

## 2024-07-19 NOTE — PROGRESS NOTES
Reviewed record in preparation for visit and have necessary documentation  Pt did not bring medication to office visit for review  opportunity was given for questions  \"Have you been to the ER, urgent care clinic since your last visit?  Hospitalized since your last visit?\"    NO    “Have you seen or consulted any other health care providers outside of [facility] since your last visit?”    NO      Click Here for Release of Records Request     Goals that were addressed and/or need to be completed during or after this appointment include   Health Maintenance Due   Topic Date Due    Hepatitis B vaccine (1 of 3 - 3-dose series) Never done    Varicella vaccine (1 of 2 - 2-dose childhood series) Never done    Pneumococcal 0-64 years Vaccine (1 of 2 - PCV) Never done    Hepatitis C screen  Never done    COVID-19 Vaccine (2 - 2023-24 season) 09/01/2023    Diabetic retinal exam  09/16/2023

## 2024-07-21 PROBLEM — R87.619 ABNORMAL CERVICAL PAPANICOLAOU SMEAR: Status: ACTIVE | Noted: 2019-01-11

## 2024-07-21 RX ORDER — ONDANSETRON 4 MG/1
TABLET, ORALLY DISINTEGRATING ORAL
Qty: 20 TABLET | Refills: 1 | Status: SHIPPED | OUTPATIENT
Start: 2024-07-21

## 2024-07-21 RX ORDER — ONDANSETRON 4 MG/1
TABLET, ORALLY DISINTEGRATING ORAL
Qty: 20 TABLET | Refills: 1 | OUTPATIENT
Start: 2024-07-21

## 2024-07-23 ENCOUNTER — TELEPHONE (OUTPATIENT)
Facility: CLINIC | Age: 39
End: 2024-07-23

## 2024-07-23 NOTE — TELEPHONE ENCOUNTER
Attempted to call. No answer. Message left.   Did patient have labs collected after her last visit?   Need CBC CMP recollected, if so. Lab did not have specimen.

## 2024-07-24 DIAGNOSIS — R30.0 DYSURIA: ICD-10-CM

## 2024-07-24 DIAGNOSIS — E11.40 CONTROLLED TYPE 2 DIABETES MELLITUS WITH DIABETIC NEUROPATHY, WITHOUT LONG-TERM CURRENT USE OF INSULIN (HCC): ICD-10-CM

## 2024-07-25 ENCOUNTER — TELEPHONE (OUTPATIENT)
Facility: CLINIC | Age: 39
End: 2024-07-25

## 2024-07-25 DIAGNOSIS — E11.9 CONTROLLED TYPE 2 DIABETES MELLITUS WITHOUT COMPLICATION, WITH LONG-TERM CURRENT USE OF INSULIN (HCC): ICD-10-CM

## 2024-07-25 DIAGNOSIS — Z79.4 CONTROLLED TYPE 2 DIABETES MELLITUS WITHOUT COMPLICATION, WITH LONG-TERM CURRENT USE OF INSULIN (HCC): ICD-10-CM

## 2024-07-25 DIAGNOSIS — N76.0 ACUTE VAGINITIS: Primary | ICD-10-CM

## 2024-07-25 DIAGNOSIS — R30.0 DYSURIA: ICD-10-CM

## 2024-07-25 LAB
ALBUMIN SERPL-MCNC: 3.7 G/DL (ref 3.5–5)
ALBUMIN/GLOB SERPL: 1.1 (ref 1.1–2.2)
ALP SERPL-CCNC: 98 U/L (ref 45–117)
ALT SERPL-CCNC: 35 U/L (ref 12–78)
ANION GAP SERPL CALC-SCNC: 7 MMOL/L (ref 5–15)
APPEARANCE UR: CLEAR
AST SERPL-CCNC: 14 U/L (ref 15–37)
BACTERIA URNS QL MICRO: ABNORMAL /HPF
BASOPHILS # BLD: 0.1 K/UL (ref 0–0.1)
BASOPHILS NFR BLD: 0 % (ref 0–1)
BILIRUB SERPL-MCNC: 0.3 MG/DL (ref 0.2–1)
BILIRUB UR QL: NEGATIVE
BUN SERPL-MCNC: 10 MG/DL (ref 6–20)
BUN/CREAT SERPL: 14 (ref 12–20)
CALCIUM SERPL-MCNC: 9.4 MG/DL (ref 8.5–10.1)
CHLORIDE SERPL-SCNC: 109 MMOL/L (ref 97–108)
CO2 SERPL-SCNC: 25 MMOL/L (ref 21–32)
COLOR UR: ABNORMAL
CREAT SERPL-MCNC: 0.73 MG/DL (ref 0.55–1.02)
DIFFERENTIAL METHOD BLD: ABNORMAL
EOSINOPHIL # BLD: 0.3 K/UL (ref 0–0.4)
EOSINOPHIL NFR BLD: 2 % (ref 0–7)
EPITH CASTS URNS QL MICRO: ABNORMAL /LPF
ERYTHROCYTE [DISTWIDTH] IN BLOOD BY AUTOMATED COUNT: 13.8 % (ref 11.5–14.5)
GLOBULIN SER CALC-MCNC: 3.3 G/DL (ref 2–4)
GLUCOSE SERPL-MCNC: 234 MG/DL (ref 65–100)
GLUCOSE UR STRIP.AUTO-MCNC: >1000 MG/DL
HCT VFR BLD AUTO: 45 % (ref 35–47)
HGB BLD-MCNC: 14.3 G/DL (ref 11.5–16)
HGB UR QL STRIP: ABNORMAL
IMM GRANULOCYTES # BLD AUTO: 0.1 K/UL (ref 0–0.04)
IMM GRANULOCYTES NFR BLD AUTO: 0 % (ref 0–0.5)
KETONES UR QL STRIP.AUTO: ABNORMAL MG/DL
LEUKOCYTE ESTERASE UR QL STRIP.AUTO: NEGATIVE
LYMPHOCYTES # BLD: 3.4 K/UL (ref 0.8–3.5)
LYMPHOCYTES NFR BLD: 23 % (ref 12–49)
MCH RBC QN AUTO: 31.6 PG (ref 26–34)
MCHC RBC AUTO-ENTMCNC: 31.8 G/DL (ref 30–36.5)
MCV RBC AUTO: 99.6 FL (ref 80–99)
MONOCYTES # BLD: 0.8 K/UL (ref 0–1)
MONOCYTES NFR BLD: 5 % (ref 5–13)
NEUTS SEG # BLD: 10.5 K/UL (ref 1.8–8)
NEUTS SEG NFR BLD: 70 % (ref 32–75)
NITRITE UR QL STRIP.AUTO: NEGATIVE
NRBC # BLD: 0 K/UL (ref 0–0.01)
NRBC BLD-RTO: 0 PER 100 WBC
PH UR STRIP: 6 (ref 5–8)
PLATELET # BLD AUTO: 476 K/UL (ref 150–400)
PMV BLD AUTO: 9.6 FL (ref 8.9–12.9)
POTASSIUM SERPL-SCNC: 4.2 MMOL/L (ref 3.5–5.1)
PROT SERPL-MCNC: 7 G/DL (ref 6.4–8.2)
PROT UR STRIP-MCNC: ABNORMAL MG/DL
RBC # BLD AUTO: 4.52 M/UL (ref 3.8–5.2)
RBC #/AREA URNS HPF: ABNORMAL /HPF (ref 0–5)
SODIUM SERPL-SCNC: 141 MMOL/L (ref 136–145)
SP GR UR REFRACTOMETRY: 1.02 (ref 1–1.03)
TSH SERPL DL<=0.05 MIU/L-ACNC: 1.14 UIU/ML (ref 0.36–3.74)
UROBILINOGEN UR QL STRIP.AUTO: 0.2 EU/DL (ref 0.2–1)
WBC # BLD AUTO: 15.2 K/UL (ref 3.6–11)
WBC URNS QL MICRO: ABNORMAL /HPF (ref 0–4)

## 2024-07-25 RX ORDER — PEN NEEDLE, DIABETIC 31 G X1/4"
1 NEEDLE, DISPOSABLE MISCELLANEOUS DAILY
Qty: 100 EACH | Refills: 3 | Status: SHIPPED | OUTPATIENT
Start: 2024-07-25

## 2024-07-25 RX ORDER — FLUCONAZOLE 150 MG/1
150 TABLET ORAL
Qty: 2 TABLET | Refills: 0 | Status: SHIPPED | OUTPATIENT
Start: 2024-07-25 | End: 2024-07-31

## 2024-07-25 RX ORDER — INSULIN GLARGINE 100 [IU]/ML
INJECTION, SOLUTION SUBCUTANEOUS
Qty: 5 ADJUSTABLE DOSE PRE-FILLED PEN SYRINGE | Refills: 3 | Status: SHIPPED | OUTPATIENT
Start: 2024-07-25

## 2024-07-25 RX ORDER — CIPROFLOXACIN 500 MG/1
500 TABLET, FILM COATED ORAL 2 TIMES DAILY
Qty: 6 TABLET | Refills: 0 | Status: SHIPPED | OUTPATIENT
Start: 2024-07-25 | End: 2024-07-28

## 2024-07-25 NOTE — PROGRESS NOTES
C/o dysuria, ?yeast, u/a showed blood but that is not new. Culture still pending.  Sent in cipro and diflucan.  Will start lantus 10 units qhs --last A1c 8.8.   F/u 1 month for recheck. Patient verbalized understanding and accepts plan & risks.    Hemoglobin A1C   Date Value Ref Range Status   08/18/2022 6.5 (H) 4.0 - 5.6 % Final     Comment:     NEW METHOD  PLEASE NOTE NEW REFERENCE RANGE  (NOTE)  HbA1C Interpretive Ranges  <5.7              Normal  5.7 - 6.4         Consider Prediabetes  >6.5              Consider Diabetes       Hemoglobin A1C, POC   Date Value Ref Range Status   07/19/2024 8.8 % Final

## 2024-07-25 NOTE — TELEPHONE ENCOUNTER
----- Message from Rima Rubio sent at 7/25/2024 10:08 AM EDT -----  Regarding: ECC Results Request  ECC Results Request    Which lab or imaging result is the patient calling about: Urine test        Was this a Non-[facility] Provider: No    Date the test was preformed (OSMAR/MILTON/YYYY): 7/24/2024  --------------------------------------------------------------------------------------------------------------------------    Relationship to Patient: Self     Call Back Info: OK to leave message on voicemail  Preferred Call Back Number: [phone] (home)         Script faxed to [facility] [phone] pharmacy.

## 2024-07-25 NOTE — TELEPHONE ENCOUNTER
Call to pt. Pt made aware all results are not back yet, the culture is still pending but someone would get with her once back and reviewed by Dr. Burleson. She verbalized understanding.     Pt stated she could not access her mychart. User name given to pt and password reset to [email or web address]. Pt made aware

## 2024-07-26 ENCOUNTER — TELEPHONE (OUTPATIENT)
Facility: CLINIC | Age: 39
End: 2024-07-26

## 2024-07-26 LAB
BACTERIA SPEC CULT: ABNORMAL
BACTERIA SPEC CULT: ABNORMAL
CC UR VC: ABNORMAL
SERVICE CMNT-IMP: ABNORMAL

## 2024-07-26 NOTE — TELEPHONE ENCOUNTER
Called to schedule an appt. No answer, left message to call back.   Dr. Burleson would like patient to schedule an appt in Aug for diabetes mgt.

## 2024-07-29 ENCOUNTER — TELEPHONE (OUTPATIENT)
Facility: CLINIC | Age: 39
End: 2024-07-29

## 2024-08-06 ENCOUNTER — TELEPHONE (OUTPATIENT)
Facility: CLINIC | Age: 39
End: 2024-08-06

## 2024-08-06 NOTE — TELEPHONE ENCOUNTER
6 Pt called stating the pharmacy told her she needs PA for the following:    -Continuous Glucose Sensor    Please advise..

## 2024-08-12 RX ORDER — BLOOD SUGAR DIAGNOSTIC
STRIP MISCELLANEOUS
Qty: 100 STRIP | Refills: 3 | Status: SHIPPED | OUTPATIENT
Start: 2024-08-12

## 2024-08-20 DIAGNOSIS — R11.2 NAUSEA AND VOMITING, UNSPECIFIED VOMITING TYPE: ICD-10-CM

## 2024-08-20 RX ORDER — ONDANSETRON 4 MG/1
TABLET, ORALLY DISINTEGRATING ORAL
Qty: 20 TABLET | Refills: 1 | Status: SHIPPED | OUTPATIENT
Start: 2024-08-20

## 2024-09-09 DIAGNOSIS — Z72.0 TOBACCO ABUSE: ICD-10-CM

## 2024-09-09 RX ORDER — VARENICLINE TARTRATE 1 MG/1
TABLET, FILM COATED ORAL
Qty: 56 TABLET | Refills: 1 | Status: SHIPPED | OUTPATIENT
Start: 2024-09-09

## 2024-09-16 DIAGNOSIS — E78.2 MIXED HYPERLIPIDEMIA: ICD-10-CM

## 2024-09-16 DIAGNOSIS — I10 ESSENTIAL (PRIMARY) HYPERTENSION: ICD-10-CM

## 2024-09-16 RX ORDER — LOSARTAN POTASSIUM 25 MG/1
25 TABLET ORAL DAILY
Qty: 90 TABLET | Refills: 1 | Status: SHIPPED | OUTPATIENT
Start: 2024-09-16

## 2024-09-16 RX ORDER — ATORVASTATIN CALCIUM 40 MG/1
40 TABLET, FILM COATED ORAL DAILY
Qty: 90 TABLET | Refills: 1 | Status: SHIPPED | OUTPATIENT
Start: 2024-09-16

## 2024-09-26 DIAGNOSIS — I10 ESSENTIAL (PRIMARY) HYPERTENSION: ICD-10-CM

## 2024-09-26 DIAGNOSIS — E11.65 CONTROLLED TYPE 2 DIABETES MELLITUS WITH HYPERGLYCEMIA, WITHOUT LONG-TERM CURRENT USE OF INSULIN (HCC): ICD-10-CM

## 2024-09-26 RX ORDER — SITAGLIPTIN 100 MG/1
100 TABLET, FILM COATED ORAL DAILY
Qty: 90 TABLET | Refills: 1 | Status: SHIPPED | OUTPATIENT
Start: 2024-09-26

## 2024-09-26 RX ORDER — AMLODIPINE BESYLATE 10 MG/1
10 TABLET ORAL EVERY MORNING
Qty: 90 TABLET | Refills: 1 | Status: SHIPPED | OUTPATIENT
Start: 2024-09-26

## 2024-09-27 DIAGNOSIS — Z72.0 TOBACCO ABUSE: ICD-10-CM

## 2024-09-27 RX ORDER — VARENICLINE TARTRATE 1 MG/1
TABLET, FILM COATED ORAL
Qty: 56 TABLET | Refills: 1 | Status: SHIPPED | OUTPATIENT
Start: 2024-09-27

## 2024-10-02 DIAGNOSIS — R11.2 NAUSEA AND VOMITING, UNSPECIFIED VOMITING TYPE: ICD-10-CM

## 2024-10-02 RX ORDER — ONDANSETRON 4 MG/1
TABLET, ORALLY DISINTEGRATING ORAL
Qty: 20 TABLET | Refills: 1 | OUTPATIENT
Start: 2024-10-02

## 2024-10-14 DIAGNOSIS — F41.9 ANXIETY: ICD-10-CM

## 2024-10-14 RX ORDER — BUSPIRONE HYDROCHLORIDE 10 MG/1
TABLET ORAL
Qty: 90 TABLET | Refills: 2 | Status: SHIPPED | OUTPATIENT
Start: 2024-10-14

## 2024-10-15 DIAGNOSIS — R11.2 NAUSEA AND VOMITING, UNSPECIFIED VOMITING TYPE: ICD-10-CM

## 2024-10-15 RX ORDER — ONDANSETRON 4 MG/1
TABLET, ORALLY DISINTEGRATING ORAL
Qty: 20 TABLET | Refills: 1 | Status: SHIPPED | OUTPATIENT
Start: 2024-10-15

## 2024-10-21 ENCOUNTER — OFFICE VISIT (OUTPATIENT)
Facility: CLINIC | Age: 39
End: 2024-10-21
Payer: MEDICAID

## 2024-10-21 ENCOUNTER — TELEPHONE (OUTPATIENT)
Facility: CLINIC | Age: 39
End: 2024-10-21

## 2024-10-21 VITALS
HEART RATE: 89 BPM | DIASTOLIC BLOOD PRESSURE: 83 MMHG | OXYGEN SATURATION: 98 % | WEIGHT: 210 LBS | HEIGHT: 65 IN | RESPIRATION RATE: 16 BRPM | BODY MASS INDEX: 34.99 KG/M2 | TEMPERATURE: 97.1 F | SYSTOLIC BLOOD PRESSURE: 115 MMHG

## 2024-10-21 DIAGNOSIS — M54.50 CHRONIC BILATERAL LOW BACK PAIN WITHOUT SCIATICA: ICD-10-CM

## 2024-10-21 DIAGNOSIS — G89.29 CHRONIC BILATERAL LOW BACK PAIN WITHOUT SCIATICA: ICD-10-CM

## 2024-10-21 DIAGNOSIS — Z23 NEED FOR VACCINATION: ICD-10-CM

## 2024-10-21 DIAGNOSIS — M79.605 LEFT LEG PAIN: Primary | ICD-10-CM

## 2024-10-21 DIAGNOSIS — E11.9 CONTROLLED TYPE 2 DIABETES MELLITUS WITHOUT COMPLICATION, WITH LONG-TERM CURRENT USE OF INSULIN (HCC): ICD-10-CM

## 2024-10-21 DIAGNOSIS — Z79.4 CONTROLLED TYPE 2 DIABETES MELLITUS WITHOUT COMPLICATION, WITH LONG-TERM CURRENT USE OF INSULIN (HCC): ICD-10-CM

## 2024-10-21 LAB — HBA1C MFR BLD: 8.8 %

## 2024-10-21 PROCEDURE — 3079F DIAST BP 80-89 MM HG: CPT | Performed by: FAMILY MEDICINE

## 2024-10-21 PROCEDURE — 99214 OFFICE O/P EST MOD 30 MIN: CPT | Performed by: FAMILY MEDICINE

## 2024-10-21 PROCEDURE — 83036 HEMOGLOBIN GLYCOSYLATED A1C: CPT | Performed by: FAMILY MEDICINE

## 2024-10-21 PROCEDURE — 90471 IMMUNIZATION ADMIN: CPT | Performed by: FAMILY MEDICINE

## 2024-10-21 PROCEDURE — 90661 CCIIV3 VAC ABX FR 0.5 ML IM: CPT | Performed by: FAMILY MEDICINE

## 2024-10-21 PROCEDURE — 3074F SYST BP LT 130 MM HG: CPT | Performed by: FAMILY MEDICINE

## 2024-10-21 RX ORDER — METHYLPREDNISOLONE 4 MG/1
TABLET ORAL
Qty: 1 KIT | Refills: 0 | Status: CANCELLED | OUTPATIENT
Start: 2024-10-21

## 2024-10-21 RX ORDER — METHOCARBAMOL 750 MG/1
750 TABLET, FILM COATED ORAL 3 TIMES DAILY
Qty: 30 TABLET | Refills: 2 | Status: SHIPPED | OUTPATIENT
Start: 2024-10-21

## 2024-10-21 RX ORDER — PREDNISONE 20 MG/1
20 TABLET ORAL DAILY
Qty: 5 TABLET | Refills: 0 | Status: SHIPPED | OUTPATIENT
Start: 2024-10-21 | End: 2024-10-26

## 2024-10-21 RX ORDER — PEN NEEDLE, DIABETIC 31 G X1/4"
1 NEEDLE, DISPOSABLE MISCELLANEOUS DAILY
Qty: 100 EACH | Refills: 3 | Status: SHIPPED | OUTPATIENT
Start: 2024-10-21

## 2024-10-21 RX ORDER — INSULIN GLARGINE 100 [IU]/ML
INJECTION, SOLUTION SUBCUTANEOUS
Qty: 5 ADJUSTABLE DOSE PRE-FILLED PEN SYRINGE | Refills: 3 | Status: SHIPPED | OUTPATIENT
Start: 2024-10-21

## 2024-10-21 NOTE — PROGRESS NOTES
Chief Complaint   Patient presents with    3 Month Follow-Up    Fall     Left leg pain , lower back pain from fall 10/18/24 DDD in lower back.         \"Have you been to the ER, urgent care clinic since your last visit?  Hospitalized since your last visit?\"    [facility]     “Have you seen or consulted any other health care providers outside of [facility] since your last visit?”    NO     “Have you had a pap smear?”    NO    Date of last Cervical Cancer screen (HPV or PAP): 9/10/2021             Click Here for Release of Records Request     Health Maintenance Due   Topic Date Due    Pneumococcal 0-64 years Vaccine (1 of 2 - PCV) Never done    Varicella vaccine (1 of 2 - 13+ 2-dose series) Never done    Hepatitis C screen  Never done    Hepatitis B vaccine (1 of 3 - 19+ 3-dose series) Never done    Diabetic retinal exam  09/16/2023    Flu vaccine (1) 08/01/2024    COVID-19 Vaccine (2 - 2023-24 season) 09/01/2024    Cervical cancer screen  09/10/2024       
Type: Allergy; Severity: Severe; Reaction(s): Seziure  Reaction Type: Allergy    Lactose Nausea And Vomiting and Nausea Only    Sulfa Antibiotics Rash and Swelling    Sulfamethoxazole-Trimethoprim Itching, Rash and Swelling    Trimethoprim Other (See Comments)     Reaction Type: Allergy     Social History     Tobacco Use    Smoking status: Former     Current packs/day: 0.00     Average packs/day: 0.3 packs/day for 19.7 years (4.9 ttl pk-yrs)     Types: Cigarettes     Start date:      Quit date: 2024     Years since quittin.1    Smokeless tobacco: Never   Substance Use Topics    Alcohol use: Never    Drug use: Yes     Frequency: 7.0 times per week     Types: Marijuana (Weed)        Review of Systems  No fever, chills, chest pain, cough, shortness of breath.      Objective:     /83 (Site: Right Upper Arm, Position: Sitting, Cuff Size: Large Adult)   Pulse 89   Temp 97.1 °F (36.2 °C) (Oral)   Resp 16   Ht 1.651 m (5' 5\")   Wt 95.3 kg (210 lb)   SpO2 98%   BMI 34.95 kg/m²   General: Alert and oriented and in no acute distress. Responds to all questions appropriately.  EYES: Sclera and conjunctiva clear.  LUNGS: Clear to auscultation bilaterally, no wheeze, rales and rhonchi.  CARDIOVASCULAR: Regular, rate, and rhythm without murmurs, gallops or rubs.   MSK:     Strength grossly intact upper extremities; ambulating with cane.  Tenderness to palpation of paralumbar muscles; no supraspinous tenderness.   EXTREMITIES: Well perfused.  Moving all extremities equally. No calf edema, no palpable cords, negative ollie's sign. TTP L tib/fib and toes    Testing preformed onsite at today's visit:  Results for orders placed or performed in visit on 10/21/24   AMB POC HEMOGLOBIN A1C   Result Value Ref Range    Hemoglobin A1C, POC 8.8 %        Assessment and orders:       ICD-10-CM    1. Left leg pain  M79.605 XR TIBIA FIBULA LEFT (2 VIEWS)     XR FOOT LEFT (2 VIEWS)      2. Need for vaccination  Z23 Influenza,

## 2024-10-21 NOTE — ASSESSMENT & PLAN NOTE
Short burst of steriods, monitor fsbs while on med  Orders:    predniSONE (DELTASONE) 20 MG tablet; Take 1 tablet by mouth daily for 5 days    methocarbamol (ROBAXIN) 750 MG tablet; Take 1 tablet by mouth 3 times daily

## 2024-10-21 NOTE — TELEPHONE ENCOUNTER
Pt states Dr Burleson was going to send in a muscle relaxer for her to [facility]. Please advise.

## 2024-11-05 DIAGNOSIS — F43.21 GRIEF: ICD-10-CM

## 2024-11-05 DIAGNOSIS — F51.04 PSYCHOPHYSIOLOGICAL INSOMNIA: ICD-10-CM

## 2024-11-05 DIAGNOSIS — R11.2 NAUSEA AND VOMITING, UNSPECIFIED VOMITING TYPE: ICD-10-CM

## 2024-11-05 RX ORDER — ONDANSETRON 4 MG/1
TABLET, ORALLY DISINTEGRATING ORAL
Qty: 20 TABLET | Refills: 1 | Status: SHIPPED | OUTPATIENT
Start: 2024-11-05

## 2024-11-05 RX ORDER — TRAZODONE HYDROCHLORIDE 50 MG/1
TABLET, FILM COATED ORAL
Qty: 30 TABLET | Refills: 1 | Status: SHIPPED | OUTPATIENT
Start: 2024-11-05

## 2024-12-05 DIAGNOSIS — R11.2 NAUSEA AND VOMITING, UNSPECIFIED VOMITING TYPE: ICD-10-CM

## 2024-12-05 RX ORDER — ONDANSETRON 4 MG/1
TABLET, ORALLY DISINTEGRATING ORAL
Qty: 20 TABLET | Refills: 1 | Status: SHIPPED | OUTPATIENT
Start: 2024-12-05

## 2024-12-12 ENCOUNTER — OFFICE VISIT (OUTPATIENT)
Facility: CLINIC | Age: 39
End: 2024-12-12

## 2024-12-12 VITALS
OXYGEN SATURATION: 98 % | HEART RATE: 94 BPM | BODY MASS INDEX: 36.99 KG/M2 | TEMPERATURE: 97.2 F | RESPIRATION RATE: 18 BRPM | WEIGHT: 222 LBS | SYSTOLIC BLOOD PRESSURE: 126 MMHG | HEIGHT: 65 IN | DIASTOLIC BLOOD PRESSURE: 81 MMHG

## 2024-12-12 DIAGNOSIS — Z79.899 LONG TERM USE OF DRUG: ICD-10-CM

## 2024-12-12 DIAGNOSIS — K13.0 LIP LESION: ICD-10-CM

## 2024-12-12 DIAGNOSIS — K21.9 GASTROESOPHAGEAL REFLUX DISEASE WITHOUT ESOPHAGITIS: ICD-10-CM

## 2024-12-12 DIAGNOSIS — F33.2 SEVERE EPISODE OF RECURRENT MAJOR DEPRESSIVE DISORDER, WITHOUT PSYCHOTIC FEATURES (HCC): Primary | ICD-10-CM

## 2024-12-12 DIAGNOSIS — E78.2 MIXED HYPERLIPIDEMIA: ICD-10-CM

## 2024-12-12 DIAGNOSIS — Z11.59 NEED FOR HEPATITIS C SCREENING TEST: ICD-10-CM

## 2024-12-12 DIAGNOSIS — Z30.8 ENCOUNTER FOR OTHER CONTRACEPTIVE MANAGEMENT: ICD-10-CM

## 2024-12-12 DIAGNOSIS — I10 BENIGN HYPERTENSION: ICD-10-CM

## 2024-12-12 DIAGNOSIS — D72.829 LEUKOCYTOSIS, UNSPECIFIED TYPE: ICD-10-CM

## 2024-12-12 DIAGNOSIS — Z11.3 SCREENING FOR STD (SEXUALLY TRANSMITTED DISEASE): ICD-10-CM

## 2024-12-12 RX ORDER — QUETIAPINE FUMARATE 25 MG/1
25 TABLET, FILM COATED ORAL
Qty: 30 TABLET | Refills: 3 | Status: SHIPPED | OUTPATIENT
Start: 2024-12-12

## 2024-12-12 ASSESSMENT — PATIENT HEALTH QUESTIONNAIRE - PHQ9
7. TROUBLE CONCENTRATING ON THINGS, SUCH AS READING THE NEWSPAPER OR WATCHING TELEVISION: MORE THAN HALF THE DAYS
5. POOR APPETITE OR OVEREATING: NEARLY EVERY DAY
8. MOVING OR SPEAKING SO SLOWLY THAT OTHER PEOPLE COULD HAVE NOTICED. OR THE OPPOSITE, BEING SO FIGETY OR RESTLESS THAT YOU HAVE BEEN MOVING AROUND A LOT MORE THAN USUAL: MORE THAN HALF THE DAYS
9. THOUGHTS THAT YOU WOULD BE BETTER OFF DEAD, OR OF HURTING YOURSELF: MORE THAN HALF THE DAYS
SUM OF ALL RESPONSES TO PHQ QUESTIONS 1-9: 23
1. LITTLE INTEREST OR PLEASURE IN DOING THINGS: MORE THAN HALF THE DAYS
SUM OF ALL RESPONSES TO PHQ QUESTIONS 1-9: 21
SUM OF ALL RESPONSES TO PHQ QUESTIONS 1-9: 23
10. IF YOU CHECKED OFF ANY PROBLEMS, HOW DIFFICULT HAVE THESE PROBLEMS MADE IT FOR YOU TO DO YOUR WORK, TAKE CARE OF THINGS AT HOME, OR GET ALONG WITH OTHER PEOPLE: VERY DIFFICULT
2. FEELING DOWN, DEPRESSED OR HOPELESS: NEARLY EVERY DAY
4. FEELING TIRED OR HAVING LITTLE ENERGY: NEARLY EVERY DAY
3. TROUBLE FALLING OR STAYING ASLEEP: NEARLY EVERY DAY
6. FEELING BAD ABOUT YOURSELF - OR THAT YOU ARE A FAILURE OR HAVE LET YOURSELF OR YOUR FAMILY DOWN: NEARLY EVERY DAY
SUM OF ALL RESPONSES TO PHQ QUESTIONS 1-9: 23
SUM OF ALL RESPONSES TO PHQ9 QUESTIONS 1 & 2: 5

## 2024-12-12 ASSESSMENT — ANXIETY QUESTIONNAIRES
3. WORRYING TOO MUCH ABOUT DIFFERENT THINGS: NEARLY EVERY DAY
4. TROUBLE RELAXING: NEARLY EVERY DAY
2. NOT BEING ABLE TO STOP OR CONTROL WORRYING: NEARLY EVERY DAY
IF YOU CHECKED OFF ANY PROBLEMS ON THIS QUESTIONNAIRE, HOW DIFFICULT HAVE THESE PROBLEMS MADE IT FOR YOU TO DO YOUR WORK, TAKE CARE OF THINGS AT HOME, OR GET ALONG WITH OTHER PEOPLE: VERY DIFFICULT
5. BEING SO RESTLESS THAT IT IS HARD TO SIT STILL: NEARLY EVERY DAY
6. BECOMING EASILY ANNOYED OR IRRITABLE: NEARLY EVERY DAY
1. FEELING NERVOUS, ANXIOUS, OR ON EDGE: NEARLY EVERY DAY
7. FEELING AFRAID AS IF SOMETHING AWFUL MIGHT HAPPEN: NEARLY EVERY DAY
GAD7 TOTAL SCORE: 21

## 2024-12-12 ASSESSMENT — COLUMBIA-SUICIDE SEVERITY RATING SCALE - C-SSRS
1. WITHIN THE PAST MONTH, HAVE YOU WISHED YOU WERE DEAD OR WISHED YOU COULD GO TO SLEEP AND NOT WAKE UP?: YES
2. HAVE YOU ACTUALLY HAD ANY THOUGHTS OF KILLING YOURSELF?: NO
6. HAVE YOU EVER DONE ANYTHING, STARTED TO DO ANYTHING, OR PREPARED TO DO ANYTHING TO END YOUR LIFE?: NO

## 2024-12-12 NOTE — ASSESSMENT & PLAN NOTE
Uncontrolled , the following changes in treatment are made start seroquel, lab results and schedule of future lab studies reviewed with patient, and reviewed medications and side effects in detail  Medication may take two to four weeks to work.  If your medication is an SSRI nausea may be a temporary side effect.  Monitor for any worsening of symptoms and if occur stop the medication and contact our office. Referred to C for counseling, given packet today. Patient verbalized understanding, accepts risk and agree with plan. Patient contracts for safety.    Completed safety plan with patient and given copy as well as resources such as [id number] to call if has suicidal ideation.     Orders:    QUEtiapine (SEROQUEL) 25 MG tablet; Take 1 tablet by mouth nightly     -Keep sp02>88% on supplemental oxygen  -Prednisone taper as outlined   -Start Protonix daily for PUD ppx while on steroids   -Duoneb q6  -d/c on Stiolto (home med) and Pulmicort BID  -f/u with pulmonary as outpt in 2 weeks

## 2024-12-12 NOTE — ASSESSMENT & PLAN NOTE
Controlled, continue current meds       Hold the trazodone and just take the seroquel at bedtime and it should help with sleep. If you do not see any improvement in 2 weeks, let me know and I will increase dose.   Justification for level of billing, time spent: 45 min with patient, reviewing chart and face to face exam, clinical documentation. Time prior to the visit was spent reviewing external notes results and imaging reports.  As well during the visit, time included evaluating the patient, discussing results and plans with the patient, and coordinating care.  As well, after the visit additional time spent documenting clinical care, interpreting results, and coordinating care.  This time was all spent during the date of service.

## 2024-12-12 NOTE — PROGRESS NOTES
\"Have you been to the ER, urgent care clinic since your last visit?  Hospitalized since your last visit?\"    Yes- [facility]     “Have you seen or consulted any other health care providers outside of [facility] since your last visit?”    NO     “Have you had a pap smear?”    NO    Date of last Cervical Cancer screen (HPV or PAP): 9/10/2021             Click Here for Release of Records Request     Health Maintenance Due   Topic Date Due    Pneumococcal 0-64 years Vaccine (1 of 2 - PCV) Never done    Varicella vaccine (1 of 2 - 13+ 2-dose series) Never done    Hepatitis C screen  Never done    Hepatitis B vaccine (1 of 3 - 19+ 3-dose series) Never done    Diabetic retinal exam  09/16/2023    COVID-19 Vaccine (2 - 2023-24 season) 09/01/2024    Cervical cancer screen  09/10/2024     
face to face exam, clinical documentation. Time prior to the visit was spent reviewing external notes results and imaging reports.  As well during the visit, time included evaluating the patient, discussing results and plans with the patient, and coordinating care.  As well, after the visit additional time spent documenting clinical care, interpreting results, and coordinating care.  This time was all spent during the date of service.    Return in about 1 month (around 1/13/2025) for mental health.   I have discussed the diagnosis with the patient and the intended plan as seen in the above orders.  Social history, medical history, and labs were reviewed.  The patient has received an after-visit summary and questions were answered concerning future plans.  I have discussed medication side effects and warnings with the patient as well. Patient verbalized understanding and accepts plan & risks.        Anjelica Burleson MD  [facility]  12/12/24

## 2024-12-13 ENCOUNTER — TELEPHONE (OUTPATIENT)
Facility: CLINIC | Age: 39
End: 2024-12-13

## 2024-12-13 LAB
ALBUMIN SERPL-MCNC: 3.8 G/DL (ref 3.5–5)
ALBUMIN/GLOB SERPL: 1.2 (ref 1.1–2.2)
ALP SERPL-CCNC: 118 U/L (ref 45–117)
ALT SERPL-CCNC: 25 U/L (ref 12–78)
ANION GAP SERPL CALC-SCNC: 10 MMOL/L (ref 2–12)
AST SERPL-CCNC: 3 U/L (ref 15–37)
BASOPHILS # BLD: 0.1 K/UL (ref 0–0.1)
BASOPHILS NFR BLD: 1 % (ref 0–1)
BILIRUB SERPL-MCNC: 0.1 MG/DL (ref 0.2–1)
BUN SERPL-MCNC: 8 MG/DL (ref 6–20)
BUN/CREAT SERPL: 12 (ref 12–20)
CALCIUM SERPL-MCNC: 9.5 MG/DL (ref 8.5–10.1)
CHLORIDE SERPL-SCNC: 108 MMOL/L (ref 97–108)
CHOLEST SERPL-MCNC: 160 MG/DL
CO2 SERPL-SCNC: 19 MMOL/L (ref 21–32)
CREAT SERPL-MCNC: 0.68 MG/DL (ref 0.55–1.02)
DIFFERENTIAL METHOD BLD: ABNORMAL
EOSINOPHIL # BLD: 0.8 K/UL (ref 0–0.4)
EOSINOPHIL NFR BLD: 4 % (ref 0–7)
ERYTHROCYTE [DISTWIDTH] IN BLOOD BY AUTOMATED COUNT: 14.6 % (ref 11.5–14.5)
GLOBULIN SER CALC-MCNC: 3.2 G/DL (ref 2–4)
GLUCOSE SERPL-MCNC: 297 MG/DL (ref 65–100)
HCT VFR BLD AUTO: 42.4 % (ref 35–47)
HCV AB SER IA-ACNC: 0.15 INDEX
HCV AB SERPL QL IA: NONREACTIVE
HDLC SERPL-MCNC: 38 MG/DL
HDLC SERPL: 4.2 (ref 0–5)
HGB BLD-MCNC: 13.3 G/DL (ref 11.5–16)
HIV 1+2 AB+HIV1 P24 AG SERPL QL IA: NONREACTIVE
HIV 1/2 RESULT COMMENT: NORMAL
IMM GRANULOCYTES # BLD AUTO: 0.1 K/UL (ref 0–0.04)
IMM GRANULOCYTES NFR BLD AUTO: 1 % (ref 0–0.5)
LDLC SERPL CALC-MCNC: 69.2 MG/DL (ref 0–100)
LYMPHOCYTES # BLD: 4.8 K/UL (ref 0.8–3.5)
LYMPHOCYTES NFR BLD: 27 % (ref 12–49)
MCH RBC QN AUTO: 30.3 PG (ref 26–34)
MCHC RBC AUTO-ENTMCNC: 31.4 G/DL (ref 30–36.5)
MCV RBC AUTO: 96.6 FL (ref 80–99)
MONOCYTES # BLD: 0.8 K/UL (ref 0–1)
MONOCYTES NFR BLD: 4 % (ref 5–13)
NEUTS SEG # BLD: 11.4 K/UL (ref 1.8–8)
NEUTS SEG NFR BLD: 63 % (ref 32–75)
NRBC # BLD: 0 K/UL (ref 0–0.01)
NRBC BLD-RTO: 0 PER 100 WBC
PLATELET # BLD AUTO: 447 K/UL (ref 150–400)
PMV BLD AUTO: 10.3 FL (ref 8.9–12.9)
POTASSIUM SERPL-SCNC: 4.3 MMOL/L (ref 3.5–5.1)
PROT SERPL-MCNC: 7 G/DL (ref 6.4–8.2)
RBC # BLD AUTO: 4.39 M/UL (ref 3.8–5.2)
SODIUM SERPL-SCNC: 137 MMOL/L (ref 136–145)
TRIGL SERPL-MCNC: 264 MG/DL
VLDLC SERPL CALC-MCNC: 52.8 MG/DL
WBC # BLD AUTO: 17.9 K/UL (ref 3.6–11)

## 2024-12-17 DIAGNOSIS — D72.829 LEUKOCYTOSIS, UNSPECIFIED TYPE: ICD-10-CM

## 2024-12-18 LAB — FLOW CYTOMETRY: NORMAL

## 2024-12-28 DIAGNOSIS — R11.2 NAUSEA AND VOMITING, UNSPECIFIED VOMITING TYPE: ICD-10-CM

## 2024-12-30 RX ORDER — ONDANSETRON 4 MG/1
TABLET, ORALLY DISINTEGRATING ORAL
Qty: 20 TABLET | Refills: 1 | Status: SHIPPED | OUTPATIENT
Start: 2024-12-30

## 2025-01-03 ENCOUNTER — OFFICE VISIT (OUTPATIENT)
Age: 40
End: 2025-01-03
Payer: MEDICAID

## 2025-01-03 VITALS
HEIGHT: 65 IN | RESPIRATION RATE: 16 BRPM | DIASTOLIC BLOOD PRESSURE: 74 MMHG | BODY MASS INDEX: 35.82 KG/M2 | HEART RATE: 102 BPM | OXYGEN SATURATION: 98 % | WEIGHT: 215 LBS | SYSTOLIC BLOOD PRESSURE: 122 MMHG

## 2025-01-03 DIAGNOSIS — K13.0 LIP LESION: Primary | ICD-10-CM

## 2025-01-03 PROCEDURE — 99203 OFFICE O/P NEW LOW 30 MIN: CPT | Performed by: OTOLARYNGOLOGY

## 2025-01-03 PROCEDURE — 3078F DIAST BP <80 MM HG: CPT | Performed by: OTOLARYNGOLOGY

## 2025-01-03 PROCEDURE — 3074F SYST BP LT 130 MM HG: CPT | Performed by: OTOLARYNGOLOGY

## 2025-01-03 NOTE — PROGRESS NOTES
amLODIPine (NORVASC) 10 mg, Oral, EVERY MORNING    atorvastatin (LIPITOR) 40 mg, Oral, DAILY    busPIRone (BUSPAR) 10 MG tablet TAKE ONE TABLET BY MOUTH THREE TIMES DAILY AS NEEDED FOR ANXIETY    DULoxetine (CYMBALTA) 60 mg, Oral, EVERY MORNING    insulin glargine (LANTUS SOLOSTAR) 100 UNIT/ML injection pen 15 units SC qhs    Insulin Pen Needle ([employer] PEN NEEDLES) 31G X 6 MM MISC 1 each, Does not apply, DAILY    Januvia 100 mg, Oral, DAILY    losartan (COZAAR) 25 mg, Oral, DAILY    metFORMIN (GLUCOPHAGE-XR) 500 MG extended release tablet TAKE TWO TABLETS BY MOUTH IN THE MORNING AND TWO TABLETS IN THE EVENING    methocarbamol (ROBAXIN) 750 mg, Oral, 3 TIMES DAILY    omeprazole (PRILOSEC) 20 mg, Oral, 2 times daily    ondansetron (ZOFRAN-ODT) 4 MG disintegrating tablet TAKE ONE TABLET BY SUBLINGUAL ROUTE EVERY EIGHT HOURS AS NEEDED FOR NAUSEA/VOMITING    ONETOUCH ULTRA strip CHECK BLOOD SUGAR DAILY'    QUEtiapine (SEROQUEL) 25 mg, Oral, EVERY BEDTIME    varenicline (CHANTIX) 1 MG tablet TAKE 1 TABLET BY MOUTH 2 TIMES DAILY WITH MEALS       Allergies:   Allergies   Allergen Reactions    Latex Rash    Penicillins Rash and Swelling    Milk (Cow)     Promethazine Hcl     Codeine Rash     Reaction Type: Allergy; Severity: Mild    Cyclobenzaprine Other (See Comments)     Reaction Type: Allergy; Severity: Severe; Reaction(s): Seziure  Reaction Type: Allergy    Lactose Nausea And Vomiting and Nausea Only    Sulfa Antibiotics Rash and Swelling    Sulfamethoxazole-Trimethoprim Itching, Rash and Swelling    Trimethoprim Other (See Comments)     Reaction Type: Allergy       Social History:   Social History     Tobacco Use    Smoking status: Former     Current packs/day: 0.00     Average packs/day: 0.3 packs/day for 19.7 years (4.9 ttl pk-yrs)     Types: Cigarettes     Start date:      Quit date: 2024     Years since quittin.3    Smokeless tobacco: Never   Substance Use Topics    Alcohol use: Never    Drug use: Yes

## 2025-01-08 ENCOUNTER — TELEPHONE (OUTPATIENT)
Age: 40
End: 2025-01-08

## 2025-01-08 NOTE — TELEPHONE ENCOUNTER
Called patient to get her scheduled for surgery.  Patient didn't answer her phone, a voicemail with my direct line was left.

## 2025-01-09 ENCOUNTER — PREP FOR PROCEDURE (OUTPATIENT)
Age: 40
End: 2025-01-09

## 2025-01-09 ENCOUNTER — TELEPHONE (OUTPATIENT)
Age: 40
End: 2025-01-09

## 2025-01-09 NOTE — TELEPHONE ENCOUNTER
Called patient to get her scheduled for surgery.Patient is now scheduled for 3/14 at [facility].patient is aware of scheduled procedure and post op

## 2025-01-13 ENCOUNTER — OFFICE VISIT (OUTPATIENT)
Facility: CLINIC | Age: 40
End: 2025-01-13
Payer: MEDICAID

## 2025-01-13 VITALS
RESPIRATION RATE: 18 BRPM | DIASTOLIC BLOOD PRESSURE: 80 MMHG | TEMPERATURE: 98.3 F | BODY MASS INDEX: 36.49 KG/M2 | SYSTOLIC BLOOD PRESSURE: 124 MMHG | OXYGEN SATURATION: 97 % | WEIGHT: 219 LBS | HEIGHT: 65 IN | HEART RATE: 108 BPM

## 2025-01-13 DIAGNOSIS — J02.9 PHARYNGITIS, UNSPECIFIED ETIOLOGY: ICD-10-CM

## 2025-01-13 DIAGNOSIS — I10 BENIGN HYPERTENSION: ICD-10-CM

## 2025-01-13 DIAGNOSIS — F33.2 SEVERE EPISODE OF RECURRENT MAJOR DEPRESSIVE DISORDER, WITHOUT PSYCHOTIC FEATURES (HCC): Primary | ICD-10-CM

## 2025-01-13 DIAGNOSIS — E78.2 MIXED HYPERLIPIDEMIA: ICD-10-CM

## 2025-01-13 DIAGNOSIS — F41.9 ANXIETY: ICD-10-CM

## 2025-01-13 DIAGNOSIS — I10 ESSENTIAL (PRIMARY) HYPERTENSION: ICD-10-CM

## 2025-01-13 DIAGNOSIS — E11.65 CONTROLLED TYPE 2 DIABETES MELLITUS WITH HYPERGLYCEMIA, WITHOUT LONG-TERM CURRENT USE OF INSULIN (HCC): ICD-10-CM

## 2025-01-13 LAB
GROUP A STREP ANTIGEN, POC: NEGATIVE
VALID INTERNAL CONTROL, POC: YES

## 2025-01-13 PROCEDURE — 3079F DIAST BP 80-89 MM HG: CPT | Performed by: FAMILY MEDICINE

## 2025-01-13 PROCEDURE — 99214 OFFICE O/P EST MOD 30 MIN: CPT | Performed by: FAMILY MEDICINE

## 2025-01-13 PROCEDURE — 87880 STREP A ASSAY W/OPTIC: CPT | Performed by: FAMILY MEDICINE

## 2025-01-13 PROCEDURE — 3074F SYST BP LT 130 MM HG: CPT | Performed by: FAMILY MEDICINE

## 2025-01-13 RX ORDER — BUSPIRONE HYDROCHLORIDE 10 MG/1
10 TABLET ORAL 3 TIMES DAILY
Qty: 90 TABLET | Refills: 2 | Status: SHIPPED | OUTPATIENT
Start: 2025-01-13

## 2025-01-13 RX ORDER — METFORMIN HYDROCHLORIDE 500 MG/1
1000 TABLET, EXTENDED RELEASE ORAL 2 TIMES DAILY
Qty: 360 TABLET | Refills: 1 | Status: SHIPPED | OUTPATIENT
Start: 2025-01-13

## 2025-01-13 RX ORDER — LOSARTAN POTASSIUM 25 MG/1
25 TABLET ORAL DAILY
Qty: 90 TABLET | Refills: 1 | Status: SHIPPED | OUTPATIENT
Start: 2025-01-13

## 2025-01-13 RX ORDER — AMLODIPINE BESYLATE 10 MG/1
10 TABLET ORAL EVERY MORNING
Qty: 90 TABLET | Refills: 1 | Status: SHIPPED | OUTPATIENT
Start: 2025-01-13

## 2025-01-13 RX ORDER — QUETIAPINE FUMARATE 25 MG/1
25 TABLET, FILM COATED ORAL
Qty: 90 TABLET | Refills: 0 | Status: SHIPPED | OUTPATIENT
Start: 2025-01-13

## 2025-01-13 RX ORDER — DULOXETIN HYDROCHLORIDE 60 MG/1
60 CAPSULE, DELAYED RELEASE ORAL EVERY MORNING
Qty: 90 CAPSULE | Refills: 1 | Status: SHIPPED | OUTPATIENT
Start: 2025-01-13

## 2025-01-13 RX ORDER — ATORVASTATIN CALCIUM 40 MG/1
40 TABLET, FILM COATED ORAL DAILY
Qty: 90 TABLET | Refills: 1 | Status: SHIPPED | OUTPATIENT
Start: 2025-01-13

## 2025-01-13 SDOH — ECONOMIC STABILITY: FOOD INSECURITY: WITHIN THE PAST 12 MONTHS, THE FOOD YOU BOUGHT JUST DIDN'T LAST AND YOU DIDN'T HAVE MONEY TO GET MORE.: NEVER TRUE

## 2025-01-13 SDOH — ECONOMIC STABILITY: FOOD INSECURITY: WITHIN THE PAST 12 MONTHS, YOU WORRIED THAT YOUR FOOD WOULD RUN OUT BEFORE YOU GOT MONEY TO BUY MORE.: NEVER TRUE

## 2025-01-13 ASSESSMENT — PATIENT HEALTH QUESTIONNAIRE - PHQ9
9. THOUGHTS THAT YOU WOULD BE BETTER OFF DEAD, OR OF HURTING YOURSELF: NOT AT ALL
5. POOR APPETITE OR OVEREATING: NEARLY EVERY DAY
10. IF YOU CHECKED OFF ANY PROBLEMS, HOW DIFFICULT HAVE THESE PROBLEMS MADE IT FOR YOU TO DO YOUR WORK, TAKE CARE OF THINGS AT HOME, OR GET ALONG WITH OTHER PEOPLE: VERY DIFFICULT
SUM OF ALL RESPONSES TO PHQ QUESTIONS 1-9: 19
1. LITTLE INTEREST OR PLEASURE IN DOING THINGS: MORE THAN HALF THE DAYS
6. FEELING BAD ABOUT YOURSELF - OR THAT YOU ARE A FAILURE OR HAVE LET YOURSELF OR YOUR FAMILY DOWN: NEARLY EVERY DAY
7. TROUBLE CONCENTRATING ON THINGS, SUCH AS READING THE NEWSPAPER OR WATCHING TELEVISION: MORE THAN HALF THE DAYS
2. FEELING DOWN, DEPRESSED OR HOPELESS: NEARLY EVERY DAY
SUM OF ALL RESPONSES TO PHQ QUESTIONS 1-9: 19
3. TROUBLE FALLING OR STAYING ASLEEP: NEARLY EVERY DAY
8. MOVING OR SPEAKING SO SLOWLY THAT OTHER PEOPLE COULD HAVE NOTICED. OR THE OPPOSITE, BEING SO FIGETY OR RESTLESS THAT YOU HAVE BEEN MOVING AROUND A LOT MORE THAN USUAL: NOT AT ALL
4. FEELING TIRED OR HAVING LITTLE ENERGY: NEARLY EVERY DAY
SUM OF ALL RESPONSES TO PHQ QUESTIONS 1-9: 19
SUM OF ALL RESPONSES TO PHQ9 QUESTIONS 1 & 2: 5
SUM OF ALL RESPONSES TO PHQ QUESTIONS 1-9: 19

## 2025-01-13 ASSESSMENT — ENCOUNTER SYMPTOMS: SORE THROAT: 1

## 2025-01-13 NOTE — PROGRESS NOTES
\"Have you been to the ER, urgent care clinic since your last visit?  Hospitalized since your last visit?\"    NO    “Have you seen or consulted any other health care providers outside our system since your last visit?”    NO     “Have you had a pap smear?”    NO    Date of last Cervical Cancer screen (HPV or PAP): 9/10/2021       “Have you had a diabetic eye exam?”    NO     Date of last diabetic eye exam: 9/16/2022

## 2025-01-13 NOTE — ASSESSMENT & PLAN NOTE
Continue buspar    Orders:    busPIRone (BUSPAR) 10 MG tablet; Take 1 tablet by mouth 3 times daily
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
## 2025-01-13 NOTE — ASSESSMENT & PLAN NOTE
Slightly improved phq after a month of adding seroquel, I am hesitant to increase due to weight gain, advised her to reach out to counseling, Current treatment plan is effective, no change in therapy , and reviewed medications and side effects in detail    Orders:    QUEtiapine (SEROQUEL) 25 MG tablet; Take 1 tablet by mouth nightly    DULoxetine (CYMBALTA) 60 MG extended release capsule; Take 1 capsule by mouth every morning

## 2025-01-21 DIAGNOSIS — R11.2 NAUSEA AND VOMITING, UNSPECIFIED VOMITING TYPE: ICD-10-CM

## 2025-01-21 RX ORDER — ONDANSETRON 4 MG/1
TABLET, ORALLY DISINTEGRATING ORAL
Qty: 20 TABLET | Refills: 1 | OUTPATIENT
Start: 2025-01-21

## 2025-01-27 ENCOUNTER — OFFICE VISIT (OUTPATIENT)
Facility: CLINIC | Age: 40
End: 2025-01-27
Payer: MEDICAID

## 2025-01-27 VITALS
HEIGHT: 65 IN | OXYGEN SATURATION: 99 % | HEART RATE: 98 BPM | RESPIRATION RATE: 18 BRPM | SYSTOLIC BLOOD PRESSURE: 128 MMHG | TEMPERATURE: 98 F | BODY MASS INDEX: 36.49 KG/M2 | DIASTOLIC BLOOD PRESSURE: 83 MMHG | WEIGHT: 219 LBS

## 2025-01-27 DIAGNOSIS — M79.7 FIBROMYALGIA: ICD-10-CM

## 2025-01-27 DIAGNOSIS — M54.42 ACUTE BILATERAL LOW BACK PAIN WITH LEFT-SIDED SCIATICA: Primary | ICD-10-CM

## 2025-01-27 PROCEDURE — 99214 OFFICE O/P EST MOD 30 MIN: CPT | Performed by: FAMILY MEDICINE

## 2025-01-27 PROCEDURE — 3079F DIAST BP 80-89 MM HG: CPT | Performed by: FAMILY MEDICINE

## 2025-01-27 PROCEDURE — 3074F SYST BP LT 130 MM HG: CPT | Performed by: FAMILY MEDICINE

## 2025-01-27 RX ORDER — PREDNISONE 20 MG/1
TABLET ORAL
COMMUNITY
Start: 2025-01-25 | End: 2025-01-27

## 2025-01-27 RX ORDER — PREDNISONE 20 MG/1
40 TABLET ORAL DAILY
Qty: 14 TABLET | Refills: 0 | Status: SHIPPED | OUTPATIENT
Start: 2025-01-27 | End: 2025-02-03

## 2025-01-27 RX ORDER — PREGABALIN 75 MG/1
75 CAPSULE ORAL 2 TIMES DAILY
Qty: 60 CAPSULE | Refills: 0 | Status: SHIPPED | OUTPATIENT
Start: 2025-01-27 | End: 2025-02-26

## 2025-01-27 ASSESSMENT — ENCOUNTER SYMPTOMS
BACK PAIN: 1
APNEA: 0
CHEST TIGHTNESS: 0

## 2025-01-27 NOTE — PROGRESS NOTES
[facility]    History of Present Illness:   Kayla Mann is a 39 y.o. female with history of HTN, Asthma, DM, Depression, Low back pain  CC: Sciatica pain  History provided by patient and Records    HPI:  Back Pain:  Patient reports severe left lower back pain that has been ongoing for the last 7 days.  Patient does have a history of chronic back pain.  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  - Quality: sharp pain of 10/10 intensity at baseline.   - Frequency: every day  - Duration: continuous  - Radiation: left hip, left thigh, left lower leg, left foot  - Numbness/Decreased sensation: No  - Pain Exacerbations: Contractions pain of 10/10 intensity lasting for hour(s).  - Exacerbating factors: standing, twisting, and Walking  - Alleviating Factors: Hot water helps  - Current Medications tried: Prednisone, Norco  - Previous Back Pain: recurrent self limited episodes of low back pain in the past  - Previous Orthopedic Surgeon: Pain managment with Epidurals x 3 not effective  - Previous Physical Therapy: Yes  - Previous imaging: CT lumbar Spine with L5-S1 Canal stenosis and disc bulge on the left S1 Nerve root  - Patient denies fevers, chills, sweats, weight loss, bowel/bladder incontinence, saddle anesthesia.         Health Maintenance  Health Maintenance Due   Topic Date Due    Pneumococcal 0-64 years Vaccine (1 of 2 - PCV) Never done    Varicella vaccine (1 of 2 - 13+ 2-dose series) Never done    Hepatitis B vaccine (1 of 3 - 19+ 3-dose series) Never done    Diabetic retinal exam  09/16/2023    COVID-19 Vaccine (2 - 2023-24 season) 09/01/2024    Cervical cancer screen  09/10/2024       Past Medical, Family, and Social History:     Current Outpatient Medications on File Prior to Visit   Medication Sig Dispense Refill    QUEtiapine (SEROQUEL) 25 MG tablet Take 1 tablet by mouth nightly 90 tablet 0    metFORMIN (GLUCOPHAGE-XR) 500 MG extended release tablet Take 2

## 2025-01-27 NOTE — PROGRESS NOTES
Chief Complaint   Patient presents with    Back Pain     Left side. Reports [facility] visit 1/25/25. Pain x7 days duration.          \"Have you been to the ER, urgent care clinic since your last visit?  Hospitalized since your last visit?\"    YES- [facility] 1/25/25. Was given 12 tabs Norco 7.5/325mg. Reports she is out of this now.     “Have you seen or consulted any other health care providers outside of [facility] since your last visit?”    NO     “Have you had a pap smear?”    NO                Click Here for Release of Records Request     Health Maintenance Due   Topic Date Due    Pneumococcal 0-64 years Vaccine (1 of 2 - PCV) Never done    Varicella vaccine (1 of 2 - 13+ 2-dose series) Never done    Hepatitis B vaccine (1 of 3 - 19+ 3-dose series) Never done    Diabetic retinal exam  09/16/2023    COVID-19 Vaccine (2 - 2023-24 season) 09/01/2024    Cervical cancer screen  09/10/2024

## 2025-01-29 ENCOUNTER — TELEPHONE (OUTPATIENT)
Facility: CLINIC | Age: 40
End: 2025-01-29

## 2025-01-29 NOTE — TELEPHONE ENCOUNTER
Called patient, notified per Dr. Beckford-      Can you give her a call back and say that with the kind of pain she has it can take a few days for the medications to get the pain reduced consistently.  Is she taking the Lyrica and the Prednisone currently?     Stated yes she is taking both medications however she decided to go to ED to be evaluated. Stated she is currently at [facility] ED.

## 2025-01-29 NOTE — TELEPHONE ENCOUNTER
pregabalin (LYRICA) 75 MG capsul   Pt would like to know how long meds will take to get in her system. Pt would like a call back. Please advise.

## 2025-02-03 ENCOUNTER — OFFICE VISIT (OUTPATIENT)
Facility: CLINIC | Age: 40
End: 2025-02-03
Payer: MEDICAID

## 2025-02-03 VITALS
RESPIRATION RATE: 16 BRPM | OXYGEN SATURATION: 98 % | BODY MASS INDEX: 36.65 KG/M2 | SYSTOLIC BLOOD PRESSURE: 122 MMHG | HEIGHT: 65 IN | DIASTOLIC BLOOD PRESSURE: 86 MMHG | TEMPERATURE: 98.8 F | HEART RATE: 102 BPM | WEIGHT: 220 LBS

## 2025-02-03 DIAGNOSIS — M54.42 ACUTE BILATERAL LOW BACK PAIN WITH LEFT-SIDED SCIATICA: Primary | ICD-10-CM

## 2025-02-03 PROBLEM — M54.30 SCIATICA: Status: ACTIVE | Noted: 2025-01-24

## 2025-02-03 PROCEDURE — 99214 OFFICE O/P EST MOD 30 MIN: CPT | Performed by: FAMILY MEDICINE

## 2025-02-03 PROCEDURE — 3079F DIAST BP 80-89 MM HG: CPT | Performed by: FAMILY MEDICINE

## 2025-02-03 PROCEDURE — 3074F SYST BP LT 130 MM HG: CPT | Performed by: FAMILY MEDICINE

## 2025-02-03 RX ORDER — DICLOFENAC SODIUM 75 MG/1
75 TABLET, DELAYED RELEASE ORAL 2 TIMES DAILY
COMMUNITY
Start: 2025-01-31 | End: 2025-02-03 | Stop reason: ALTCHOICE

## 2025-02-03 RX ORDER — METHOCARBAMOL 500 MG/1
TABLET, FILM COATED ORAL
COMMUNITY
Start: 2025-01-23 | End: 2025-02-03 | Stop reason: ALTCHOICE

## 2025-02-03 RX ORDER — METHYLPREDNISOLONE 4 MG/1
TABLET ORAL
Qty: 1 KIT | Refills: 0 | Status: SHIPPED | OUTPATIENT
Start: 2025-02-03

## 2025-02-03 RX ORDER — KETOROLAC TROMETHAMINE 30 MG/ML
30 INJECTION, SOLUTION INTRAMUSCULAR; INTRAVENOUS ONCE
Status: COMPLETED | OUTPATIENT
Start: 2025-02-03 | End: 2025-02-03

## 2025-02-03 RX ORDER — CYCLOBENZAPRINE HCL 10 MG
TABLET ORAL
COMMUNITY
Start: 2025-01-31

## 2025-02-03 RX ADMIN — KETOROLAC TROMETHAMINE 30 MG: 30 INJECTION, SOLUTION INTRAMUSCULAR; INTRAVENOUS at 11:17

## 2025-02-03 ASSESSMENT — ENCOUNTER SYMPTOMS: BACK PAIN: 1

## 2025-02-03 NOTE — ASSESSMENT & PLAN NOTE
Chronic, not at goal (unstable), changes made today: increase lyricato 75 mg 2 capsules twice daily, stop prednisone and diclofenac and start medrol dose jose.   Continue flexeril.  Advised otc tylenol arthritis 2 tabs every 12 hr, topical diclofenac, lidocaine patch 12 hr on and 12 hr off.  Get MRI and call Dr. Oquendo's office [phone]  Pain clinic - [phone]  I reviewed medications and side effects in detail, and radiology results and schedule of future radiology studies reviewed with patient  Orders:    XR LUMBAR SPINE (2-3 VIEWS); Future    methylPREDNISolone (MEDROL DOSEPACK) 4 MG tablet; 24 mg po on day 1, then decrease by 4 mg/day x 5 days    MRI LUMBAR SPINE WO CONTRAST; Future    ELAINE - Manuelito Butler MD, Pain Medicine, [place]    ketorolac (TORADOL) injection 30 mg  Justification for level of billing, time spent: 35 min with patient, reviewing chart and face to face exam, clinical documentation. Time prior to the visit was spent reviewing external notes results and imaging reports.  As well during the visit, time included evaluating the patient, discussing results and plans with the patient, and coordinating care.  As well, after the visit additional time spent documenting clinical care, interpreting results, and coordinating care.  This time was all spent during the date of service.

## 2025-02-03 NOTE — PROGRESS NOTES
Chief Complaint   Patient presents with    Back Pain     Lower back pain radiating down left leg x15 days, sciatica         \"Have you been to the ER, urgent care clinic since your last visit?  Hospitalized since your last visit?\"    [facility]     “Have you seen or consulted any other health care providers outside of [facility] since your last visit?”    No      “Have you had a pap smear?”    NO    Date of last Cervical Cancer screen (HPV or PAP): 9/10/2021             Click Here for Release of Records Request     Health Maintenance Due   Topic Date Due    Pneumococcal 0-64 years Vaccine (1 of 2 - PCV) Never done    Varicella vaccine (1 of 2 - 13+ 2-dose series) Never done    Hepatitis B vaccine (1 of 3 - 19+ 3-dose series) Never done    Diabetic retinal exam  09/16/2023    COVID-19 Vaccine (2 - 2023-24 season) 09/01/2024    Cervical cancer screen  09/10/2024

## 2025-02-03 NOTE — PATIENT INSTRUCTIONS
Increase lyrica to 75 mg 2 capsules twice daily, stop prednisone and diclofenac and start medrol dose jose. Continue flexeril.  Advised otc tylenol arthritis 2 tabs every 12 hr, topical diclofenac, lidocaine patch 12 hr on and 12 hr off.  Get MRI and call Dr. Oquendo's office [phone]  Pain clinic - [phone]

## 2025-02-03 NOTE — PROGRESS NOTES
[facility]  Chief Complaint   Patient presents with    Back Pain     Lower back pain radiating down left leg x15 days, sciatica. [facility] ED.        History of Present Illness:   Kayla Mann is a 39 y.o. female       HPI:  Here for continued L scatica.Went to [facility] 3 times for LBP. Given diclofenac, flexeril. She says pain radiates down L leg, starts in lower back. Seen here on 1/27 and given prednisone 40 mg x 7 days, lyrica 75 mg bid and referred to ortho    [facility] - given steriod shot.     Pain started after lifting patient from wheelchair to toilet 3 weeks ago.     No numbness in feet, using cane at home.?giving away sensation.   No falls.       Back Pain:  Patient reports severe left lower back pain that has been ongoing for the last 2 weeks. Patient does have a history of chronic back pain.    - Quality: sharp pain of 9/10 intensity at baseline.   - Frequency: every day  - Duration: continuous  - Radiation: left hip, left thigh, left lower leg, left foot  - Numbness/Decreased sensation: No  - Exacerbating factors: standing, twisting, and Walking  - Previous Back Pain: recurrent self limited episodes of low back pain in the past  - Previous Orthopedic Surgeon: Pain managment with Epidurals x 3 not effective  - Previous Physical Therapy: Yes  - Previous imaging: CT lumbar Spine with L5-S1 Canal stenosis and disc bulge on the left S1 Nerve root in 7/22.  - Patient denies fevers, chills, sweats, weight loss, bowel/bladder incontinence, saddle anesthesia.    Estimated Creatinine Clearance: 133 mL/min (based on SCr of 0.68 mg/dL).      Health Maintenance  Health Maintenance Due   Topic Date Due    Pneumococcal 0-64 years Vaccine (1 of 2 - PCV) Never done    Varicella vaccine (1 of 2 - 13+ 2-dose series) Never done    Hepatitis B vaccine (1 of 3 - 19+ 3-dose series) Never done    Diabetic retinal exam  09/16/2023    COVID-19 Vaccine (3 - 2023-24 season) 09/01/2024    Cervical

## 2025-02-11 ENCOUNTER — OFFICE VISIT (OUTPATIENT)
Facility: CLINIC | Age: 40
End: 2025-02-11

## 2025-02-11 VITALS
HEIGHT: 65 IN | SYSTOLIC BLOOD PRESSURE: 134 MMHG | RESPIRATION RATE: 16 BRPM | OXYGEN SATURATION: 95 % | BODY MASS INDEX: 37.65 KG/M2 | WEIGHT: 226 LBS | TEMPERATURE: 97.1 F | DIASTOLIC BLOOD PRESSURE: 88 MMHG | HEART RATE: 105 BPM

## 2025-02-11 DIAGNOSIS — F33.1 MODERATE EPISODE OF RECURRENT MAJOR DEPRESSIVE DISORDER (HCC): ICD-10-CM

## 2025-02-11 DIAGNOSIS — M54.42 ACUTE BILATERAL LOW BACK PAIN WITH LEFT-SIDED SCIATICA: Primary | ICD-10-CM

## 2025-02-11 DIAGNOSIS — M79.7 FIBROMYALGIA: ICD-10-CM

## 2025-02-11 DIAGNOSIS — E11.9 CONTROLLED TYPE 2 DIABETES MELLITUS WITHOUT COMPLICATION, WITHOUT LONG-TERM CURRENT USE OF INSULIN (HCC): ICD-10-CM

## 2025-02-11 RX ORDER — CARBAMAZEPINE 200 MG/1
200 CAPSULE, EXTENDED RELEASE ORAL 2 TIMES DAILY
Qty: 60 CAPSULE | Refills: 3 | Status: SHIPPED | OUTPATIENT
Start: 2025-02-11 | End: 2025-02-13

## 2025-02-11 RX ORDER — KETOROLAC TROMETHAMINE 30 MG/ML
60 INJECTION, SOLUTION INTRAMUSCULAR; INTRAVENOUS ONCE
Status: COMPLETED | OUTPATIENT
Start: 2025-02-11 | End: 2025-02-11

## 2025-02-11 RX ORDER — PREGABALIN 225 MG/1
225 CAPSULE ORAL 2 TIMES DAILY
Qty: 60 CAPSULE | Refills: 0 | Status: SHIPPED | OUTPATIENT
Start: 2025-02-11 | End: 2025-03-13

## 2025-02-11 RX ADMIN — KETOROLAC TROMETHAMINE 60 MG: 30 INJECTION, SOLUTION INTRAMUSCULAR; INTRAVENOUS at 11:48

## 2025-02-11 ASSESSMENT — ENCOUNTER SYMPTOMS: BACK PAIN: 1

## 2025-02-11 NOTE — ASSESSMENT & PLAN NOTE
Orders:    pregabalin (LYRICA) 225 MG capsule; Take 1 capsule by mouth 2 times daily for 30 days. Max Daily Amount: 450 mg    tiZANidine (ZANAFLEX) 4 MG tablet; Take 1 tablet by mouth 3 times daily as needed (spasm)

## 2025-02-11 NOTE — ASSESSMENT & PLAN NOTE
Higher numbers due to recent steriod use, monitor fsbs    Hemoglobin A1C, POC   Date Value Ref Range Status   10/21/2024 8.8 % Final          Justification for level of billing, time spent: 32 min with patient, reviewing chart and face to face exam, clinical documentation. Time prior to the visit was spent reviewing external notes results and imaging reports.  As well during the visit, time included evaluating the patient, discussing results and plans with the patient, and coordinating care.  As well, after the visit additional time spent documenting clinical care, interpreting results, and coordinating care.  This time was all spent during the date of service.

## 2025-02-11 NOTE — ASSESSMENT & PLAN NOTE
Uncontrolled, the following changes in treatment are made increase lyrica, start carbamazepine, zanaflex. Given toradol 60 mg IM here, and I reviewed medications and side effects in detail. Advised that she will need to stop MJ due to controlled substance use.     Orders:    pregabalin (LYRICA) 225 MG capsule; Take 1 capsule by mouth 2 times daily for 30 days. Max Daily Amount: 450 mg    tiZANidine (ZANAFLEX) 4 MG tablet; Take 1 tablet by mouth 3 times daily as needed (spasm)    ketorolac (TORADOL) injection 60 mg    carBAMazepine (CARBATROL) 200 MG extended release capsule; Take 1 capsule by mouth 2 times daily

## 2025-02-11 NOTE — PROGRESS NOTES
Chief Complaint   Patient presents with    Leg Pain    Back Pain        \"Have you been to the ER, urgent care clinic since your last visit?  Hospitalized since your last visit?\"    NO    “Have you seen or consulted any other health care providers outside of [facility] since your last visit?”    NO     “Have you had a pap smear?”    NO    Date of last Cervical Cancer screen (HPV or PAP): 9/10/2021             Click Here for Release of Records Request     Health Maintenance Due   Topic Date Due    Varicella vaccine (1 of 2 - 13+ 2-dose series) Never done    Hepatitis B vaccine (1 of 3 - 19+ 3-dose series) Never done    Pneumococcal 0-49 years Vaccine (1 of 2 - PCV) Never done    Diabetic retinal exam  09/16/2023    COVID-19 Vaccine (3 - 2024-25 season) 09/01/2024    Cervical cancer screen  09/10/2024    Diabetic foot exam  03/07/2025    Diabetic Alb to Cr ratio (uACR) test  03/08/2025       
200 MG extended release capsule; Take 1 capsule by mouth 2 times daily    Fibromyalgia       Orders:    pregabalin (LYRICA) 225 MG capsule; Take 1 capsule by mouth 2 times daily for 30 days. Max Daily Amount: 450 mg    tiZANidine (ZANAFLEX) 4 MG tablet; Take 1 tablet by mouth 3 times daily as needed (spasm)    Moderate episode of recurrent major depressive disorder (HCC)     Start carbamazepine for pain/mood, if it helps hold seroquel at bedtime.        Controlled type 2 diabetes mellitus without complication, without long-term current use of insulin (HCC)    Higher numbers due to recent steriod use , monitor fsbs    Hemoglobin A1C, POC   Date Value Ref Range Status   10/21/2024 8.8 % Final          Justification for level of billing, time spent: 32 min with patient, reviewing chart and face to face exam, clinical documentation. Time prior to the visit was spent reviewing external notes results and imaging reports.  As well during the visit, time included evaluating the patient, discussing results and plans with the patient, and coordinating care.  As well, after the visit additional time spent documenting clinical care, interpreting results, and coordinating care.  This time was all spent during the date of service.  Return in about 4 weeks (around 3/11/2025).   I have discussed the diagnosis with the patient and the intended plan as seen in the above orders.  Social history, medical history, and labs were reviewed.  The patient has received an after-visit summary and questions were answered concerning future plans.  I have discussed medication side effects and warnings with the patient as well. Patient verbalized understanding and accepts plan & risks.        Anjelica Burleson MD  [facility]  02/11/25

## 2025-02-12 ENCOUNTER — TELEPHONE (OUTPATIENT)
Facility: CLINIC | Age: 40
End: 2025-02-12

## 2025-02-12 DIAGNOSIS — M54.42 ACUTE BILATERAL LOW BACK PAIN WITH LEFT-SIDED SCIATICA: Primary | ICD-10-CM

## 2025-02-12 RX ORDER — KETOROLAC TROMETHAMINE 10 MG/1
10 TABLET, FILM COATED ORAL EVERY 6 HOURS PRN
Qty: 20 TABLET | Refills: 0 | Status: SHIPPED | OUTPATIENT
Start: 2025-02-12 | End: 2026-02-12

## 2025-02-12 NOTE — TELEPHONE ENCOUNTER
Patient states carbatrol needs PA. Will initiate.   She is going to call ortho today.   Toradol shot helped yesterday but patient c/o severe pain today.

## 2025-02-13 RX ORDER — CARBAMAZEPINE 200 MG/1
200 TABLET, EXTENDED RELEASE ORAL 2 TIMES DAILY
Qty: 60 TABLET | Refills: 3 | Status: SHIPPED | OUTPATIENT
Start: 2025-02-13

## 2025-02-19 ENCOUNTER — TELEPHONE (OUTPATIENT)
Facility: CLINIC | Age: 40
End: 2025-02-19

## 2025-02-19 NOTE — TELEPHONE ENCOUNTER
Pt called to request a refill on the following medication:    -ketorolac (TORADOL) 10 MG     Pt states she only has 1 pill left and that this medication is the only medication that helps with her pain. Pt has appt scheduled with Dr. Csaas @ [facility] on 3/27.    Please advise...

## 2025-02-27 DIAGNOSIS — R11.2 NAUSEA AND VOMITING, UNSPECIFIED VOMITING TYPE: ICD-10-CM

## 2025-02-27 RX ORDER — ONDANSETRON 4 MG/1
TABLET, ORALLY DISINTEGRATING ORAL
Qty: 20 TABLET | Refills: 1 | Status: SHIPPED | OUTPATIENT
Start: 2025-02-27

## 2025-03-03 ENCOUNTER — TELEPHONE (OUTPATIENT)
Age: 40
End: 2025-03-03

## 2025-03-03 NOTE — TELEPHONE ENCOUNTER
Yue from Pre-ADM testing called and states that she is coming in for Pre-ADM Testing on 03/05/2025.  There is no orders in the chart yet.

## 2025-03-05 ENCOUNTER — HOSPITAL ENCOUNTER (OUTPATIENT)
Facility: HOSPITAL | Age: 40
Discharge: HOME | End: 2025-03-08
Payer: MEDICAID

## 2025-03-05 VITALS
DIASTOLIC BLOOD PRESSURE: 111 MMHG | BODY MASS INDEX: 38.09 KG/M2 | WEIGHT: 223.1 LBS | SYSTOLIC BLOOD PRESSURE: 140 MMHG | TEMPERATURE: 99.1 F | OXYGEN SATURATION: 95 % | HEART RATE: 92 BPM | RESPIRATION RATE: 16 BRPM | HEIGHT: 64 IN

## 2025-03-05 LAB
ANION GAP SERPL CALC-SCNC: 8 MMOL/L (ref 2–12)
BASOPHILS # BLD: 0.08 K/UL (ref 0–0.1)
BASOPHILS NFR BLD: 0.6 % (ref 0–1)
BUN SERPL-MCNC: 11 MG/DL (ref 6–20)
BUN/CREAT SERPL: 17 (ref 12–20)
CALCIUM SERPL-MCNC: 10.4 MG/DL (ref 8.5–10.1)
CHLORIDE SERPL-SCNC: 109 MMOL/L (ref 97–108)
CO2 SERPL-SCNC: 23 MMOL/L (ref 21–32)
CREAT SERPL-MCNC: 0.66 MG/DL (ref 0.55–1.02)
DIFFERENTIAL METHOD BLD: ABNORMAL
EKG ATRIAL RATE: 85 BPM
EKG DIAGNOSIS: NORMAL
EKG P AXIS: 34 DEGREES
EKG P-R INTERVAL: 140 MS
EKG Q-T INTERVAL: 416 MS
EKG QRS DURATION: 148 MS
EKG QTC CALCULATION (BAZETT): 495 MS
EKG R AXIS: 44 DEGREES
EKG T AXIS: 31 DEGREES
EKG VENTRICULAR RATE: 85 BPM
EOSINOPHIL # BLD: 0.31 K/UL (ref 0–0.4)
EOSINOPHIL NFR BLD: 2.1 % (ref 0–7)
ERYTHROCYTE [DISTWIDTH] IN BLOOD BY AUTOMATED COUNT: 13.6 % (ref 11.5–14.5)
GLUCOSE SERPL-MCNC: 259 MG/DL (ref 65–100)
HCT VFR BLD AUTO: 43.4 % (ref 35–47)
HGB BLD-MCNC: 14.5 G/DL (ref 11.5–16)
IMM GRANULOCYTES # BLD AUTO: 0.08 K/UL (ref 0–0.04)
IMM GRANULOCYTES NFR BLD AUTO: 0.6 % (ref 0–0.5)
LYMPHOCYTES # BLD: 3.76 K/UL (ref 0.8–3.5)
LYMPHOCYTES NFR BLD: 25.9 % (ref 12–49)
MCH RBC QN AUTO: 30.5 PG (ref 26–34)
MCHC RBC AUTO-ENTMCNC: 33.4 G/DL (ref 30–36.5)
MCV RBC AUTO: 91.2 FL (ref 80–99)
MONOCYTES # BLD: 0.84 K/UL (ref 0–1)
MONOCYTES NFR BLD: 5.8 % (ref 5–13)
NEUTS SEG # BLD: 9.47 K/UL (ref 1.8–8)
NEUTS SEG NFR BLD: 65 % (ref 32–75)
NRBC # BLD: 0 K/UL (ref 0–0.01)
NRBC BLD-RTO: 0 PER 100 WBC
PLATELET # BLD AUTO: 498 K/UL (ref 150–400)
PMV BLD AUTO: 9.8 FL (ref 8.9–12.9)
POTASSIUM SERPL-SCNC: 4.2 MMOL/L (ref 3.5–5.1)
RBC # BLD AUTO: 4.76 M/UL (ref 3.8–5.2)
SODIUM SERPL-SCNC: 140 MMOL/L (ref 136–145)
WBC # BLD AUTO: 14.5 K/UL (ref 3.6–11)

## 2025-03-05 PROCEDURE — 36415 COLL VENOUS BLD VENIPUNCTURE: CPT

## 2025-03-05 PROCEDURE — 85025 COMPLETE CBC W/AUTO DIFF WBC: CPT

## 2025-03-05 PROCEDURE — 80048 BASIC METABOLIC PNL TOTAL CA: CPT

## 2025-03-05 PROCEDURE — 93005 ELECTROCARDIOGRAM TRACING: CPT | Performed by: NURSE PRACTITIONER

## 2025-03-05 RX ORDER — ACETAMINOPHEN 500 MG
500 TABLET ORAL EVERY 6 HOURS PRN
COMMUNITY

## 2025-03-05 NOTE — PERIOP NOTE
Patient noted to have elevated BP at PAT appointment. Reports she has taken her medication and denies any symptoms of headache, chest pain, sob, blurred vision.  Patient instructed to monitor her BP at home daily until Chet 3/9 and call PAT with her readings on Monday 3/10. Pt reports she will also notify her PCP of her elevated BP.  Patient provided with HTN education material and warning signs to seek care in ED.

## 2025-03-05 NOTE — DISCHARGE INSTRUCTIONS
Medications  To  STOP      7 days before surgery Non-Steroidal anti-inflammatory Drugs (NSAID's): for example, Ibuprofen (Advil, Motrin), Naproxen (Aleve).  Aspirin, if taking for pain/ prevention.   Herbal supplements, vitamins, and fish oil.  Other:     Blood  Thinners If you take  Aspirin, Plavix, Coumadin, or any blood-thinning or anti-blood clot medicine, talk to the doctor who prescribed the medications for pre-operative instructions.   Bathing Clothing  Jewelry  Valuables     If you shower the morning of surgery, please do not apply anything to your skin (lotions, powders, deodorant, or makeup, especially mascara).  Do not shave or trim anywhere 24 hours before surgery.  Wear your hair loose or down; no pony-tails, buns, or metal hair clips.  Wear loose, comfortable, clean clothes.  Wear glasses instead of contacts.  Bring container for any hearing aides/ dentures (they will need to be removed).  Leave money, valuables, and jewelry, including body piercings, at home.     Going Home - or Spending the Night   SAME-DAY SURGERY: You must have a responsible adult drive you home and stay with you 24 hours after surgery.    ADMITS: If your doctor is keeping you in the hospital after surgery, leave personal belongings/luggage in your car until you have a hospital room number.       Special Instructions   Bring completed Medication Reconciliation Report Sheet with you on day of surgery.    Please check your blood pressure daily now until Chet 3/9. Please call us with results on Monday 3/10 at [phone].    If your physical condition changes (like a fever, cold, flu, etc.) call your surgeon.  If a situation occurs and you are delayed the day of surgery, call or [phone].               Follow all instructions so your surgery won't be cancelled. Please be on time.   Home medication(s) reviewed and verified verbally with list during PAT appointment.

## 2025-03-06 ENCOUNTER — OFFICE VISIT (OUTPATIENT)
Facility: CLINIC | Age: 40
End: 2025-03-06
Payer: MEDICAID

## 2025-03-06 VITALS
DIASTOLIC BLOOD PRESSURE: 90 MMHG | BODY MASS INDEX: 37.73 KG/M2 | RESPIRATION RATE: 16 BRPM | WEIGHT: 221 LBS | SYSTOLIC BLOOD PRESSURE: 138 MMHG | OXYGEN SATURATION: 98 % | TEMPERATURE: 97.6 F | HEIGHT: 64 IN | HEART RATE: 99 BPM

## 2025-03-06 DIAGNOSIS — E11.65 CONTROLLED TYPE 2 DIABETES MELLITUS WITH HYPERGLYCEMIA, WITHOUT LONG-TERM CURRENT USE OF INSULIN (HCC): ICD-10-CM

## 2025-03-06 DIAGNOSIS — I10 ESSENTIAL (PRIMARY) HYPERTENSION: ICD-10-CM

## 2025-03-06 DIAGNOSIS — Z01.810 PREOP CARDIOVASCULAR EXAM: ICD-10-CM

## 2025-03-06 DIAGNOSIS — K13.0 LIP LESION: Primary | ICD-10-CM

## 2025-03-06 LAB
EST. AVERAGE GLUCOSE BLD GHB EST-MCNC: 237 MG/DL
HBA1C MFR BLD: 9.9 % (ref 4–5.6)

## 2025-03-06 PROCEDURE — 3075F SYST BP GE 130 - 139MM HG: CPT | Performed by: FAMILY MEDICINE

## 2025-03-06 PROCEDURE — 3080F DIAST BP >= 90 MM HG: CPT | Performed by: FAMILY MEDICINE

## 2025-03-06 PROCEDURE — 99214 OFFICE O/P EST MOD 30 MIN: CPT | Performed by: FAMILY MEDICINE

## 2025-03-06 RX ORDER — LOSARTAN POTASSIUM 50 MG/1
50 TABLET ORAL DAILY
Qty: 90 TABLET | Refills: 1 | Status: SHIPPED | OUTPATIENT
Start: 2025-03-06

## 2025-03-06 ASSESSMENT — ENCOUNTER SYMPTOMS: BACK PAIN: 1

## 2025-03-06 NOTE — PROGRESS NOTES
Chief Complaint   Patient presents with    Hypertension     160/100 at home this morning, hypertension the past few days         \"Have you been to the ER, urgent care clinic since your last visit?  Hospitalized since your last visit?\"    NO    “Have you seen or consulted any other health care providers outside of [facility] since your last visit?”    [facility]25     “Have you had a pap smear?”    NO    Date of last Cervical Cancer screen (HPV or PAP): 9/10/2021             Click Here for Release of Records Request     Health Maintenance Due   Topic Date Due    Varicella vaccine (1 of 2 - 13+ 2-dose series) Never done    Hepatitis B vaccine (1 of 3 - 19+ 3-dose series) Never done    Pneumococcal 0-49 years Vaccine (1 of 2 - PCV) Never done    Diabetic retinal exam  09/16/2023    COVID-19 Vaccine (3 - 2024-25 season) 09/01/2024    Cervical cancer screen  09/10/2024    Diabetic foot exam  03/07/2025    Diabetic Alb to Cr ratio (uACR) test  03/08/2025       
Adjustable Dose Pre-filled Pen Syringe 3    Insulin Pen Needle ([employer] PEN NEEDLES) 31G X 6 MM MISC 1 each by Does not apply route daily 100 each 3    ONETOUCH ULTRA strip CHECK BLOOD SUGAR DAILY' 100 strip 3    albuterol sulfate HFA (PROVENTIL;VENTOLIN;PROAIR) 108 (90 Base) MCG/ACT inhaler ProAir HFA 90 mcg/actuation aerosol inhaler      ketorolac (TORADOL) 10 MG tablet Take 1 tablet by mouth every 6 hours as needed for Pain No nsaids with this med (advil or aleve) (Patient not taking: Reported on 3/5/2025) 20 tablet 0    QUEtiapine (SEROQUEL) 25 MG tablet Take 1 tablet by mouth nightly (Patient not taking: Reported on 3/5/2025) 90 tablet 0     No current facility-administered medications on file prior to visit.       Patient Active Problem List   Diagnosis    Depression    Steatosis of liver    Presence of intrauterine contraceptive device    Posttraumatic stress disorder    Migraine without aura    Low back pain    Hiatal hernia    Fibromyalgia    Asthma    Anxiety    Cannabis abuse    Benign hypertension    Gastroparesis    Controlled type 2 diabetes mellitus without complication, without long-term current use of insulin (HCC)    Cyclic vomiting syndrome    Morbid obesity    Ureterolithiasis    Abnormal cervical Papanicolaou smear    Sciatica       Social History     Socioeconomic History    Marital status: Single     Spouse name: None    Number of children: None    Years of education: None    Highest education level: None   Tobacco Use    Smoking status: Former     Current packs/day: 0.00     Average packs/day: 0.3 packs/day for 19.7 years (4.9 ttl pk-yrs)     Types: Cigarettes     Start date:      Quit date: 2024     Years since quittin.5    Smokeless tobacco: Never   Vaping Use    Vaping status: Never Used   Substance and Sexual Activity    Alcohol use: Never    Drug use: Yes     Types: Marijuana (Weed)     Comment: daily     Social Determinants of Health     Financial Resource Strain: Low Risk  
yet to return unexpectedly dictate otherwise. The patient is in my opinion stable to have the surgery with any method of anesthesia deemed appropriate by anesthesiologist. A copy of todays note and labs and studies will be sent to the treating surgeon for his evaluation before the surgery. F/U with us as previously planned for regular check ups on chronic medical problems..    Return in about 7 weeks (around 4/24/2025) for DM.  Justification for level of billing, time spent:  35 min with patient, reviewing chart and face to face exam, clinical documentation. Time prior to the visit was spent reviewing external notes results and imaging reports.  As well during the visit, time included evaluating the patient, discussing results and plans with the patient, and coordinating care.  As well, after the visit additional time spent documenting clinical care, interpreting results, and coordinating care.  This time was all spent during the date of service.    I have discussed the aforementioned diagnoses and plan with the patient in detail. I have provided information in person and/or in AVS. All questions answered prior to discharge.      Signed By:  Anjelica Burleson MD

## 2025-03-07 DIAGNOSIS — E11.9 CONTROLLED TYPE 2 DIABETES MELLITUS WITHOUT COMPLICATION, WITH LONG-TERM CURRENT USE OF INSULIN (HCC): ICD-10-CM

## 2025-03-07 DIAGNOSIS — Z79.4 CONTROLLED TYPE 2 DIABETES MELLITUS WITHOUT COMPLICATION, WITH LONG-TERM CURRENT USE OF INSULIN (HCC): ICD-10-CM

## 2025-03-07 RX ORDER — INSULIN GLARGINE 100 [IU]/ML
INJECTION, SOLUTION SUBCUTANEOUS
Qty: 5 ADJUSTABLE DOSE PRE-FILLED PEN SYRINGE | Refills: 3 | Status: SHIPPED | OUTPATIENT
Start: 2025-03-07

## 2025-03-10 ENCOUNTER — TELEPHONE (OUTPATIENT)
Facility: CLINIC | Age: 40
End: 2025-03-10

## 2025-03-10 DIAGNOSIS — I10 ESSENTIAL (PRIMARY) HYPERTENSION: ICD-10-CM

## 2025-03-10 RX ORDER — LOSARTAN POTASSIUM 100 MG/1
100 TABLET ORAL DAILY
Qty: 90 TABLET | Refills: 1 | Status: SHIPPED | OUTPATIENT
Start: 2025-03-10

## 2025-03-10 NOTE — TELEPHONE ENCOUNTER
Call to pt. Per Dr Burleson: A1c is much worse. Increase lantus to 20 units qhs and call with readings next week.     Pt verbalized understanding. Pt stated that her BP is high. The [facility] nurse check it a couple times and it is 150/100. Pt states she has a procedure coming up on the 14th and her BP has to go down prior to that or they will not do the surgery. Pt states she has to have it done because her mouth hurts so bad. She stated that is probably why her sugar is up because she can only eat carbs/potatoes right now because of it.

## 2025-03-11 NOTE — TELEPHONE ENCOUNTER
Pt called back and have been notified of previous message. Pt states she is going to finish the rest of the losartan she just got first. Pt was made aware pcp sent Rx for 100 mg to [facility]. Please advise.

## 2025-03-11 NOTE — TELEPHONE ENCOUNTER
Call to pt, no answer, message left.     Please make pt aware per Dr Burleson:     Increase losartan to 100 mg daily.

## 2025-03-12 ENCOUNTER — TELEPHONE (OUTPATIENT)
Age: 40
End: 2025-03-12

## 2025-03-12 NOTE — TELEPHONE ENCOUNTER
Called and spoke with patient regarding HTN as well as elevated A1C    Discussed in particular glucose control can impact wound healing and we do have increased risk for wound break down or infection post operatively    She notes that Dr Burleson has made both BP medication adjustments as well as adjusted her lantus, and she does check both at home    She would like to move forward understanding risks related to wounds afterward    Noris Sykes MD   [facility]  [address] [address]  Office Phone: [phone]

## 2025-03-13 ENCOUNTER — ANESTHESIA EVENT (OUTPATIENT)
Facility: HOSPITAL | Age: 40
End: 2025-03-13
Payer: MEDICAID

## 2025-03-13 NOTE — PERIOP NOTE
Hello,     You are scheduled to have surgery tomorrow at [facility].     We would like for you to arrive at  0815 am  We are located on the second floor, [address]. You will check-in at the registration desk located outside the elevators on the second floor prior to proceeding to [address].  Remember nothing to eat or drink after midnight. If you need to take medications the morning of surgery, please take with a few sips of water.   Wear loose, comfortable clothing and leave all your jewelry at home.   You may bring your cell phone with you.  One family member will be allowed in the pre-op area once you are dressed and your IV has been started.   You will need someone to drive you home and be with you for 24 hours post-anesthesia.     We look forward to seeing you! Call [phone] for questions after hours and [phone] between 5:30AM and 6PM.     Thanks!    [facility] PREOP TEAM

## 2025-03-14 ENCOUNTER — ANESTHESIA (OUTPATIENT)
Facility: HOSPITAL | Age: 40
End: 2025-03-14
Payer: MEDICAID

## 2025-03-14 ENCOUNTER — HOSPITAL ENCOUNTER (OUTPATIENT)
Facility: HOSPITAL | Age: 40
Setting detail: OUTPATIENT SURGERY
Discharge: HOME | End: 2025-03-14
Attending: OTOLARYNGOLOGY | Admitting: OTOLARYNGOLOGY
Payer: MEDICAID

## 2025-03-14 VITALS
OXYGEN SATURATION: 93 % | WEIGHT: 224.43 LBS | SYSTOLIC BLOOD PRESSURE: 164 MMHG | HEIGHT: 65 IN | TEMPERATURE: 97.7 F | BODY MASS INDEX: 37.39 KG/M2 | RESPIRATION RATE: 12 BRPM | HEART RATE: 106 BPM | DIASTOLIC BLOOD PRESSURE: 99 MMHG

## 2025-03-14 DIAGNOSIS — K13.0 LIP LESION: Primary | ICD-10-CM

## 2025-03-14 LAB
GLUCOSE BLD STRIP.AUTO-MCNC: 254 MG/DL (ref 65–117)
GLUCOSE BLD STRIP.AUTO-MCNC: 322 MG/DL (ref 65–117)
HCG UR QL: NEGATIVE
SERVICE CMNT-IMP: ABNORMAL
SERVICE CMNT-IMP: ABNORMAL

## 2025-03-14 PROCEDURE — 3600000012 HC SURGERY LEVEL 2 ADDTL 15MIN: Performed by: OTOLARYNGOLOGY

## 2025-03-14 PROCEDURE — 88305 TISSUE EXAM BY PATHOLOGIST: CPT

## 2025-03-14 PROCEDURE — 3700000000 HC ANESTHESIA ATTENDED CARE: Performed by: OTOLARYNGOLOGY

## 2025-03-14 PROCEDURE — 3700000001 HC ADD 15 MINUTES (ANESTHESIA): Performed by: OTOLARYNGOLOGY

## 2025-03-14 PROCEDURE — 2709999900 HC NON-CHARGEABLE SUPPLY: Performed by: OTOLARYNGOLOGY

## 2025-03-14 PROCEDURE — 82962 GLUCOSE BLOOD TEST: CPT

## 2025-03-14 PROCEDURE — 3600000002 HC SURGERY LEVEL 2 BASE: Performed by: OTOLARYNGOLOGY

## 2025-03-14 PROCEDURE — 2580000003 HC RX 258: Performed by: ANESTHESIOLOGY

## 2025-03-14 PROCEDURE — 7100000000 HC PACU RECOVERY - FIRST 15 MIN: Performed by: OTOLARYNGOLOGY

## 2025-03-14 PROCEDURE — 88312 SPECIAL STAINS GROUP 1: CPT

## 2025-03-14 PROCEDURE — 6370000000 HC RX 637 (ALT 250 FOR IP): Performed by: ANESTHESIOLOGY

## 2025-03-14 PROCEDURE — 6360000002 HC RX W HCPCS: Performed by: ANESTHESIOLOGY

## 2025-03-14 PROCEDURE — 6360000002 HC RX W HCPCS: Performed by: OTOLARYNGOLOGY

## 2025-03-14 PROCEDURE — 7100000011 HC PHASE II RECOVERY - ADDTL 15 MIN: Performed by: OTOLARYNGOLOGY

## 2025-03-14 PROCEDURE — 6360000002 HC RX W HCPCS: Performed by: NURSE ANESTHETIST, CERTIFIED REGISTERED

## 2025-03-14 PROCEDURE — 7100000001 HC PACU RECOVERY - ADDTL 15 MIN: Performed by: OTOLARYNGOLOGY

## 2025-03-14 PROCEDURE — 7100000010 HC PHASE II RECOVERY - FIRST 15 MIN: Performed by: OTOLARYNGOLOGY

## 2025-03-14 PROCEDURE — 6370000000 HC RX 637 (ALT 250 FOR IP): Performed by: NURSE ANESTHETIST, CERTIFIED REGISTERED

## 2025-03-14 PROCEDURE — 88342 IMHCHEM/IMCYTCHM 1ST ANTB: CPT

## 2025-03-14 PROCEDURE — 2500000003 HC RX 250 WO HCPCS: Performed by: NURSE ANESTHETIST, CERTIFIED REGISTERED

## 2025-03-14 PROCEDURE — 81025 URINE PREGNANCY TEST: CPT

## 2025-03-14 PROCEDURE — 11441 EXC FACE-MM B9+MARG 0.6-1 CM: CPT | Performed by: OTOLARYNGOLOGY

## 2025-03-14 RX ORDER — MIDAZOLAM HYDROCHLORIDE 2 MG/2ML
2 INJECTION, SOLUTION INTRAMUSCULAR; INTRAVENOUS
Status: DISCONTINUED | OUTPATIENT
Start: 2025-03-14 | End: 2025-03-14 | Stop reason: HOSPADM

## 2025-03-14 RX ORDER — ONDANSETRON 2 MG/ML
INJECTION INTRAMUSCULAR; INTRAVENOUS
Status: DISCONTINUED | OUTPATIENT
Start: 2025-03-14 | End: 2025-03-14 | Stop reason: SDUPTHER

## 2025-03-14 RX ORDER — FENTANYL CITRATE 50 UG/ML
100 INJECTION, SOLUTION INTRAMUSCULAR; INTRAVENOUS
Status: DISCONTINUED | OUTPATIENT
Start: 2025-03-14 | End: 2025-03-14 | Stop reason: HOSPADM

## 2025-03-14 RX ORDER — FAMOTIDINE 10 MG/ML
INJECTION, SOLUTION INTRAVENOUS
Status: DISCONTINUED | OUTPATIENT
Start: 2025-03-14 | End: 2025-03-14 | Stop reason: SDUPTHER

## 2025-03-14 RX ORDER — HYDROCODONE BITARTRATE AND ACETAMINOPHEN 5; 325 MG/1; MG/1
1 TABLET ORAL EVERY 4 HOURS PRN
Qty: 8 TABLET | Refills: 0 | Status: SHIPPED | OUTPATIENT
Start: 2025-03-14 | End: 2025-03-17

## 2025-03-14 RX ORDER — NALOXONE HYDROCHLORIDE 0.4 MG/ML
INJECTION, SOLUTION INTRAMUSCULAR; INTRAVENOUS; SUBCUTANEOUS PRN
Status: DISCONTINUED | OUTPATIENT
Start: 2025-03-14 | End: 2025-03-14 | Stop reason: HOSPADM

## 2025-03-14 RX ORDER — ROCURONIUM BROMIDE 10 MG/ML
INJECTION, SOLUTION INTRAVENOUS
Status: DISCONTINUED | OUTPATIENT
Start: 2025-03-14 | End: 2025-03-14 | Stop reason: SDUPTHER

## 2025-03-14 RX ORDER — LIDOCAINE HYDROCHLORIDE AND EPINEPHRINE 10; 10 MG/ML; UG/ML
INJECTION, SOLUTION INFILTRATION; PERINEURAL PRN
Status: DISCONTINUED | OUTPATIENT
Start: 2025-03-14 | End: 2025-03-14 | Stop reason: HOSPADM

## 2025-03-14 RX ORDER — SODIUM CHLORIDE 9 MG/ML
INJECTION, SOLUTION INTRAVENOUS CONTINUOUS
Status: DISCONTINUED | OUTPATIENT
Start: 2025-03-14 | End: 2025-03-14 | Stop reason: HOSPADM

## 2025-03-14 RX ORDER — MIDAZOLAM HYDROCHLORIDE 1 MG/ML
INJECTION, SOLUTION INTRAMUSCULAR; INTRAVENOUS
Status: DISCONTINUED | OUTPATIENT
Start: 2025-03-14 | End: 2025-03-14 | Stop reason: SDUPTHER

## 2025-03-14 RX ORDER — DIPHENHYDRAMINE HYDROCHLORIDE 50 MG/ML
12.5 INJECTION, SOLUTION INTRAMUSCULAR; INTRAVENOUS
Status: DISCONTINUED | OUTPATIENT
Start: 2025-03-14 | End: 2025-03-14 | Stop reason: HOSPADM

## 2025-03-14 RX ORDER — SUCCINYLCHOLINE/SOD CL,ISO/PF 100 MG/5ML
SYRINGE (ML) INTRAVENOUS
Status: DISCONTINUED | OUTPATIENT
Start: 2025-03-14 | End: 2025-03-14 | Stop reason: SDUPTHER

## 2025-03-14 RX ORDER — SODIUM CHLORIDE, SODIUM LACTATE, POTASSIUM CHLORIDE, CALCIUM CHLORIDE 600; 310; 30; 20 MG/100ML; MG/100ML; MG/100ML; MG/100ML
INJECTION, SOLUTION INTRAVENOUS CONTINUOUS
Status: DISCONTINUED | OUTPATIENT
Start: 2025-03-14 | End: 2025-03-14 | Stop reason: HOSPADM

## 2025-03-14 RX ORDER — PROPOFOL 10 MG/ML
INJECTION, EMULSION INTRAVENOUS
Status: DISCONTINUED | OUTPATIENT
Start: 2025-03-14 | End: 2025-03-14 | Stop reason: SDUPTHER

## 2025-03-14 RX ORDER — ONDANSETRON 2 MG/ML
4 INJECTION INTRAMUSCULAR; INTRAVENOUS
Status: DISCONTINUED | OUTPATIENT
Start: 2025-03-14 | End: 2025-03-14 | Stop reason: HOSPADM

## 2025-03-14 RX ORDER — LIDOCAINE HYDROCHLORIDE 10 MG/ML
1 INJECTION, SOLUTION EPIDURAL; INFILTRATION; INTRACAUDAL; PERINEURAL
Status: DISCONTINUED | OUTPATIENT
Start: 2025-03-14 | End: 2025-03-14 | Stop reason: HOSPADM

## 2025-03-14 RX ORDER — LIDOCAINE HYDROCHLORIDE 20 MG/ML
INJECTION, SOLUTION EPIDURAL; INFILTRATION; INTRACAUDAL; PERINEURAL
Status: DISCONTINUED | OUTPATIENT
Start: 2025-03-14 | End: 2025-03-14 | Stop reason: SDUPTHER

## 2025-03-14 RX ORDER — CEFAZOLIN SODIUM 1 G/3ML
INJECTION, POWDER, FOR SOLUTION INTRAMUSCULAR; INTRAVENOUS
Status: DISCONTINUED | OUTPATIENT
Start: 2025-03-14 | End: 2025-03-14 | Stop reason: SDUPTHER

## 2025-03-14 RX ORDER — ACETAMINOPHEN 325 MG/1
975 TABLET ORAL ONCE
Status: COMPLETED | OUTPATIENT
Start: 2025-03-14 | End: 2025-03-14

## 2025-03-14 RX ORDER — FENTANYL CITRATE 50 UG/ML
INJECTION, SOLUTION INTRAMUSCULAR; INTRAVENOUS
Status: DISCONTINUED | OUTPATIENT
Start: 2025-03-14 | End: 2025-03-14 | Stop reason: SDUPTHER

## 2025-03-14 RX ORDER — ALBUTEROL SULFATE 90 UG/1
INHALANT RESPIRATORY (INHALATION)
Status: DISCONTINUED | OUTPATIENT
Start: 2025-03-14 | End: 2025-03-14 | Stop reason: SDUPTHER

## 2025-03-14 RX ADMIN — LIDOCAINE HYDROCHLORIDE 100 MG: 20 INJECTION, SOLUTION EPIDURAL; INFILTRATION; INTRACAUDAL; PERINEURAL at 10:40

## 2025-03-14 RX ADMIN — PROPOFOL 100 MG: 10 INJECTION, EMULSION INTRAVENOUS at 11:08

## 2025-03-14 RX ADMIN — ALBUTEROL SULFATE 6 PUFF: 90 AEROSOL, METERED RESPIRATORY (INHALATION) at 11:07

## 2025-03-14 RX ADMIN — CEFAZOLIN SODIUM 2 G: 1 POWDER, FOR SOLUTION INTRAMUSCULAR; INTRAVENOUS at 10:51

## 2025-03-14 RX ADMIN — Medication 140 MG: at 10:42

## 2025-03-14 RX ADMIN — PROPOFOL 100 MG: 10 INJECTION, EMULSION INTRAVENOUS at 10:48

## 2025-03-14 RX ADMIN — FENTANYL CITRATE 100 MCG: 50 INJECTION, SOLUTION INTRAMUSCULAR; INTRAVENOUS at 10:40

## 2025-03-14 RX ADMIN — ONDANSETRON 4 MG: 2 INJECTION, SOLUTION INTRAMUSCULAR; INTRAVENOUS at 10:34

## 2025-03-14 RX ADMIN — MIDAZOLAM HYDROCHLORIDE 2 MG: 1 INJECTION, SOLUTION INTRAMUSCULAR; INTRAVENOUS at 10:34

## 2025-03-14 RX ADMIN — PROPOFOL 200 MG: 10 INJECTION, EMULSION INTRAVENOUS at 10:42

## 2025-03-14 RX ADMIN — ACETAMINOPHEN 975 MG: 325 TABLET ORAL at 08:39

## 2025-03-14 RX ADMIN — FAMOTIDINE 20 MG: 10 INJECTION, SOLUTION INTRAVENOUS at 10:34

## 2025-03-14 RX ADMIN — HYDROMORPHONE HYDROCHLORIDE 0.5 MG: 1 INJECTION, SOLUTION INTRAMUSCULAR; INTRAVENOUS; SUBCUTANEOUS at 11:54

## 2025-03-14 RX ADMIN — ROCURONIUM BROMIDE 5 MG: 10 INJECTION, SOLUTION INTRAVENOUS at 10:40

## 2025-03-14 RX ADMIN — SODIUM CHLORIDE: 9 INJECTION, SOLUTION INTRAVENOUS at 08:41

## 2025-03-14 ASSESSMENT — PAIN DESCRIPTION - ORIENTATION: ORIENTATION: RIGHT

## 2025-03-14 ASSESSMENT — PAIN SCALES - GENERAL: PAINLEVEL_OUTOF10: 7

## 2025-03-14 ASSESSMENT — PAIN - FUNCTIONAL ASSESSMENT
PAIN_FUNCTIONAL_ASSESSMENT: NONE - DENIES PAIN
PAIN_FUNCTIONAL_ASSESSMENT: NONE - DENIES PAIN

## 2025-03-14 ASSESSMENT — PAIN DESCRIPTION - LOCATION: LOCATION: FACE

## 2025-03-14 NOTE — DISCHARGE INSTRUCTIONS
Try and avoid stretching or manipulating your lip  Apply vaseline or aquaphor to the lips twice daily or as needed  You may shower normally  You may resume a regular diet, but start with soft foods first, to avoid excess opening of your mouth especially the first few days   Ice can be helpful for pain or swelling    DISCHARGE SUMMARY from your Nurse      PATIENT INSTRUCTIONS    After general anesthesia or intravenous sedation, for 24 hours or while taking prescription Narcotics:  Limit your activities  Do not drive and operate hazardous machinery  Do not make important personal or business decisions  Do  not drink alcoholic beverages  If you have not urinated within 8 hours after discharge, please contact your surgeon on call.    Report the following to your surgeon:  Excessive pain, swelling, redness or odor of or around the surgical area  Temperature over 100.5  Nausea and vomiting lasting longer than 4 hours or if unable to take medications  Any signs of decreased circulation or nerve impairment to extremity: change in color, persistent  numbness, tingling, coldness or increase pain  Any questions      GOOD HELP TO FIGHT AN INFECTION  Here are a few tip to help reduce the chance of getting an infection after surgery:  Wash Your Hands  Good handwashing is the most important thing you and your caregiver can do.  Wash before and after caring for any wounds.  Dry your hand with a clean towel.  Wash with soap and water for at least 20 seconds. A TIP: sing the \"Happy Birthday\" song through one time while washing to help with the timing.  Use a hand  in between washings.    Shower  When your surgeon says it is OK to take a shower, use a new bar of antibacterial soap (if that is what you use, and keep that bar of soap ONLY for your use), or antibacterial body wash.  Use a clean wash cloth or sponge when you bathe.  Dry off with a clean towel  after every bath - be careful around any wounds, skin staples, sutures  or 5 pounds in a week, increased swelling in your hands or feet or shortness of breath while lying flat in bed.  Please call your doctor as soon as you notice any of these symptoms; do not wait until your next office visit.      Recognize signs and symptoms of STROKE:  F -  Face looks uneven  A -  Arms unable to move or move evenly  S -  Speech slurred or non-existent  T -  Time-call 911 as soon as signs and symptoms begin-DO NOT go          back to bed or wait to see if you get better-TIME IS BRAIN.      Warning Signs of HEART ATTACK   Call 911 if you have these symptoms:    Chest discomfort. Most heart attacks involve discomfort in the center of the chest that lasts more than a few minutes, or that goes away and comes back. It can feel like uncomfortable pressure, squeezing, fullness, or pain.  Discomfort in other areas of the upper body. Symptoms can include pain or discomfort in one or both arms, the back, neck, jaw, or stomach.  Shortness of breath with or without chest discomfort.  Other signs may include breaking out in a cold sweat, nausea, or lightheadedness.    Don't wait more than five minutes to call 911 - MINUTES MATTER! Fast action can save your life. Calling 911 is almost always the fastest way to get lifesaving treatment. Emergency Medical Services staff can begin treatment when they arrive -- up to an hour sooner than if someone gets to the hospital by car.       CONTENTS FOUND IN YOUR DISCHARGE ENVELOPE:  [x]     Surgeon and Hospital Discharge Instructions

## 2025-03-14 NOTE — H&P
Otolaryngology-Head and Neck Surgery  H&P    Patient: Kayla Mann  YOB: 1985  MRN: [id number]  Date of Service: 3/14/2025    Chief Complaint:   No chief complaint on file.      Interval hx 3/14/2025  No changes, seen in preop     History of Present Illness: Kayla Mann is a 39 y.o. female who presents today for discussion of lesion on her lip    Present for the last few months  Gradually getting bigger  Bothersome, finds herself manipulating the lesion    Bleeds only if she interferes with it    Otherwise nontender    No oral appliance, dental device  No chewing tobacco    Past Medical History:  Past Medical History:   Diagnosis Date    Abnormal cervical Papanicolaou smear 2019    Arthritis     RA    Asthma     inhaler PRN    Cyclic vomiting syndrome     Depression 2021    Epilepsy (HCC)     2014    Essential hypertension     Fatty liver     fatty liver    Fibromyalgia     Gastroparesis     Generalized convulsive epilepsy (HCC) 2022    Heart abnormality     mild heart murmur no medications required    Herpes simplex virus (HSV) infection     History of anemia     receiving IV iron last dose in 2020    History of kidney stones     Infertility, female     Low back pain     Obstructive sleep apnea 2022    resolved after weight loss    Polycystic disease, ovaries     PONV (postoperative nausea and vomiting)     Posttraumatic stress disorder 2022    Type 2 diabetes mellitus (HCC)        Past Surgical History:   Past Surgical History:   Procedure Laterality Date    APPENDECTOMY       SECTION      CHOLECYSTECTOMY      COLONOSCOPY      CT ABSCESS DRAINAGE SOFT TISSUE  2021    CT DRAIN SOFT TISSUE ABSCESS 3/8/2021 [facility] RAD CT    GASTRIC BYPASS,OBESE<150CM VISHAL-EN-Y      pyloraplasty     INCISION AND DRAINAGE OF WOUND      post     TONSILLECTOMY      UPPER GASTROINTESTINAL ENDOSCOPY      WISDOM TOOTH EXTRACTION         Medications:  Turbinates without hypertrophy.  Diffuse congestion and clear rhinitis  Oral Cavity / Oropharynx: No trismus. Mucosa pink and moist.  2 separate papillomatous appearing lesions, involving follow-up commissure, one of the lesions is right at the junction of her red and white lip at the commissure.  Tongue is midline and mobile. Palate elevates symmetrically. Uvula midline. Tonsils unremarkable. Base of tongue soft. Floor of mouth soft.   Neck: Supple. No adenopathy. Thyroid unremarkable. Palpable laryngeal landmarks. Full neck range of motion   Neurologic: CN II - XI intact. Normal gait  Chest CTAB  Cardiac RRR    Assessment and Plan:   Lip lesions  -  overall lesion more suspicious for probably a papilloma  -Discussed that though they are small, location slightly complex as this is right at the oral commissure and the margin of her red and white blood  -Discussed role of excision  -Discussed in office, versus with sedation.  She does have difficulty tolerating procedures and prefers to move forward with anesthesia  - RBA reviewed including risk of bleeding, infection, scarring, need for repeat or revision procedure, anesthetic risk    The patient was instructed to return to clinic if no improvement or progression of symptoms. Signs to watch out for reviewed.      Noris Sykes MD   [facility]NT [facility]  [address]  [place] [address]  Office Phone: [phone]

## 2025-03-14 NOTE — ANESTHESIA PRE PROCEDURE
GASTROINTESTINAL ENDOSCOPY     • WISDOM TOOTH EXTRACTION         Social History:    Social History     Tobacco Use   • Smoking status: Former     Current packs/day: 0.00     Average packs/day: 0.3 packs/day for 19.7 years (4.9 ttl pk-yrs)     Types: Cigarettes     Start date:      Quit date: 2024     Years since quittin.5   • Smokeless tobacco: Never   Substance Use Topics   • Alcohol use: Never                                Counseling given: Not Answered      Vital Signs (Current):   Vitals:    25 0829   BP: (!) 132/95   Pulse: 83   Resp: 17   Temp: 98.5 °F (36.9 °C)   TempSrc: Oral   SpO2: 97%   Weight: 101.8 kg (224 lb 6.9 oz)   Height: 1.651 m (5' 5\")                                              BP Readings from Last 3 Encounters:   25 (!) 132/95   25 (!) 140/111   25 (!) 138/90       NPO Status: Time of last liquid consumption: 530 (sip of water with meds)                        Time of last solid consumption:                         Date of last liquid consumption: 25                        Date of last solid food consumption: 25    BMI:   Wt Readings from Last 3 Encounters:   25 101.8 kg (224 lb 6.9 oz)   25 101.2 kg (223 lb 1.6 oz)   25 100.2 kg (221 lb)     Body mass index is 37.35 kg/m².    CBC:   Lab Results   Component Value Date/Time    WBC 14.5 2025 01:37 PM    RBC 4.76 2025 01:37 PM    HGB 14.5 2025 01:37 PM    HCT 43.4 2025 01:37 PM    MCV 91.2 2025 01:37 PM    RDW 13.6 2025 01:37 PM     2025 01:37 PM       CMP:   Lab Results   Component Value Date/Time     2025 01:37 PM    K 4.2 2025 01:37 PM     2025 01:37 PM    CO2 23 2025 01:37 PM    BUN 11 2025 01:37 PM    CREATININE 0.66 2025 01:37 PM    GFRAA >60 2022 11:45 AM    AGRATIO 1.1 2023 04:10 PM    LABGLOM >90 2025 01:37 PM    LABGLOM >60 2024 10:00 AM

## 2025-03-14 NOTE — OP NOTE
Operative Note      Patient: Kayla Mann  YOB: 1985  MRN: [id number]    Date of Procedure: 3/14/2025    Pre-Op Diagnosis Codes:      * Lip lesion [K13.0]    Post-Op Diagnosis: Same       Procedure(s):  EXCISION OF LIP LESION    Surgeon(s):  Noris Sykes MD    Assistant:   * No surgical staff found *    Anesthesia: Monitor Anesthesia Care    Estimated Blood Loss (mL): Minimal    Complications: None    Specimens:   ID Type Source Tests Collected by Time Destination   1 : RIGHT LIP LESION. Tissue Mouth SURGICAL PATHOLOGY Noris Sykes MD 3/14/2025 1056        Implants:  * No implants in log *      Drains: * No LDAs found *    Findings:  Infection Present At Time Of Surgery (PATOS) (choose all levels that have infection present):  No infection present  6 mm papillary appearing lesion, right oral commissure   This procedure was not performed to treat primary cutaneous melanoma through wide local excision    Detailed Description of Procedure:   Kayla was met in the preoperative holding area and consent was verified. She was positioned supine on the operating room table and underwent induction of anesthesia. She was endotracheally intubated with anesthesia team without any difficulty. A preoperative timeout was performed and all those present were in agreement.  The lesion was infiltrated with local anesthetic using 1% lidocaine with 1:100,000 epinephrine. The lesion was inspected, noted to be a 6-7 mm lesion at the right oral commissure. A 15 blade was then used the excise circumferentially around the lesion and to the level of the muscle. Hemostasis was achieved using the bovie cautery. Once satisfied, the lesion was closed primarily with a 5.0 fast gut suture, with simple interrupted as well as a few horizontal mattress sutures. Bacitracin was then applied.  She was then turned back to anesthesia for emergence and extubation. She extubated without difficulty and transferred to the PACU in  stable condition.        Noris Sykes MD   [facility]  [address]  [place] [address]  Office Phone: [phone]          Electronically signed by Noris Sykes MD on 3/14/2025 at 11:15 AM

## 2025-03-21 ENCOUNTER — OFFICE VISIT (OUTPATIENT)
Age: 40
End: 2025-03-21

## 2025-03-21 VITALS
HEIGHT: 65 IN | RESPIRATION RATE: 16 BRPM | WEIGHT: 224 LBS | BODY MASS INDEX: 37.32 KG/M2 | HEART RATE: 88 BPM | DIASTOLIC BLOOD PRESSURE: 96 MMHG | SYSTOLIC BLOOD PRESSURE: 152 MMHG | OXYGEN SATURATION: 97 %

## 2025-03-21 DIAGNOSIS — K13.0 LIP LESION: Primary | ICD-10-CM

## 2025-03-21 PROCEDURE — 99024 POSTOP FOLLOW-UP VISIT: CPT | Performed by: OTOLARYNGOLOGY

## 2025-03-21 NOTE — PROGRESS NOTES
Kayla DURAN Mackenzie  1985  [id number]    S/p excision of lip lesion    No issues doing ok    BP (!) 152/96   Pulse 88   Resp 16   Ht 1.651 m (5' 5\")   Wt 101.6 kg (224 lb)   LMP 03/03/2025 (Exact Date) Comment: negative pregnancy test 03/14/2025  SpO2 97%   BMI 37.28 kg/m²     Comfortable  Excision site sutures have pulled through  Small open wound       Pathology reviewed - Right lip, biopsy:        Marked acute inflammation with associated ulceration and reactive   squamous hyperplasia.   PAS stain positive for fungal organisms consistent with Candida.   HSV I/II stain negative.   One fragment shows architectural features suggestive of squamous   papilloma.   No evidence of dysplasia or malignancy.     A/P   Routine post op course  Some wound break down but expect this should heal on its own  Suspect may be related to glucose control  Otherwise reviewed pathology  Will consider addition of diflucan but looks like this interferes with tegretol, will d/w patient       Noris Sykes MD   [facility]  [address]  [place] [address]  Office Phone: [phone]

## 2025-03-24 ENCOUNTER — TELEPHONE (OUTPATIENT)
Age: 40
End: 2025-03-24

## 2025-03-24 DIAGNOSIS — R11.2 NAUSEA AND VOMITING, UNSPECIFIED VOMITING TYPE: ICD-10-CM

## 2025-03-24 RX ORDER — ONDANSETRON 4 MG/1
TABLET, ORALLY DISINTEGRATING ORAL
Qty: 20 TABLET | Refills: 1 | OUTPATIENT
Start: 2025-03-24

## 2025-03-24 NOTE — TELEPHONE ENCOUNTER
Patient called in stating she was supposed to be prescribed Diflucan, but according to your notes, it interferes with Tegretol. Please call patient at [phone]

## 2025-03-25 ENCOUNTER — TELEPHONE (OUTPATIENT)
Facility: CLINIC | Age: 40
End: 2025-03-25

## 2025-03-25 DIAGNOSIS — M54.42 ACUTE BILATERAL LOW BACK PAIN WITH LEFT-SIDED SCIATICA: ICD-10-CM

## 2025-03-25 DIAGNOSIS — M79.7 FIBROMYALGIA: ICD-10-CM

## 2025-03-25 RX ORDER — PREGABALIN 225 MG/1
CAPSULE ORAL
Qty: 60 CAPSULE | Refills: 2 | Status: SHIPPED | OUTPATIENT
Start: 2025-03-25 | End: 2025-06-23

## 2025-03-25 NOTE — TELEPHONE ENCOUNTER
Returned call and spoke to Debbi. Advised per Dr. Burleson that patient can hold tegretol while on the diflucan.

## 2025-03-25 NOTE — TELEPHONE ENCOUNTER
Debbi called stating they would like to write pt a rx for:    -DIFLUCAN 100 MG  x 10 days   QD    But there is a interaction with   carBAMazepine (TEGRETOL-XR) 200 MG      Debbi is calling to get clearance..    Please advise...

## 2025-04-17 DIAGNOSIS — R11.2 NAUSEA AND VOMITING, UNSPECIFIED VOMITING TYPE: ICD-10-CM

## 2025-04-17 DIAGNOSIS — M79.7 FIBROMYALGIA: ICD-10-CM

## 2025-04-17 DIAGNOSIS — M54.42 ACUTE BILATERAL LOW BACK PAIN WITH LEFT-SIDED SCIATICA: ICD-10-CM

## 2025-04-17 RX ORDER — ONDANSETRON 4 MG/1
TABLET, ORALLY DISINTEGRATING ORAL
Qty: 20 TABLET | Refills: 1 | Status: SHIPPED | OUTPATIENT
Start: 2025-04-17

## 2025-04-24 ENCOUNTER — OFFICE VISIT (OUTPATIENT)
Facility: CLINIC | Age: 40
End: 2025-04-24
Payer: MEDICAID

## 2025-04-24 VITALS
HEART RATE: 96 BPM | DIASTOLIC BLOOD PRESSURE: 85 MMHG | WEIGHT: 223 LBS | RESPIRATION RATE: 16 BRPM | TEMPERATURE: 97.9 F | BODY MASS INDEX: 37.11 KG/M2 | OXYGEN SATURATION: 97 % | SYSTOLIC BLOOD PRESSURE: 119 MMHG

## 2025-04-24 DIAGNOSIS — F41.9 ANXIETY: ICD-10-CM

## 2025-04-24 DIAGNOSIS — R00.2 PALPITATIONS: Primary | ICD-10-CM

## 2025-04-24 DIAGNOSIS — E11.65 CONTROLLED TYPE 2 DIABETES MELLITUS WITH HYPERGLYCEMIA, WITHOUT LONG-TERM CURRENT USE OF INSULIN (HCC): ICD-10-CM

## 2025-04-24 PROCEDURE — 3074F SYST BP LT 130 MM HG: CPT | Performed by: FAMILY MEDICINE

## 2025-04-24 PROCEDURE — 99214 OFFICE O/P EST MOD 30 MIN: CPT | Performed by: FAMILY MEDICINE

## 2025-04-24 PROCEDURE — 3046F HEMOGLOBIN A1C LEVEL >9.0%: CPT | Performed by: FAMILY MEDICINE

## 2025-04-24 PROCEDURE — 3079F DIAST BP 80-89 MM HG: CPT | Performed by: FAMILY MEDICINE

## 2025-04-24 NOTE — PROGRESS NOTES
Chief Complaint   Patient presents with    3 Month Follow-Up     Feels the feeling of \"coming off a rollercoaster\" - felt like her heart dropped, fell and hit the ground. Has happened close to 20 times in a few weeks.         \"Have you been to the ER, urgent care clinic since your last visit?  Hospitalized since your last visit?\"    NO    “Have you seen or consulted any other health care providers outside of [facility] since your last visit?”    [facility]      “Have you had a pap smear?”    NO    Date of last Cervical Cancer screen (HPV or PAP): 9/10/2021             Click Here for Release of Records Request     Health Maintenance Due   Topic Date Due    Varicella vaccine (1 of 2 - 13+ 2-dose series) Never done    Hepatitis B vaccine (1 of 3 - 19+ 3-dose series) Never done    Pneumococcal 0-49 years Vaccine (1 of 2 - PCV) Never done    Diabetic retinal exam  09/16/2023    COVID-19 Vaccine (3 - 2024-25 season) 09/01/2024    Cervical cancer screen  09/10/2024    Diabetic foot exam  03/07/2025    Diabetic Alb to Cr ratio (uACR) test  03/08/2025

## 2025-04-24 NOTE — PROGRESS NOTES
[facility]  Chief Complaint   Patient presents with    3 Month Follow-Up     Feels the feeling of \"coming off a rollercoaster\" - felt like her heart dropped, fell and hit the ground. Has happened close to 20 times in a few weeks.        History of Present Illness:   Kayla Mann is a 40 y.o. female       HPI:  Feeling palpitations x 1 month. Feels like beat 'drops' like going on a roller coaster. Happening frequently. No LOC or syncope.   Recent surgery went well.   H/o anxiety, PTSD. Added carbamazepine in 2/25, on buspar, duloxetine as well. Previously tried seroquel.       Health Maintenance  Health Maintenance Due   Topic Date Due    Varicella vaccine (1 of 2 - 13+ 2-dose series) Never done    Hepatitis B vaccine (1 of 3 - 19+ 3-dose series) Never done    Pneumococcal 0-49 years Vaccine (1 of 2 - PCV) Never done    Diabetic retinal exam  09/16/2023    COVID-19 Vaccine (3 - 2024-25 season) 09/01/2024    Cervical cancer screen  09/10/2024    Diabetic foot exam  03/07/2025    Diabetic Alb to Cr ratio (uACR) test  03/08/2025       Past Medical, Family, and Social History:     Past Medical History:   Diagnosis Date    Abnormal cervical Papanicolaou smear 01/11/2019    Arthritis     RA    Asthma     inhaler PRN    Cyclic vomiting syndrome     Depression 02/22/2021    Epilepsy (HCC)     2014    Essential hypertension     Fatty liver     fatty liver    Fibromyalgia     Gastroparesis     Generalized convulsive epilepsy (HCC) 08/01/2022    Heart abnormality     mild heart murmur no medications required    Herpes simplex virus (HSV) infection     History of anemia     receiving IV iron last dose in 5/2020    History of kidney stones     Infertility, female     Low back pain     Obstructive sleep apnea 08/01/2022    resolved after weight loss    Polycystic disease, ovaries     PONV (postoperative nausea and vomiting)     Posttraumatic stress disorder 08/01/2022    Type 2 diabetes mellitus

## 2025-04-25 LAB
ALBUMIN SERPL-MCNC: 3.8 G/DL (ref 3.5–5)
ALBUMIN/GLOB SERPL: 1.1 (ref 1.1–2.2)
ALP SERPL-CCNC: 116 U/L (ref 45–117)
ALT SERPL-CCNC: 28 U/L (ref 12–78)
ANION GAP SERPL CALC-SCNC: 7 MMOL/L (ref 2–12)
AST SERPL-CCNC: 31 U/L (ref 15–37)
BASOPHILS # BLD: 0.07 K/UL (ref 0–0.1)
BASOPHILS NFR BLD: 0.5 % (ref 0–1)
BILIRUB SERPL-MCNC: 0.2 MG/DL (ref 0.2–1)
BUN SERPL-MCNC: 11 MG/DL (ref 6–20)
BUN/CREAT SERPL: 15 (ref 12–20)
CALCIUM SERPL-MCNC: 9.2 MG/DL (ref 8.5–10.1)
CHLORIDE SERPL-SCNC: 104 MMOL/L (ref 97–108)
CO2 SERPL-SCNC: 25 MMOL/L (ref 21–32)
CREAT SERPL-MCNC: 0.75 MG/DL (ref 0.55–1.02)
CREAT UR-MCNC: 174 MG/DL
DIFFERENTIAL METHOD BLD: ABNORMAL
EOSINOPHIL # BLD: 0.35 K/UL (ref 0–0.4)
EOSINOPHIL NFR BLD: 2.4 % (ref 0–7)
ERYTHROCYTE [DISTWIDTH] IN BLOOD BY AUTOMATED COUNT: 13.3 % (ref 11.5–14.5)
EST. AVERAGE GLUCOSE BLD GHB EST-MCNC: 275 MG/DL
GLOBULIN SER CALC-MCNC: 3.4 G/DL (ref 2–4)
GLUCOSE SERPL-MCNC: 291 MG/DL (ref 65–100)
HBA1C MFR BLD: 11.2 % (ref 4–5.6)
HCT VFR BLD AUTO: 44.8 % (ref 35–47)
HGB BLD-MCNC: 14 G/DL (ref 11.5–16)
IMM GRANULOCYTES # BLD AUTO: 0.06 K/UL (ref 0–0.04)
IMM GRANULOCYTES NFR BLD AUTO: 0.4 % (ref 0–0.5)
LYMPHOCYTES # BLD: 3.46 K/UL (ref 0.8–3.5)
LYMPHOCYTES NFR BLD: 23.5 % (ref 12–49)
MCH RBC QN AUTO: 29.5 PG (ref 26–34)
MCHC RBC AUTO-ENTMCNC: 31.3 G/DL (ref 30–36.5)
MCV RBC AUTO: 94.5 FL (ref 80–99)
MICROALBUMIN UR-MCNC: 15.7 MG/DL
MICROALBUMIN/CREAT UR-RTO: 90 MG/G (ref 0–30)
MONOCYTES # BLD: 0.77 K/UL (ref 0–1)
MONOCYTES NFR BLD: 5.2 % (ref 5–13)
NEUTS SEG # BLD: 10.02 K/UL (ref 1.8–8)
NEUTS SEG NFR BLD: 68 % (ref 32–75)
NRBC # BLD: 0 K/UL (ref 0–0.01)
NRBC BLD-RTO: 0 PER 100 WBC
PLATELET # BLD AUTO: 411 K/UL (ref 150–400)
PMV BLD AUTO: 10.3 FL (ref 8.9–12.9)
POTASSIUM SERPL-SCNC: 3.7 MMOL/L (ref 3.5–5.1)
PROT SERPL-MCNC: 7.2 G/DL (ref 6.4–8.2)
RBC # BLD AUTO: 4.74 M/UL (ref 3.8–5.2)
SODIUM SERPL-SCNC: 136 MMOL/L (ref 136–145)
T4 FREE SERPL-MCNC: 1.2 NG/DL (ref 0.8–1.5)
TSH SERPL DL<=0.05 MIU/L-ACNC: 1.05 UIU/ML (ref 0.36–3.74)
WBC # BLD AUTO: 14.7 K/UL (ref 3.6–11)

## 2025-04-26 ENCOUNTER — RESULTS FOLLOW-UP (OUTPATIENT)
Facility: CLINIC | Age: 40
End: 2025-04-26

## 2025-04-26 DIAGNOSIS — Z79.4 CONTROLLED TYPE 2 DIABETES MELLITUS WITHOUT COMPLICATION, WITH LONG-TERM CURRENT USE OF INSULIN (HCC): ICD-10-CM

## 2025-04-26 DIAGNOSIS — E11.9 CONTROLLED TYPE 2 DIABETES MELLITUS WITHOUT COMPLICATION, WITH LONG-TERM CURRENT USE OF INSULIN (HCC): ICD-10-CM

## 2025-04-26 RX ORDER — INSULIN GLARGINE 100 [IU]/ML
INJECTION, SOLUTION SUBCUTANEOUS
Qty: 5 ADJUSTABLE DOSE PRE-FILLED PEN SYRINGE | Refills: 3 | Status: SHIPPED | OUTPATIENT
Start: 2025-04-26 | End: 2025-06-05 | Stop reason: SDUPTHER

## 2025-04-29 NOTE — TELEPHONE ENCOUNTER
Pt advised of msg about increasing Lantus and calling with reading in 2 weeks. Pt also advised of rest of msg. Pt states she will have Katy Grigsby who is a [facility] Nurse help her set up her holter monitor, and she will call to update the pcp when she first puts it on.

## 2025-05-15 ENCOUNTER — TELEPHONE (OUTPATIENT)
Facility: CLINIC | Age: 40
End: 2025-05-15

## 2025-05-15 NOTE — TELEPHONE ENCOUNTER
Pt states her throat is very swollen, she can barely talk it hurts so bad. Pt says she is coughing up green mucus as well. Pt would like to be seen today if possible by Dr Burleson. Please advise.

## 2025-05-22 ENCOUNTER — OFFICE VISIT (OUTPATIENT)
Facility: CLINIC | Age: 40
End: 2025-05-22
Payer: MEDICAID

## 2025-05-22 VITALS
HEART RATE: 102 BPM | TEMPERATURE: 98.1 F | SYSTOLIC BLOOD PRESSURE: 123 MMHG | BODY MASS INDEX: 35.99 KG/M2 | HEIGHT: 65 IN | DIASTOLIC BLOOD PRESSURE: 87 MMHG | OXYGEN SATURATION: 95 % | RESPIRATION RATE: 18 BRPM | WEIGHT: 216 LBS

## 2025-05-22 DIAGNOSIS — H65.192 ACUTE EFFUSION OF LEFT EAR: ICD-10-CM

## 2025-05-22 DIAGNOSIS — H93.8X3 CONGESTION OF BOTH EARS: Primary | ICD-10-CM

## 2025-05-22 PROCEDURE — 3079F DIAST BP 80-89 MM HG: CPT

## 2025-05-22 PROCEDURE — 99213 OFFICE O/P EST LOW 20 MIN: CPT

## 2025-05-22 PROCEDURE — 3074F SYST BP LT 130 MM HG: CPT

## 2025-05-22 RX ORDER — FLUTICASONE PROPIONATE 50 MCG
1 SPRAY, SUSPENSION (ML) NASAL DAILY
Qty: 16 G | Refills: 2 | Status: SHIPPED | OUTPATIENT
Start: 2025-05-22

## 2025-05-22 NOTE — PROGRESS NOTES
Chief Complaint   Patient presents with    ED Follow-up     [facility] 5/15/25. Was given clindamycin for sore throat. Reports no improvement in sx.         \"Have you been to the ER, urgent care clinic since your last visit?  Hospitalized since your last visit?\"    YES- [facility] for sore throat     “Have you seen or consulted any other health care providers outside of [facility] since your last visit?”    NO     “Have you had a pap smear?”    NO    Date of last Cervical Cancer screen (HPV or PAP): 9/10/2021             Click Here for Release of Records Request     Health Maintenance Due   Topic Date Due    Varicella vaccine (1 of 2 - 13+ 2-dose series) Never done    Hepatitis B vaccine (1 of 3 - 19+ 3-dose series) Never done    Pneumococcal 0-49 years Vaccine (1 of 2 - PCV) Never done    Diabetic retinal exam  09/16/2023    COVID-19 Vaccine (3 - 2024-25 season) 09/01/2024    Cervical cancer screen  09/10/2024    Diabetic foot exam  03/07/2025

## 2025-05-22 NOTE — PROGRESS NOTES
I reviewed with the resident the medical history and the resident's findings on the physical examination.  I discussed with the resident the patient's diagnosis and concur with the plan.     Fariha Li MD 5/22/2025

## 2025-05-22 NOTE — PROGRESS NOTES
History of Present Illness:    Kayla Mann is a 40 y.o. female who presents for ear discomfort     She was seen at [facility] on 5/15 for sore throat. Rapid strep was negative but she was prescribed Clindamycin for presumed strep as her son was diagnosed with it.   Reports minimal improvement with abx   Throat still hurts and left ear is painful       Physical Examination:     /87 (BP Site: Left Upper Arm, Patient Position: Sitting, BP Cuff Size: Large Adult)   Pulse (!) 102   Temp 98.1 °F (36.7 °C) (Oral)   Resp 18   Ht 1.651 m (5' 5\")   Wt 98 kg (216 lb)   SpO2 95%   BMI 35.94 kg/m²     GEN: No apparent distress. Alert and oriented and responds to all questions appropriately.  EYES:  Conjunctiva clear; extraocular movements are intact  EAR: External ears are normal.  Left ear effusion, right tympanic membrane normal  OROPHYARYNX: No oral lesions or exudates.        LUNGS: Respirations unlabored; clear to auscultation bilaterally  CARDIOVASCULAR: Normal rate, regular rhythm   NEUROLOGIC:  No focal neurologic deficits. Coordination and gait grossly intact.   EXT: Well perfused. No edema.  SKIN: No obvious rashes.        Past Medical History:   Diagnosis Date    Abnormal cervical Papanicolaou smear 01/11/2019    Arthritis     RA    Asthma     inhaler PRN    Cyclic vomiting syndrome     Depression 02/22/2021    Epilepsy (HCC)     2014    Essential hypertension     Fatty liver     fatty liver    Fibromyalgia     Gastroparesis     Generalized convulsive epilepsy (HCC) 08/01/2022    Heart abnormality     mild heart murmur no medications required    Herpes simplex virus (HSV) infection     History of anemia     receiving IV iron last dose in 5/2020    History of kidney stones     Infertility, female     Low back pain     Obstructive sleep apnea 08/01/2022    resolved after weight loss    Polycystic disease, ovaries     PONV (postoperative nausea and vomiting)     Posttraumatic stress

## 2025-06-02 DIAGNOSIS — R11.2 NAUSEA AND VOMITING, UNSPECIFIED VOMITING TYPE: ICD-10-CM

## 2025-06-02 RX ORDER — ALBUTEROL SULFATE 90 UG/1
2 INHALANT RESPIRATORY (INHALATION) EVERY 6 HOURS PRN
Qty: 18 G | Refills: 5 | Status: SHIPPED | OUTPATIENT
Start: 2025-06-02

## 2025-06-02 RX ORDER — ONDANSETRON 4 MG/1
TABLET, ORALLY DISINTEGRATING ORAL
Qty: 20 TABLET | Refills: 1 | Status: SHIPPED | OUTPATIENT
Start: 2025-06-02

## 2025-06-02 NOTE — TELEPHONE ENCOUNTER
ondansetron (ZOFRAN-ODT) 4 MG disintegrating tablet   albuterol sulfate HFA (PROVENTIL;VENTOLIN;PROAIR) 108 (90 Base) MCG/ACT inhaler   Pt will like meds refilled, please send to [facility].Please advise.

## 2025-06-05 ENCOUNTER — OFFICE VISIT (OUTPATIENT)
Facility: CLINIC | Age: 40
End: 2025-06-05
Payer: MEDICAID

## 2025-06-05 VITALS
OXYGEN SATURATION: 97 % | HEART RATE: 89 BPM | DIASTOLIC BLOOD PRESSURE: 81 MMHG | HEIGHT: 65 IN | RESPIRATION RATE: 16 BRPM | BODY MASS INDEX: 37.15 KG/M2 | WEIGHT: 223 LBS | SYSTOLIC BLOOD PRESSURE: 125 MMHG | TEMPERATURE: 97.6 F

## 2025-06-05 DIAGNOSIS — Z79.4 DIABETES MELLITUS DUE TO UNDERLYING CONDITION WITH HYPERGLYCEMIA, WITH LONG-TERM CURRENT USE OF INSULIN (HCC): ICD-10-CM

## 2025-06-05 DIAGNOSIS — E08.65 DIABETES MELLITUS DUE TO UNDERLYING CONDITION WITH HYPERGLYCEMIA, WITH LONG-TERM CURRENT USE OF INSULIN (HCC): ICD-10-CM

## 2025-06-05 LAB — GLUCOSE, POC: 444 MG/DL

## 2025-06-05 PROCEDURE — 3079F DIAST BP 80-89 MM HG: CPT | Performed by: FAMILY MEDICINE

## 2025-06-05 PROCEDURE — 82962 GLUCOSE BLOOD TEST: CPT | Performed by: FAMILY MEDICINE

## 2025-06-05 PROCEDURE — 99214 OFFICE O/P EST MOD 30 MIN: CPT | Performed by: FAMILY MEDICINE

## 2025-06-05 PROCEDURE — 3074F SYST BP LT 130 MM HG: CPT | Performed by: FAMILY MEDICINE

## 2025-06-05 RX ORDER — INSULIN GLARGINE 100 [IU]/ML
INJECTION, SOLUTION SUBCUTANEOUS
Qty: 5 ADJUSTABLE DOSE PRE-FILLED PEN SYRINGE | Refills: 3 | Status: SHIPPED | OUTPATIENT
Start: 2025-06-05

## 2025-06-05 RX ORDER — INSULIN LISPRO 100 [IU]/ML
INJECTION, SOLUTION INTRAVENOUS; SUBCUTANEOUS
Qty: 3 ML | Refills: 5 | Status: SHIPPED | OUTPATIENT
Start: 2025-06-05

## 2025-06-05 RX ORDER — FLASH GLUCOSE SENSOR
KIT MISCELLANEOUS
Qty: 2 EACH | Refills: 5 | Status: SHIPPED | OUTPATIENT
Start: 2025-06-05

## 2025-06-05 RX ORDER — FLASH GLUCOSE SENSOR
KIT MISCELLANEOUS
Qty: 1 EACH | Refills: 0 | Status: SHIPPED | OUTPATIENT
Start: 2025-06-05

## 2025-06-05 RX ORDER — FLURBIPROFEN SODIUM 0.3 MG/ML
SOLUTION/ DROPS OPHTHALMIC
COMMUNITY
Start: 2025-03-05

## 2025-06-05 ASSESSMENT — ENCOUNTER SYMPTOMS: COUGH: 0

## 2025-06-05 NOTE — PROGRESS NOTES
[facility]  Chief Complaint   Patient presents with    Follow-up Chronic Condition       History of Present Illness:   Kayla Mann is a 40 y.o. female       HPI:  Here for f/u DM, palpitations x 1 month. Feels like beat 'drops' like going on a roller coaster. Happening frequently. No LOC or syncope.   Holter normal  Recent surgery went well.   H/o anxiety, PTSD. Family stressors.   Added carbamazepine in 2/25, on buspar, duloxetine as well. Previously tried seroquel.    Hemoglobin A1C   Date Value Ref Range Status   04/24/2025 11.2 (H) 4.0 - 5.6 % Final     Comment:     (NOTE)  HbA1C Interpretive Ranges  <5.7              Normal  5.7 - 6.4         Consider Prediabetes  >6.5              Consider Diabetes       Hemoglobin A1C, POC   Date Value Ref Range Status   10/21/2024 8.8 % Final     I increased lantus 2 units every 2-3 days until at 30 units.  Ate 2 brownies last night    Health Maintenance  Health Maintenance Due   Topic Date Due    Varicella vaccine (1 of 2 - 13+ 2-dose series) Never done    Hepatitis B vaccine (1 of 3 - 19+ 3-dose series) Never done    Pneumococcal 0-49 years Vaccine (1 of 2 - PCV) Never done    Diabetic retinal exam  09/16/2023    COVID-19 Vaccine (3 - 2024-25 season) 09/01/2024    Cervical cancer screen  09/10/2024       Past Medical, Family, and Social History:     Past Medical History:   Diagnosis Date    Abnormal cervical Papanicolaou smear 01/11/2019    Arthritis     RA    Asthma     inhaler PRN    Cyclic vomiting syndrome     Depression 02/22/2021    Epilepsy (HCC)     2014    Essential hypertension     Fatty liver     fatty liver    Fibromyalgia     Gastroparesis     Generalized convulsive epilepsy (HCC) 08/01/2022    Heart abnormality     mild heart murmur no medications required    Herpes simplex virus (HSV) infection     History of anemia     receiving IV iron last dose in 5/2020    History of kidney stones     Infertility, female     Low back pain

## 2025-06-05 NOTE — PROGRESS NOTES
Chief Complaint   Patient presents with    Follow-up Chronic Condition        \"Have you been to the ER, urgent care clinic since your last visit?  Hospitalized since your last visit?\"    [facility] 5/2025     “Have you seen or consulted any other health care providers outside of [facility] since your last visit?”    NO     “Have you had a pap smear?”    NO    Date of last Cervical Cancer screen (HPV or PAP): 9/10/2021             Click Here for Release of Records Request     Health Maintenance Due   Topic Date Due    Varicella vaccine (1 of 2 - 13+ 2-dose series) Never done    Hepatitis B vaccine (1 of 3 - 19+ 3-dose series) Never done    Pneumococcal 0-49 years Vaccine (1 of 2 - PCV) Never done    Diabetic retinal exam  09/16/2023    COVID-19 Vaccine (3 - 2024-25 season) 09/01/2024    Cervical cancer screen  09/10/2024    Diabetic foot exam  03/07/2025

## 2025-06-09 ENCOUNTER — OFFICE VISIT (OUTPATIENT)
Facility: CLINIC | Age: 40
End: 2025-06-09
Payer: MEDICAID

## 2025-06-09 VITALS
WEIGHT: 224.4 LBS | BODY MASS INDEX: 37.39 KG/M2 | DIASTOLIC BLOOD PRESSURE: 80 MMHG | HEART RATE: 129 BPM | HEIGHT: 65 IN | OXYGEN SATURATION: 96 % | TEMPERATURE: 98.5 F | SYSTOLIC BLOOD PRESSURE: 113 MMHG | RESPIRATION RATE: 18 BRPM

## 2025-06-09 DIAGNOSIS — R00.0 TACHYCARDIA: Primary | ICD-10-CM

## 2025-06-09 DIAGNOSIS — J02.9 SORE THROAT: ICD-10-CM

## 2025-06-09 LAB
GROUP A STREP ANTIGEN, POC: NEGATIVE
VALID INTERNAL CONTROL, POC: YES

## 2025-06-09 PROCEDURE — 87880 STREP A ASSAY W/OPTIC: CPT

## 2025-06-09 PROCEDURE — 3074F SYST BP LT 130 MM HG: CPT

## 2025-06-09 PROCEDURE — 99214 OFFICE O/P EST MOD 30 MIN: CPT

## 2025-06-09 PROCEDURE — 93000 ELECTROCARDIOGRAM COMPLETE: CPT

## 2025-06-09 PROCEDURE — 3079F DIAST BP 80-89 MM HG: CPT

## 2025-06-09 NOTE — PROGRESS NOTES
Have you been to the ER, urgent care clinic since your last visit?  Hospitalized since your last visit?   NO    Have you seen or consulted any other health care providers outside our system since your last visit?   NO     “Have you had a pap smear?”    NO    Date of last Cervical Cancer screen (HPV or PAP): 9/10/2021       “Have you had a diabetic eye exam?”    NO     Date of last diabetic eye exam: 9/16/2022

## 2025-06-09 NOTE — PROGRESS NOTES
[facility]   [facility]    Subjective:  CC:   Chief Complaint   Patient presents with    Thrush     Thrush in mouth       HPI:  Kayla Mann is 40 y.o. female who presents today for mouth and throat pain x 2-3 days.     No oral lesions.   No chest pain, dyspnea, palpitations.   No nausea, vomiting, diarrhea, fevers.   No bleeding.   No leg swelling.     Objective:    Vitals:    06/09/25 1537 06/09/25 1605   BP: 113/80    BP Site: Left Upper Arm    Patient Position: Sitting    BP Cuff Size: Large Adult    Pulse: (!) 129 (!) 129   Resp: 18    Temp: 98.5 °F (36.9 °C)    TempSrc: Oral    SpO2: 96%    Weight: 101.8 kg (224 lb 6.4 oz)    Height: 1.651 m (5' 5\")        Physical Exam  Vitals and nursing note reviewed.   Constitutional:       General: She is not in acute distress.     Appearance: She is not ill-appearing.   HENT:      Mouth/Throat:      Lips: No lesions.      Mouth: Mucous membranes are moist. No oral lesions.      Pharynx: Oropharynx is clear. Uvula midline. Posterior oropharyngeal erythema present. No oropharyngeal exudate or uvula swelling.      Tonsils: No tonsillar exudate or tonsillar abscesses.   Eyes:      Extraocular Movements: Extraocular movements intact.      Conjunctiva/sclera: Conjunctivae normal.   Cardiovascular:      Rate and Rhythm: Regular rhythm. Tachycardia present.      Heart sounds: Normal heart sounds.   Pulmonary:      Effort: Pulmonary effort is normal.      Breath sounds: Normal breath sounds.   Musculoskeletal:      Right lower leg: No edema.      Left lower leg: No edema.   Skin:     General: Skin is warm and dry.   Neurological:      Mental Status: She is alert.   Psychiatric:         Mood and Affect: Mood normal.         Behavior: Behavior normal.         Recent Results (from the past 12 hours)   AMB POC RAPID STREP A    Collection Time: 06/09/25  4:07 PM   Result Value Ref Range    Valid Internal Control, POC Yes     Group A

## 2025-06-16 DIAGNOSIS — F41.9 ANXIETY: ICD-10-CM

## 2025-06-16 RX ORDER — BUSPIRONE HYDROCHLORIDE 10 MG/1
10 TABLET ORAL 3 TIMES DAILY
Qty: 90 TABLET | Refills: 2 | Status: SHIPPED | OUTPATIENT
Start: 2025-06-16

## 2025-06-23 DIAGNOSIS — R11.2 NAUSEA AND VOMITING, UNSPECIFIED VOMITING TYPE: ICD-10-CM

## 2025-06-23 RX ORDER — ONDANSETRON 4 MG/1
TABLET, ORALLY DISINTEGRATING ORAL
Qty: 20 TABLET | Refills: 1 | Status: SHIPPED | OUTPATIENT
Start: 2025-06-23

## 2025-06-29 DIAGNOSIS — K21.9 GASTROESOPHAGEAL REFLUX DISEASE WITHOUT ESOPHAGITIS: ICD-10-CM

## 2025-06-30 RX ORDER — OMEPRAZOLE 20 MG/1
CAPSULE, DELAYED RELEASE ORAL
Qty: 180 CAPSULE | Refills: 1 | Status: SHIPPED | OUTPATIENT
Start: 2025-06-30

## 2025-07-08 DIAGNOSIS — M79.7 FIBROMYALGIA: ICD-10-CM

## 2025-07-08 DIAGNOSIS — M54.42 ACUTE BILATERAL LOW BACK PAIN WITH LEFT-SIDED SCIATICA: ICD-10-CM

## 2025-07-08 RX ORDER — PREGABALIN 225 MG/1
CAPSULE ORAL
Qty: 60 CAPSULE | Refills: 2 | Status: SHIPPED | OUTPATIENT
Start: 2025-07-08 | End: 2025-10-06

## 2025-07-10 ENCOUNTER — OFFICE VISIT (OUTPATIENT)
Facility: CLINIC | Age: 40
End: 2025-07-10
Payer: MEDICAID

## 2025-07-10 VITALS
WEIGHT: 221 LBS | OXYGEN SATURATION: 96 % | BODY MASS INDEX: 36.82 KG/M2 | SYSTOLIC BLOOD PRESSURE: 128 MMHG | DIASTOLIC BLOOD PRESSURE: 89 MMHG | HEIGHT: 65 IN | RESPIRATION RATE: 18 BRPM | TEMPERATURE: 97.9 F | HEART RATE: 89 BPM

## 2025-07-10 DIAGNOSIS — N30.01 ACUTE CYSTITIS WITH HEMATURIA: Primary | ICD-10-CM

## 2025-07-10 DIAGNOSIS — I10 BENIGN HYPERTENSION: ICD-10-CM

## 2025-07-10 DIAGNOSIS — F41.9 ANXIETY: ICD-10-CM

## 2025-07-10 DIAGNOSIS — R10.30 LOWER ABDOMINAL PAIN: ICD-10-CM

## 2025-07-10 DIAGNOSIS — R00.0 TACHYCARDIA: ICD-10-CM

## 2025-07-10 DIAGNOSIS — E11.65 CONTROLLED TYPE 2 DIABETES MELLITUS WITH HYPERGLYCEMIA, WITHOUT LONG-TERM CURRENT USE OF INSULIN (HCC): ICD-10-CM

## 2025-07-10 LAB
BILIRUBIN, URINE, POC: NEGATIVE
BLOOD URINE, POC: NORMAL
GLUCOSE URINE, POC: NORMAL
HCG, PREGNANCY, URINE, POC: NEGATIVE
KETONES, URINE, POC: NORMAL
LEUKOCYTE ESTERASE, URINE, POC: NEGATIVE
NITRITE, URINE, POC: NEGATIVE
PH, URINE, POC: 6 (ref 4.6–8)
PROTEIN,URINE, POC: NORMAL
SPECIFIC GRAVITY, URINE, POC: 1.02 (ref 1–1.03)
URINALYSIS CLARITY, POC: CLEAR
URINALYSIS COLOR, POC: NORMAL
UROBILINOGEN, POC: NORMAL
VALID INTERNAL CONTROL, POC: YES

## 2025-07-10 PROCEDURE — 3046F HEMOGLOBIN A1C LEVEL >9.0%: CPT | Performed by: FAMILY MEDICINE

## 2025-07-10 PROCEDURE — 3074F SYST BP LT 130 MM HG: CPT | Performed by: FAMILY MEDICINE

## 2025-07-10 PROCEDURE — 81025 URINE PREGNANCY TEST: CPT | Performed by: FAMILY MEDICINE

## 2025-07-10 PROCEDURE — 81003 URINALYSIS AUTO W/O SCOPE: CPT | Performed by: FAMILY MEDICINE

## 2025-07-10 PROCEDURE — 3079F DIAST BP 80-89 MM HG: CPT | Performed by: FAMILY MEDICINE

## 2025-07-10 PROCEDURE — 99214 OFFICE O/P EST MOD 30 MIN: CPT | Performed by: FAMILY MEDICINE

## 2025-07-10 RX ORDER — BUSPIRONE HYDROCHLORIDE 15 MG/1
15 TABLET ORAL 3 TIMES DAILY
Qty: 90 TABLET | Refills: 1 | Status: SHIPPED | OUTPATIENT
Start: 2025-07-10

## 2025-07-10 RX ORDER — CIPROFLOXACIN 500 MG/1
500 TABLET, FILM COATED ORAL 2 TIMES DAILY
Qty: 10 TABLET | Refills: 0 | Status: SHIPPED | OUTPATIENT
Start: 2025-07-10 | End: 2025-07-15

## 2025-07-10 RX ORDER — ONDANSETRON 4 MG/1
4 TABLET, ORALLY DISINTEGRATING ORAL ONCE
Status: COMPLETED | OUTPATIENT
Start: 2025-07-10 | End: 2025-07-10

## 2025-07-10 RX ORDER — METOPROLOL SUCCINATE 25 MG/1
25 TABLET, EXTENDED RELEASE ORAL DAILY
Qty: 90 TABLET | Refills: 1 | Status: SHIPPED | OUTPATIENT
Start: 2025-07-10

## 2025-07-10 RX ADMIN — ONDANSETRON 4 MG: 4 TABLET, ORALLY DISINTEGRATING ORAL at 15:42

## 2025-07-10 ASSESSMENT — ENCOUNTER SYMPTOMS: COUGH: 0

## 2025-07-10 NOTE — PATIENT INSTRUCTIONS
Today take 20 units Lantus, hold humalog if not eating.  Once you start eating start back on 40 units Lantus and humalog.

## 2025-07-10 NOTE — ASSESSMENT & PLAN NOTE
Chronic, not at goal (unstable), changes made today: increase buspar    Orders:    busPIRone (BUSPAR) 15 MG tablet; Take 15 mg by mouth 3 times daily

## 2025-07-10 NOTE — ASSESSMENT & PLAN NOTE
Chronic, at goal (stable), start BB for tachycardia    Orders:    metoprolol succinate (TOPROL XL) 25 MG extended release tablet; Take 1 tablet by mouth daily

## 2025-07-10 NOTE — ASSESSMENT & PLAN NOTE
Chronic, not at goal (unstable), discussed sick day plan  Today take 20 units Lantus (half dose), hold humalog if not eating.  Once you start eating start back on 40 units Lantus and humalog.

## 2025-07-10 NOTE — PROGRESS NOTES
[facility]  Chief Complaint   Patient presents with    Urinary Frequency     W/ lower abd pain  Vomiting / nausea        History of Present Illness:   Kayla Mann is a 40 y.o. female       HPI:  Here for f/u DM, tachycardia. Increased lantus to 40 units, added short acting with meals and snack  C/o lower abd pain, vomiting, started at 4 pm yesterday.   C/o Frequency, dysura, no hematuria.  No change in bowel habits  Has IUD    CGM shows 303 now. Has not taken any insulin yet today as she has not eaten. Has been drinking water.    palpitations happening frequently. No LOC or syncope.   Holter normal    Went to ED on 6/11/25.  EKG reveals sinus tachycardia, RBBB, possible lateral  infarct age undetermined. Chest x-ray reveals no acute cardiopulmonary  abnormality. CBC reveals mild leukocytosis 11.65, hemoglobin and hematocrit are  stable. Normal coag studies. CMP reveals no significant electrolyte  abnormalities,normal kidney function, elevated glucose 319, no anion gap, normal  liver enzymes. Normal magnesium. Normal lipase. Normal TSH. Negative  troponin. UA reveals no evidence of UTI, trace ketones noted in urine. POC  glucose did improve after giving IV fluids to 226. The patient told to follow  up with Cardiology for further evaluation if symptoms persist or worsen.     H/o anxiety, PTSD. Family stressors.   Added carbamazepine in 2/25, on buspar, duloxetine as well. Previously tried seroquel.    Hemoglobin A1C   Date Value Ref Range Status   04/24/2025 11.2 (H) 4.0 - 5.6 % Final     Comment:     (NOTE)  HbA1C Interpretive Ranges  <5.7              Normal  5.7 - 6.4         Consider Prediabetes  >6.5              Consider Diabetes       Hemoglobin A1C, POC   Date Value Ref Range Status   10/21/2024 8.8 % Final         Health Maintenance  Health Maintenance Due   Topic Date Due    Varicella vaccine (1 of 2 - 13+ 2-dose series) Never done    Hepatitis B vaccine (1 of 3 - 19+ 3-dose

## 2025-07-10 NOTE — PROGRESS NOTES
\"Have you been to the ER, urgent care clinic since your last visit?  Hospitalized since your last visit?\"    NO    “Have you seen or consulted any other health care providers outside of [facility] since your last visit?”    NO     “Have you had a pap smear?”    NO    Date of last Cervical Cancer screen (HPV or PAP): 9/10/2021             Click Here for Release of Records Request     Health Maintenance Due   Topic Date Due    Varicella vaccine (1 of 2 - 13+ 2-dose series) Never done    Hepatitis B vaccine (1 of 3 - 19+ 3-dose series) Never done    Pneumococcal 0-49 years Vaccine (1 of 2 - PCV) Never done    Diabetic retinal exam  09/16/2023    COVID-19 Vaccine (3 - 2024-25 season) 09/01/2024    Cervical cancer screen  09/10/2024    A1C test (Diabetic or Prediabetic)  07/24/2025

## 2025-07-12 LAB
BACTERIA SPEC CULT: NORMAL
CC UR VC: NORMAL
SERVICE CMNT-IMP: NORMAL

## 2025-07-23 RX ORDER — CARBAMAZEPINE 200 MG/1
200 TABLET, EXTENDED RELEASE ORAL 2 TIMES DAILY
Qty: 60 TABLET | Refills: 3 | Status: SHIPPED | OUTPATIENT
Start: 2025-07-23

## 2025-07-25 DIAGNOSIS — I10 ESSENTIAL (PRIMARY) HYPERTENSION: ICD-10-CM

## 2025-07-25 RX ORDER — AMLODIPINE BESYLATE 10 MG/1
10 TABLET ORAL EVERY MORNING
Qty: 90 TABLET | Refills: 1 | Status: SHIPPED | OUTPATIENT
Start: 2025-07-25

## 2025-07-28 DIAGNOSIS — R11.2 NAUSEA AND VOMITING, UNSPECIFIED VOMITING TYPE: ICD-10-CM

## 2025-07-28 RX ORDER — ONDANSETRON 4 MG/1
TABLET, ORALLY DISINTEGRATING ORAL
Qty: 20 TABLET | Refills: 1 | Status: SHIPPED | OUTPATIENT
Start: 2025-07-28

## 2025-08-07 ENCOUNTER — OFFICE VISIT (OUTPATIENT)
Facility: CLINIC | Age: 40
End: 2025-08-07
Payer: MEDICAID

## 2025-08-07 VITALS
RESPIRATION RATE: 18 BRPM | BODY MASS INDEX: 36.65 KG/M2 | HEART RATE: 100 BPM | TEMPERATURE: 98.7 F | OXYGEN SATURATION: 98 % | WEIGHT: 220 LBS | DIASTOLIC BLOOD PRESSURE: 88 MMHG | HEIGHT: 65 IN | SYSTOLIC BLOOD PRESSURE: 123 MMHG

## 2025-08-07 DIAGNOSIS — Z79.4 TYPE 2 DIABETES MELLITUS WITH HYPERGLYCEMIA, WITH LONG-TERM CURRENT USE OF INSULIN (HCC): Primary | ICD-10-CM

## 2025-08-07 DIAGNOSIS — I10 ESSENTIAL (PRIMARY) HYPERTENSION: ICD-10-CM

## 2025-08-07 DIAGNOSIS — E11.65 TYPE 2 DIABETES MELLITUS WITH HYPERGLYCEMIA, WITH LONG-TERM CURRENT USE OF INSULIN (HCC): Primary | ICD-10-CM

## 2025-08-07 DIAGNOSIS — M79.7 FIBROMYALGIA: ICD-10-CM

## 2025-08-07 DIAGNOSIS — M54.42 ACUTE BILATERAL LOW BACK PAIN WITH LEFT-SIDED SCIATICA: ICD-10-CM

## 2025-08-07 LAB — HBA1C MFR BLD: 11.5 %

## 2025-08-07 PROCEDURE — 83036 HEMOGLOBIN GLYCOSYLATED A1C: CPT | Performed by: FAMILY MEDICINE

## 2025-08-07 PROCEDURE — 99214 OFFICE O/P EST MOD 30 MIN: CPT | Performed by: FAMILY MEDICINE

## 2025-08-07 PROCEDURE — 3046F HEMOGLOBIN A1C LEVEL >9.0%: CPT | Performed by: FAMILY MEDICINE

## 2025-08-07 PROCEDURE — 3074F SYST BP LT 130 MM HG: CPT | Performed by: FAMILY MEDICINE

## 2025-08-07 PROCEDURE — 3079F DIAST BP 80-89 MM HG: CPT | Performed by: FAMILY MEDICINE

## 2025-08-07 RX ORDER — INSULIN LISPRO 100 [IU]/ML
INJECTION, SOLUTION INTRAVENOUS; SUBCUTANEOUS
Qty: 3 ML | Refills: 5 | Status: SHIPPED | OUTPATIENT
Start: 2025-08-07

## 2025-08-07 RX ORDER — METFORMIN HYDROCHLORIDE 500 MG/1
1000 TABLET, EXTENDED RELEASE ORAL 2 TIMES DAILY
Qty: 360 TABLET | Refills: 1 | Status: SHIPPED | OUTPATIENT
Start: 2025-08-07

## 2025-08-07 RX ORDER — PREGABALIN 225 MG/1
225 CAPSULE ORAL 2 TIMES DAILY
Qty: 60 CAPSULE | Refills: 2 | Status: SHIPPED | OUTPATIENT
Start: 2025-08-07 | End: 2025-11-05

## 2025-08-07 RX ORDER — LOSARTAN POTASSIUM 100 MG/1
100 TABLET ORAL DAILY
Qty: 90 TABLET | Refills: 1 | Status: SHIPPED | OUTPATIENT
Start: 2025-08-07

## 2025-08-07 RX ORDER — INSULIN GLARGINE 100 [IU]/ML
INJECTION, SOLUTION SUBCUTANEOUS
Qty: 5 ADJUSTABLE DOSE PRE-FILLED PEN SYRINGE | Refills: 3 | Status: SHIPPED | OUTPATIENT
Start: 2025-08-07

## 2025-08-07 ASSESSMENT — ENCOUNTER SYMPTOMS
SHORTNESS OF BREATH: 0
COUGH: 0

## (undated) DEVICE — STERILE POLYISOPRENE POWDER-FREE SURGICAL GLOVES WITH EMOLLIENT COATING: Brand: PROTEXIS

## (undated) DEVICE — C-SECTION II-LF: Brand: MEDLINE INDUSTRIES, INC.

## (undated) DEVICE — STERILE POLYISOPRENE POWDER-FREE SURGICAL GLOVES: Brand: PROTEXIS

## (undated) DEVICE — SOLUTION IV 1000ML 0.9% SOD CHL

## (undated) DEVICE — MINOR ENT-SFMCASU: Brand: MEDLINE INDUSTRIES, INC.

## (undated) DEVICE — SUTURE VCRL SZ 0 L36IN ABSRB VLT L36MM CT-1 1/2 CIR J346H

## (undated) DEVICE — TUBING SUCT 10FR MAL ALUM SHFT FN CAP VENT UNIV CONN W/ OBT

## (undated) DEVICE — ROCKER SWITCH PENCIL HOLSTER: Brand: VALLEYLAB

## (undated) DEVICE — HANDLE LT SNAP ON ULT DURABLE LENS FOR TRUMPF ALC DISPOSABLE

## (undated) DEVICE — STRIP,CLOSURE,WOUND,MEDI-STRIP,1/2X4: Brand: MEDLINE

## (undated) DEVICE — PENCIL ES CRD L10FT HND SWCHING ROCK SWCH W/ EDGE COAT BLDE

## (undated) DEVICE — SUTURE VCRL SZ 2-0 L27IN ABSRB VLT L40MM CT 1/2 CIR J351H

## (undated) DEVICE — SUTURE MCRYL SZ 0 L36IN ABSRB UD L36MM CT-1 1/2 CIR Y946H

## (undated) DEVICE — GARMENT,MEDLINE,DVT,INT,CALF,MED, GEN2: Brand: MEDLINE

## (undated) DEVICE — SUTURE PLN GUT SZ 5-0 L18IN ABSRB YELLOWISH TAN L13MM PC-1 1915G

## (undated) DEVICE — TRAY,URINE METER,100% SILICONE,16FR10ML: Brand: MEDLINE

## (undated) DEVICE — 3000CC GUARDIAN II: Brand: GUARDIAN

## (undated) DEVICE — TRAY PREP DRY W/ PREM GLV 2 APPL 6 SPNG 2 UNDPD 1 OVERWRAP

## (undated) DEVICE — SUTURE MCRYL SZ 4-0 L27IN ABSRB UD L19MM PS-2 1/2 CIR PRIM Y426H

## (undated) DEVICE — TOWEL,OR,DSP,ST,BLUE,STD,2/PK,40PK/CS: Brand: MEDLINE

## (undated) DEVICE — PICO 7 10CM X 30CM: Brand: PICO™ 7

## (undated) DEVICE — POOLE SUCTION INSTRUMENT WITH REMOVABLE SHEATH: Brand: POOLE

## (undated) DEVICE — SOLUTION IRRIG 1000ML 09% SOD CHL USP PIC PLAS CONTAINER

## (undated) DEVICE — REM POLYHESIVE ADULT PATIENT RETURN ELECTRODE: Brand: VALLEYLAB

## (undated) DEVICE — BLADE ES ELASTOMERIC COAT INSUL DURABLE BEND UPTO 90DEG

## (undated) DEVICE — SOLIDIFIER FLUID 3000 CC ABSORB

## (undated) DEVICE — MASTISOL ADHESIVE LIQ 2/3ML

## (undated) DEVICE — SOLUTION IRRIG 1000ML H2O PIC PLAS SHATTERPROOF CONTAINER

## (undated) DEVICE — (D)PREP SKN CHLRAPRP APPL 26ML -- CONVERT TO ITEM 371833

## (undated) DEVICE — SUTURE PLN GUT SZ 2-0 L27IN ABSRB YELLOWISH TAN L70MM XLH 53T

## (undated) DEVICE — TOWEL,OR,DSP,ST,BLUE,STD,4/PK,20PK/CS: Brand: MEDLINE